# Patient Record
Sex: FEMALE | Race: WHITE | NOT HISPANIC OR LATINO | URBAN - METROPOLITAN AREA
[De-identification: names, ages, dates, MRNs, and addresses within clinical notes are randomized per-mention and may not be internally consistent; named-entity substitution may affect disease eponyms.]

---

## 2018-02-21 ENCOUNTER — INPATIENT (INPATIENT)
Facility: HOSPITAL | Age: 83
LOS: 4 days | Discharge: NURSING, REHAB OR LONG-TERM CARE | End: 2018-02-26
Attending: FAMILY MEDICINE | Admitting: FAMILY MEDICINE
Payer: MEDICARE

## 2018-02-21 VITALS — WEIGHT: 115.08 LBS

## 2018-02-21 DIAGNOSIS — F32.5 MAJOR DEPRESSIVE DISORDER, SINGLE EPISODE, IN FULL REMISSION: ICD-10-CM

## 2018-02-21 DIAGNOSIS — E86.0 DEHYDRATION: ICD-10-CM

## 2018-02-21 DIAGNOSIS — J96.91 RESPIRATORY FAILURE, UNSPECIFIED WITH HYPOXIA: ICD-10-CM

## 2018-02-21 DIAGNOSIS — E03.9 HYPOTHYROIDISM, UNSPECIFIED: ICD-10-CM

## 2018-02-21 DIAGNOSIS — G93.40 ENCEPHALOPATHY, UNSPECIFIED: ICD-10-CM

## 2018-02-21 DIAGNOSIS — R00.9 UNSPECIFIED ABNORMALITIES OF HEART BEAT: ICD-10-CM

## 2018-02-21 DIAGNOSIS — M25.551 PAIN IN RIGHT HIP: ICD-10-CM

## 2018-02-21 DIAGNOSIS — R09.02 HYPOXEMIA: ICD-10-CM

## 2018-02-21 DIAGNOSIS — N30.01 ACUTE CYSTITIS WITH HEMATURIA: ICD-10-CM

## 2018-02-21 DIAGNOSIS — Z29.9 ENCOUNTER FOR PROPHYLACTIC MEASURES, UNSPECIFIED: ICD-10-CM
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
## 2018-02-21 LAB
ALBUMIN SERPL ELPH-MCNC: 3.5 G/DL — SIGNIFICANT CHANGE UP (ref 3.3–5)
ALP SERPL-CCNC: 106 U/L — SIGNIFICANT CHANGE UP (ref 40–120)
ALT FLD-CCNC: 22 U/L — SIGNIFICANT CHANGE UP (ref 12–78)
ANION GAP SERPL CALC-SCNC: 10 MMOL/L — SIGNIFICANT CHANGE UP (ref 5–17)
APPEARANCE UR: (no result)
AST SERPL-CCNC: 28 U/L — SIGNIFICANT CHANGE UP (ref 15–37)
BACTERIA # UR AUTO: (no result)
BASOPHILS # BLD AUTO: 0 K/UL — SIGNIFICANT CHANGE UP (ref 0–0.2)
BASOPHILS NFR BLD AUTO: 0.2 % — SIGNIFICANT CHANGE UP (ref 0–2)
BILIRUB SERPL-MCNC: 0.8 MG/DL — SIGNIFICANT CHANGE UP (ref 0.2–1.2)
BILIRUB UR-MCNC: NEGATIVE — SIGNIFICANT CHANGE UP
BUN SERPL-MCNC: 28 MG/DL — HIGH (ref 7–23)
CALCIUM SERPL-MCNC: 9 MG/DL — SIGNIFICANT CHANGE UP (ref 8.5–10.1)
CHLORIDE SERPL-SCNC: 105 MMOL/L — SIGNIFICANT CHANGE UP (ref 96–108)
CO2 SERPL-SCNC: 23 MMOL/L — SIGNIFICANT CHANGE UP (ref 22–31)
COLOR SPEC: YELLOW — SIGNIFICANT CHANGE UP
COMMENT - URINE: SIGNIFICANT CHANGE UP
COMMENT - URINE: SIGNIFICANT CHANGE UP
CREAT SERPL-MCNC: 0.83 MG/DL — SIGNIFICANT CHANGE UP (ref 0.5–1.3)
DIFF PNL FLD: (no result)
EOSINOPHIL # BLD AUTO: 0 K/UL — SIGNIFICANT CHANGE UP (ref 0–0.5)
EOSINOPHIL NFR BLD AUTO: 0.2 % — SIGNIFICANT CHANGE UP (ref 0–6)
EPI CELLS # UR: SIGNIFICANT CHANGE UP
GLUCOSE SERPL-MCNC: 161 MG/DL — HIGH (ref 70–99)
GLUCOSE UR QL: NEGATIVE MG/DL — SIGNIFICANT CHANGE UP
HCT VFR BLD CALC: 43.6 % — SIGNIFICANT CHANGE UP (ref 34.5–45)
HGB BLD-MCNC: 14.5 G/DL — SIGNIFICANT CHANGE UP (ref 11.5–15.5)
KETONES UR-MCNC: (no result)
LEUKOCYTE ESTERASE UR-ACNC: (no result)
LYMPHOCYTES # BLD AUTO: 0.5 K/UL — LOW (ref 1–3.3)
LYMPHOCYTES # BLD AUTO: 4.2 % — LOW (ref 13–44)
MAGNESIUM SERPL-MCNC: 1.8 MG/DL — SIGNIFICANT CHANGE UP (ref 1.6–2.6)
MCHC RBC-ENTMCNC: 27.7 PG — SIGNIFICANT CHANGE UP (ref 27–34)
MCHC RBC-ENTMCNC: 33.2 GM/DL — SIGNIFICANT CHANGE UP (ref 32–36)
MCV RBC AUTO: 83.3 FL — SIGNIFICANT CHANGE UP (ref 80–100)
MONOCYTES # BLD AUTO: 0.6 K/UL — SIGNIFICANT CHANGE UP (ref 0–0.9)
MONOCYTES NFR BLD AUTO: 4.9 % — SIGNIFICANT CHANGE UP (ref 2–14)
NEUTROPHILS # BLD AUTO: 11.3 K/UL — HIGH (ref 1.8–7.4)
NEUTROPHILS NFR BLD AUTO: 90.6 % — HIGH (ref 43–77)
NITRITE UR-MCNC: NEGATIVE — SIGNIFICANT CHANGE UP
PH UR: 5 — SIGNIFICANT CHANGE UP (ref 5–8)
PLATELET # BLD AUTO: 125 K/UL — LOW (ref 150–400)
POTASSIUM SERPL-MCNC: 4.2 MMOL/L — SIGNIFICANT CHANGE UP (ref 3.5–5.3)
POTASSIUM SERPL-SCNC: 4.2 MMOL/L — SIGNIFICANT CHANGE UP (ref 3.5–5.3)
PROT SERPL-MCNC: 7.9 GM/DL — SIGNIFICANT CHANGE UP (ref 6–8.3)
PROT UR-MCNC: 500 MG/DL
RBC # BLD: 5.23 M/UL — HIGH (ref 3.8–5.2)
RBC # FLD: 12.9 % — SIGNIFICANT CHANGE UP (ref 10.3–14.5)
RBC CASTS # UR COMP ASSIST: >50 /HPF (ref 0–4)
SODIUM SERPL-SCNC: 138 MMOL/L — SIGNIFICANT CHANGE UP (ref 135–145)
SP GR SPEC: 1.02 — SIGNIFICANT CHANGE UP (ref 1.01–1.02)
TROPONIN I SERPL-MCNC: <0.015 NG/ML — SIGNIFICANT CHANGE UP (ref 0.01–0.04)
UROBILINOGEN FLD QL: NEGATIVE MG/DL — SIGNIFICANT CHANGE UP
WBC # BLD: 12.5 K/UL — HIGH (ref 3.8–10.5)
WBC # FLD AUTO: 12.5 K/UL — HIGH (ref 3.8–10.5)
WBC UR QL: (no result)

## 2018-02-21 PROCEDURE — 93010 ELECTROCARDIOGRAM REPORT: CPT

## 2018-02-21 PROCEDURE — 70450 CT HEAD/BRAIN W/O DYE: CPT | Mod: 26,59

## 2018-02-21 PROCEDURE — 99285 EMERGENCY DEPT VISIT HI MDM: CPT

## 2018-02-21 PROCEDURE — 70496 CT ANGIOGRAPHY HEAD: CPT | Mod: 26

## 2018-02-21 PROCEDURE — 73502 X-RAY EXAM HIP UNI 2-3 VIEWS: CPT | Mod: 26

## 2018-02-21 PROCEDURE — 71045 X-RAY EXAM CHEST 1 VIEW: CPT | Mod: 26

## 2018-02-21 RX ORDER — SODIUM CHLORIDE 9 MG/ML
500 INJECTION INTRAMUSCULAR; INTRAVENOUS; SUBCUTANEOUS ONCE
Qty: 0 | Refills: 0 | Status: COMPLETED | OUTPATIENT
Start: 2018-02-21 | End: 2018-02-21

## 2018-02-21 RX ORDER — IPRATROPIUM/ALBUTEROL SULFATE 18-103MCG
3 AEROSOL WITH ADAPTER (GRAM) INHALATION EVERY 6 HOURS
Qty: 0 | Refills: 0 | Status: DISCONTINUED | OUTPATIENT
Start: 2018-02-21 | End: 2018-02-26

## 2018-02-21 RX ORDER — ACETAMINOPHEN 500 MG
650 TABLET ORAL EVERY 6 HOURS
Qty: 0 | Refills: 0 | Status: DISCONTINUED | OUTPATIENT
Start: 2018-02-21 | End: 2018-02-26

## 2018-02-21 RX ORDER — OLANZAPINE 15 MG/1
5 TABLET, FILM COATED ORAL AT BEDTIME
Qty: 0 | Refills: 0 | Status: DISCONTINUED | OUTPATIENT
Start: 2018-02-21 | End: 2018-02-26

## 2018-02-21 RX ORDER — SODIUM CHLORIDE 9 MG/ML
1000 INJECTION INTRAMUSCULAR; INTRAVENOUS; SUBCUTANEOUS
Qty: 0 | Refills: 0 | Status: DISCONTINUED | OUTPATIENT
Start: 2018-02-21 | End: 2018-02-22

## 2018-02-21 RX ORDER — HEPARIN SODIUM 5000 [USP'U]/ML
5000 INJECTION INTRAVENOUS; SUBCUTANEOUS EVERY 12 HOURS
Qty: 0 | Refills: 0 | Status: DISCONTINUED | OUTPATIENT
Start: 2018-02-21 | End: 2018-02-26

## 2018-02-21 RX ORDER — CEFTRIAXONE 500 MG/1
1000 INJECTION, POWDER, FOR SOLUTION INTRAMUSCULAR; INTRAVENOUS EVERY 24 HOURS
Qty: 0 | Refills: 0 | Status: DISCONTINUED | OUTPATIENT
Start: 2018-02-21 | End: 2018-02-24

## 2018-02-21 RX ORDER — INFLUENZA VIRUS VACCINE 15; 15; 15; 15 UG/.5ML; UG/.5ML; UG/.5ML; UG/.5ML
0.5 SUSPENSION INTRAMUSCULAR ONCE
Qty: 0 | Refills: 0 | Status: COMPLETED | OUTPATIENT
Start: 2018-02-21 | End: 2018-02-26

## 2018-02-21 RX ORDER — CEFTRIAXONE 500 MG/1
1 INJECTION, POWDER, FOR SOLUTION INTRAMUSCULAR; INTRAVENOUS EVERY 24 HOURS
Qty: 0 | Refills: 0 | Status: DISCONTINUED | OUTPATIENT
Start: 2018-02-21 | End: 2018-02-21

## 2018-02-21 RX ORDER — ONDANSETRON 8 MG/1
4 TABLET, FILM COATED ORAL EVERY 6 HOURS
Qty: 0 | Refills: 0 | Status: DISCONTINUED | OUTPATIENT
Start: 2018-02-21 | End: 2018-02-26

## 2018-02-21 RX ORDER — LEVOTHYROXINE SODIUM 125 MCG
100 TABLET ORAL DAILY
Qty: 0 | Refills: 0 | Status: DISCONTINUED | OUTPATIENT
Start: 2018-02-21 | End: 2018-02-26

## 2018-02-21 RX ORDER — DOCUSATE SODIUM 100 MG
100 CAPSULE ORAL THREE TIMES A DAY
Qty: 0 | Refills: 0 | Status: DISCONTINUED | OUTPATIENT
Start: 2018-02-21 | End: 2018-02-26

## 2018-02-21 RX ORDER — TRAMADOL HYDROCHLORIDE 50 MG/1
25 TABLET ORAL EVERY 6 HOURS
Qty: 0 | Refills: 0 | Status: DISCONTINUED | OUTPATIENT
Start: 2018-02-21 | End: 2018-02-26

## 2018-02-21 RX ORDER — TRAMADOL HYDROCHLORIDE 50 MG/1
50 TABLET ORAL EVERY 6 HOURS
Qty: 0 | Refills: 0 | Status: DISCONTINUED | OUTPATIENT
Start: 2018-02-21 | End: 2018-02-26

## 2018-02-21 RX ORDER — MIRTAZAPINE 45 MG/1
30 TABLET, ORALLY DISINTEGRATING ORAL AT BEDTIME
Qty: 0 | Refills: 0 | Status: DISCONTINUED | OUTPATIENT
Start: 2018-02-21 | End: 2018-02-26

## 2018-02-21 RX ADMIN — MIRTAZAPINE 30 MILLIGRAM(S): 45 TABLET, ORALLY DISINTEGRATING ORAL at 21:40

## 2018-02-21 RX ADMIN — SODIUM CHLORIDE 80 MILLILITER(S): 9 INJECTION INTRAMUSCULAR; INTRAVENOUS; SUBCUTANEOUS at 21:37

## 2018-02-21 RX ADMIN — OLANZAPINE 5 MILLIGRAM(S): 15 TABLET, FILM COATED ORAL at 23:36

## 2018-02-21 RX ADMIN — HEPARIN SODIUM 5000 UNIT(S): 5000 INJECTION INTRAVENOUS; SUBCUTANEOUS at 23:33

## 2018-02-21 RX ADMIN — SODIUM CHLORIDE 500 MILLILITER(S): 9 INJECTION INTRAMUSCULAR; INTRAVENOUS; SUBCUTANEOUS at 15:21

## 2018-02-21 RX ADMIN — CEFTRIAXONE 1000 MILLIGRAM(S): 500 INJECTION, POWDER, FOR SOLUTION INTRAMUSCULAR; INTRAVENOUS at 23:36

## 2018-02-21 RX ADMIN — Medication 3 MILLILITER(S): at 19:51

## 2018-02-21 RX ADMIN — Medication 40 MILLIGRAM(S): at 23:34

## 2018-02-21 NOTE — ED PROVIDER NOTE - MUSCULOSKELETAL, MLM
Spine appears normal, range of motion is not limited, no muscle or joint tenderness. Mild right lower extremity weakness.

## 2018-02-21 NOTE — H&P ADULT - PROBLEM SELECTOR PLAN 3
had neuro eval- likely 2/2 metabolic/toxic etiology from UTI, dehydration  will check MRI/MRA   neuro f/u   treat uti

## 2018-02-21 NOTE — H&P ADULT - PMH
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

## 2018-02-21 NOTE — ED ADULT TRIAGE NOTE - CHIEF COMPLAINT QUOTE
fell yesterday ( hit her bottom denies head trauma)  around 1400 and began slurring and loss of appetite. Evaluated for stroke by Dr. REGAN

## 2018-02-21 NOTE — H&P ADULT - NSHPLABSRESULTS_GEN_ALL_CORE
Labs personally reviewed.                          14.5   12.5  )-----------( 125      ( 2018 14:02 )             43.6           138  |  105  |  28<H>  ----------------------------<  161<H>  4.2   |  23  |  0.83    Ca    9.0      2018 14:02  Mg     1.8         TPro  7.9  /  Alb  3.5  /  TBili  0.8  /  DBili  x   /  AST  28  /  ALT  22  /  AlkPhos  106                Urinalysis Basic - ( 2018 15:05 )    Color: Yellow / Appearance: very cloudy / S.020 / pH: x  Gluc: x / Ketone: Moderate  / Bili: Negative / Urobili: Negative mg/dL   Blood: x / Protein: 500 mg/dL / Nitrite: Negative   Leuk Esterase: Small / RBC: >50 /HPF / WBC 6-10   Sq Epi: x / Non Sq Epi: Few / Bacteria: Moderate            Lactate Trend      CARDIAC MARKERS ( 2018 14:02 )  <0.015 ng/mL / x     / x     / x     / x            CAPILLARY BLOOD GLUCOSE      POCT Blood Glucose.: 145 mg/dL (2018 13:51)            Imaging personally reviewed.    CT head    IMPRESSION:       No acute intracranial findings.    CTA BRain  IMPRESSION:          1.   Right carotid system:  No hemodynamically significant stenosis.          2.   Left carotid system:  No hemodynamically significant stenosis.           3.   Intracranial circulation:  No significant vascular lesion.            4.   Brain:  No significant lesion identified.                EKG  N/A for review, on telemonitor, appears sinus tachycardia

## 2018-02-21 NOTE — H&P ADULT - FAMILY HISTORY
Sibling  Still living? No  Family history of stomach cancer, Age at diagnosis: Age Unknown  Family history of lung cancer, Age at diagnosis: Age Unknown

## 2018-02-21 NOTE — H&P ADULT - PROBLEM SELECTOR PLAN 6
2/2 dehydration, pain, hypoxia, infection, cardiac etiology  monitor vitals  treat underlying causes

## 2018-02-21 NOTE — H&P ADULT - RS GEN PE MLT RESP DETAILS PC
no chest wall tenderness/respirations labored/diminished breath sounds, L/wheezes/diminished breath sounds, R

## 2018-02-21 NOTE — CONSULT NOTE ADULT - ASSESSMENT
Pt is an 85 year old woman with PMH of Dementia and hypothyroidism who was brought to the hospital b/c her family stated she had slurred speech and altered mental status, the patient c/o right hip and back pain.    #s/p fall  low suspicion of stroke  r/o metabolic encephalopathy due to possible UTI vs mechanical fall due to unsteady gait  CT negative but MRI would be definitive to r/o stroke

## 2018-02-21 NOTE — H&P ADULT - ASSESSMENT
85F w/ PMH hypothyroidism, psychotic depression, mild dementia, BIB dtr for c/o b/l hip pain after fall yesterday, dtr reports slurred speech, stroke code called in ED, CT head, CTA brain neg, neuro evaluated.    On Exam: lungs- diffuse wheezing, poor air entry, sob w/ conversation- requested pulse ox, found 87% on RA- now on 2L NC 96%.   Elevated BP- requested repeat - 155/80  UA +, WBC 12.5, .  hip/pelvic xray- no fracture/dislocation

## 2018-02-21 NOTE — ED PROVIDER NOTE - OBJECTIVE STATEMENT
84 y/o F w/ pmhx of baseline dementia, congenital right leg shortening, presents to ED c/o AMS and fall yesterday. Pt reports she was getting up unasisted yesterday, fell backwards against a wall, hit occiput of head and c/o hip pain. States she feels uncomfortable, unable to clearly state why. Pt normally ambulates with walker. Lives with family. As per family pt has been behaving differently, asking questions she normally doesn't ask, and hasn't eaten since yesterday morning. Also c/o nausea. Pt alert & oriented to person and place, not time.

## 2018-02-21 NOTE — H&P ADULT - HISTORY OF PRESENT ILLNESS
85F w/ PMH hypothyroidism, psychotic depression, mild dementia, BIB dtr for c/o b/l hip pain after fall yesterday.  Pt is a good historian and dtr w/ whom pt lives assists w/ hx.  Pt remembers falling while in her bedroom, she thinks she slipped and fell backward incurring hip pain.  Dtr heard thud, came in and found pt on floor, awake and c/o pain.  She did not want to go to hospital initially, but did c/o ongoing hip pain so she was brought in for evaluation.  Pt's hip pain is constant, 9/10, worse w/ movement, nonradiating.  Dtr also states she's noticed pt w/ some slurred speech and lately pt has been more unsteady and having to urinate frequently.  Pt endorses polyuria, dysuria, urgency.  Denies any fever/chills, n/v, abd pain.  Denies frequent falls, normally uses walker to ambulate.   Pt is notably sob w/ conversation, dtr states pt has to stop to rest every 5-10 steps.  Dtr noticed pt is always this way although has not been previously worked up as pt does not have PMD.  Pt does not c/o sob, chest pain, cough.    She is former smoker, quit 10 years ago, smoked 1.5ppd x 60yrs (90pyh)    In ED, stroke code called, pt evaluated by neuro and determined symptoms not likely d/t stroke.  CT head and CTA are negative, MRI/MRA recommended.  UA +, WBC 12.5, .      Vital Signs Last 24 Hrs  T(C): 36.6 (21 Feb 2018 14:49), Max: 36.6 (21 Feb 2018 14:49)  T(F): 97.9 (21 Feb 2018 14:49), Max: 97.9 (21 Feb 2018 14:49)  HR: 107 (21 Feb 2018 15:56) (106 - 107)  BP: 186/86 (21 Feb 2018 15:56) (186/86 - 190/90)  BP(mean): --  RR: 19 (21 Feb 2018 14:49) (19 - 19)  SpO2: 97% (21 Feb 2018 14:49) (97% - 97%) 85F w/ PMH hypothyroidism, psychotic depression, mild dementia, BIB dtr for c/o b/l hip pain after fall yesterday.  Pt is a good historian and dtr w/ whom pt lives assists w/ hx.  Pt remembers falling while in her bedroom, she thinks she slipped and fell backward incurring hip pain.  Dtr heard thud, came in and found pt on floor, awake and c/o pain.  She did not want to go to hospital initially, but did c/o ongoing hip pain so she was brought in for evaluation.  Pt's hip pain is constant, 9/10, worse w/ movement, nonradiating.  Dtr also states she's noticed pt w/ some slurred speech and lately pt has been more unsteady and having to urinate frequently.  Pt endorses polyuria, dysuria, urgency, and 1 episode of hematuria.  Denies any fever/chills, n/v, abd pain.  Denies frequent falls, normally uses walker to ambulate.   Pt is notably sob w/ conversation, dtr states pt has to stop to rest every 5-10 steps.  Dtr noticed pt is always this way although has not been previously worked up as pt does not have PMD.  Pt does not c/o sob, chest pain, cough.    She is former smoker, quit 10 years ago, smoked 1.5ppd x 60yrs (90pyh)    In ED, stroke code called, pt evaluated by neuro and determined symptoms not likely d/t stroke.  CT head and CTA are negative, MRI/MRA recommended.  UA +, WBC 12.5, .     Vital Signs Last 24 Hrs  T(C): 36.6 (21 Feb 2018 14:49), Max: 36.6 (21 Feb 2018 14:49)  T(F): 97.9 (21 Feb 2018 14:49), Max: 97.9 (21 Feb 2018 14:49)  HR: 107 (21 Feb 2018 15:56) (106 - 107)  BP: 186/86 (21 Feb 2018 15:56) (186/86 - 190/90)  BP(mean): --  RR: 19 (21 Feb 2018 14:49) (19 - 19)  SpO2: 97% (21 Feb 2018 14:49) (97% - 97%)

## 2018-02-21 NOTE — H&P ADULT - PROBLEM SELECTOR PLAN 2
no fracture/dislocation on xray  2/2 fall  will place on po analgesics 2/2 fall  no apparent fracture/dislocation on xray  will check CT LE as pt continues to have considerable pain w/ movement   will place on po analgesics

## 2018-02-21 NOTE — ED ADULT NURSE REASSESSMENT NOTE - COMFORT CARE
side rails up/hourly rounding completed
assisted to bedpan/repositioned/side rails up/assisted to bathroom/hourly rounding completed

## 2018-02-21 NOTE — ED ADULT NURSE NOTE - OBJECTIVE STATEMENT
Family states pt fell yesterday and c/o right hip pain.  States pt slurring words with AMS.  Code stroke called.  VSS, family denies hitting head, LOC. Hx dementia

## 2018-02-21 NOTE — ED ADULT NURSE REASSESSMENT NOTE - GENERAL PATIENT STATE
cooperative/comfortable appearance/family/SO at bedside/resting/sleeping
resting/sleeping/comfortable appearance/cooperative

## 2018-02-21 NOTE — ED ADULT NURSE NOTE - CHPI ED SYMPTOMS NEG
no change in level of consciousness/no vomiting/no dizziness/no loss of consciousness/no blurred vision/no nausea/no weakness/no fever/no numbness

## 2018-02-21 NOTE — H&P ADULT - PROBLEM SELECTOR PLAN 1
likely 2/2 UTI, weakness, dehydration  request PT eval/treat  fall precautions likely 2/2 UTI, weakness, dehydration  request PT eval/treat once CT LE is done   fall precautions

## 2018-02-21 NOTE — CONSULT NOTE ADULT - SUBJECTIVE AND OBJECTIVE BOX
Patient is a 85y old  Female who presents with a chief complaint of right hip and back pain     HPI: Pt is an 85 year old woman with PMH of Dementia and hypothyroidism who was brought to the hospital b/c her family stated she had slurred speech. As per the patient's daughter the patient fell backwards against a wall yesterday. The fall was unwitnessed, the patient often tries to ambulate without her walker. After the fall the family stated she seemed more confused and had some slurring of her words. Family also states she has had a decrease in appetite. Family denies any recent illness but states the patient has had an increase in urinary frequency and did c/o burning with urination as well as an episode of hematuria yesterday.    On exam pt is awake and alert, she is oriented to person and place, she recongnized her family, she has a mild dysarthria but her oral cavity appears very dry, she has no difficulty with repetition or naming. Pt has no drift of b/l upper extremities, LLE is 5/5 and RLE has a congenital weakness and is shorter than the left. CT of the head is negative for acute stroke or hemorrhage.       PAST MEDICAL & SURGICAL HISTORY:  Hypothyroidism   Psychiatric history  Dementia     FAMILY HISTORY: non-contributory       Social Hx:  lives at home with daughter and family, non-smoker, uses a walker to ambulate     MEDICATIONS:  Home meds reviewed, levothyroxine and psych meds, Advil for shoulder pain      Allergies:  No Known Allergies      ROS: Pertinent positives in HPI, all other ROS were reviewed and are negative.      Vital Signs Last 24 Hrs  T(C): --  T(F): --  HR: --  BP: --  BP(mean): --  RR: --  SpO2: --      PHYSICAL EXAM:  Constitutional: awake and alert, cooperative with exam, dementia at baseline   HEENT: PERRLA, EOMI   Neck: Supple   Respiratory: Breath sounds are clear bilaterally  Cardiovascular: S1 and S2, regular rhythm  Gastrointestinal: soft, nontender  Extremities:  no edema  Musculoskeletal: decreased ROM and strength to RLE -- from birth   Skin: No rashes    Neurological exam:  HF: A x O x 1-2. Appropriately interactive, normal affect. mild dysarthria, No Aphasia or paraphasic errors. Naming /repetition intact   CN: JOE, EOMI, VFF, facial sensation normal, no NLFD, tongue midline, Palate moves equally, SCM equal bilaterally  Motor: No pronator drift, Strength 5/5 in all 4 ext, normal bulk and tone, no tremor, rigidity or bradykinesia.    Sens: Intact to light touch / PP/ VS/ JS    Reflexes: Symmetric and normal . BJ 2+, BR 2+, KJ 2+, AJ 2+, downgoing toes b/l  Coord:  intact b/l upper ext   Gait/Balance: unsteady at baseline, deformity to right leg since birth     Labs: Pending     RADIOLOGY:  CT Brain Stroke Protocol (02.21.18 @ 14:12)  There is no evidence of intraparenchymal or extraaxial hemorrhage.     There is no CT evidence of large vessel acute infarct. No mass effect is   found in the brain.  No evidenceof midline shift or herniation pattern.

## 2018-02-22 DIAGNOSIS — I63.9 CEREBRAL INFARCTION, UNSPECIFIED: ICD-10-CM

## 2018-02-22 DIAGNOSIS — R29.6 REPEATED FALLS: ICD-10-CM

## 2018-02-22 LAB
ANION GAP SERPL CALC-SCNC: 9 MMOL/L — SIGNIFICANT CHANGE UP (ref 5–17)
ANISOCYTOSIS BLD QL: SLIGHT — SIGNIFICANT CHANGE UP
BASOPHILS # BLD AUTO: 0 K/UL — SIGNIFICANT CHANGE UP (ref 0–0.2)
BASOPHILS NFR BLD AUTO: 0.2 % — SIGNIFICANT CHANGE UP (ref 0–2)
BUN SERPL-MCNC: 23 MG/DL — SIGNIFICANT CHANGE UP (ref 7–23)
CALCIUM SERPL-MCNC: 8.2 MG/DL — LOW (ref 8.5–10.1)
CHLORIDE SERPL-SCNC: 107 MMOL/L — SIGNIFICANT CHANGE UP (ref 96–108)
CO2 SERPL-SCNC: 25 MMOL/L — SIGNIFICANT CHANGE UP (ref 22–31)
CREAT SERPL-MCNC: 0.61 MG/DL — SIGNIFICANT CHANGE UP (ref 0.5–1.3)
EOSINOPHIL # BLD AUTO: 0 K/UL — SIGNIFICANT CHANGE UP (ref 0–0.5)
EOSINOPHIL NFR BLD AUTO: 0.1 % — SIGNIFICANT CHANGE UP (ref 0–6)
GLUCOSE SERPL-MCNC: 137 MG/DL — HIGH (ref 70–99)
HCT VFR BLD CALC: 37.1 % — SIGNIFICANT CHANGE UP (ref 34.5–45)
HGB BLD-MCNC: 12.2 G/DL — SIGNIFICANT CHANGE UP (ref 11.5–15.5)
LYMPHOCYTES # BLD AUTO: 0.3 K/UL — LOW (ref 1–3.3)
LYMPHOCYTES # BLD AUTO: 4.1 % — LOW (ref 13–44)
MANUAL DIF COMMENT BLD-IMP: SIGNIFICANT CHANGE UP
MCHC RBC-ENTMCNC: 27.4 PG — SIGNIFICANT CHANGE UP (ref 27–34)
MCHC RBC-ENTMCNC: 32.8 GM/DL — SIGNIFICANT CHANGE UP (ref 32–36)
MCV RBC AUTO: 83.6 FL — SIGNIFICANT CHANGE UP (ref 80–100)
MICROCYTES BLD QL: SLIGHT — SIGNIFICANT CHANGE UP
MONOCYTES # BLD AUTO: 0.1 K/UL — SIGNIFICANT CHANGE UP (ref 0–0.9)
MONOCYTES NFR BLD AUTO: 1.5 % — LOW (ref 2–14)
NEUTROPHILS # BLD AUTO: 7.4 K/UL — SIGNIFICANT CHANGE UP (ref 1.8–7.4)
NEUTROPHILS NFR BLD AUTO: 94 % — HIGH (ref 43–77)
PLAT MORPH BLD: NORMAL — SIGNIFICANT CHANGE UP
PLATELET # BLD AUTO: 103 K/UL — LOW (ref 150–400)
POTASSIUM SERPL-MCNC: 3.7 MMOL/L — SIGNIFICANT CHANGE UP (ref 3.5–5.3)
POTASSIUM SERPL-SCNC: 3.7 MMOL/L — SIGNIFICANT CHANGE UP (ref 3.5–5.3)
RBC # BLD: 4.44 M/UL — SIGNIFICANT CHANGE UP (ref 3.8–5.2)
RBC # FLD: 13 % — SIGNIFICANT CHANGE UP (ref 10.3–14.5)
RBC BLD AUTO: (no result)
SODIUM SERPL-SCNC: 141 MMOL/L — SIGNIFICANT CHANGE UP (ref 135–145)
TSH SERPL-MCNC: 1.48 UIU/ML — SIGNIFICANT CHANGE UP (ref 0.36–3.74)
WBC # BLD: 7.9 K/UL — SIGNIFICANT CHANGE UP (ref 3.8–10.5)
WBC # FLD AUTO: 7.9 K/UL — SIGNIFICANT CHANGE UP (ref 3.8–10.5)

## 2018-02-22 PROCEDURE — 70551 MRI BRAIN STEM W/O DYE: CPT | Mod: 26

## 2018-02-22 PROCEDURE — 70544 MR ANGIOGRAPHY HEAD W/O DYE: CPT | Mod: 26,59

## 2018-02-22 PROCEDURE — 71250 CT THORAX DX C-: CPT | Mod: 26

## 2018-02-22 PROCEDURE — 99223 1ST HOSP IP/OBS HIGH 75: CPT

## 2018-02-22 PROCEDURE — 99222 1ST HOSP IP/OBS MODERATE 55: CPT

## 2018-02-22 PROCEDURE — 93306 TTE W/DOPPLER COMPLETE: CPT | Mod: 26

## 2018-02-22 PROCEDURE — 76377 3D RENDER W/INTRP POSTPROCES: CPT | Mod: 26

## 2018-02-22 PROCEDURE — 72192 CT PELVIS W/O DYE: CPT | Mod: 26

## 2018-02-22 RX ORDER — ATORVASTATIN CALCIUM 80 MG/1
40 TABLET, FILM COATED ORAL AT BEDTIME
Qty: 0 | Refills: 0 | Status: DISCONTINUED | OUTPATIENT
Start: 2018-02-22 | End: 2018-02-26

## 2018-02-22 RX ORDER — ASPIRIN/CALCIUM CARB/MAGNESIUM 324 MG
81 TABLET ORAL DAILY
Qty: 0 | Refills: 0 | Status: DISCONTINUED | OUTPATIENT
Start: 2018-02-22 | End: 2018-02-26

## 2018-02-22 RX ORDER — ASPIRIN/CALCIUM CARB/MAGNESIUM 324 MG
325 TABLET ORAL DAILY
Qty: 0 | Refills: 0 | Status: DISCONTINUED | OUTPATIENT
Start: 2018-02-22 | End: 2018-02-22

## 2018-02-22 RX ORDER — BUDESONIDE, MICRONIZED 100 %
0.25 POWDER (GRAM) MISCELLANEOUS EVERY 12 HOURS
Qty: 0 | Refills: 0 | Status: DISCONTINUED | OUTPATIENT
Start: 2018-02-22 | End: 2018-02-26

## 2018-02-22 RX ADMIN — Medication 100 MICROGRAM(S): at 06:07

## 2018-02-22 RX ADMIN — HEPARIN SODIUM 5000 UNIT(S): 5000 INJECTION INTRAVENOUS; SUBCUTANEOUS at 18:13

## 2018-02-22 RX ADMIN — MIRTAZAPINE 30 MILLIGRAM(S): 45 TABLET, ORALLY DISINTEGRATING ORAL at 21:23

## 2018-02-22 RX ADMIN — Medication 40 MILLIGRAM(S): at 06:06

## 2018-02-22 RX ADMIN — OLANZAPINE 5 MILLIGRAM(S): 15 TABLET, FILM COATED ORAL at 21:23

## 2018-02-22 RX ADMIN — CEFTRIAXONE 1000 MILLIGRAM(S): 500 INJECTION, POWDER, FOR SOLUTION INTRAMUSCULAR; INTRAVENOUS at 22:48

## 2018-02-22 RX ADMIN — ATORVASTATIN CALCIUM 40 MILLIGRAM(S): 80 TABLET, FILM COATED ORAL at 21:23

## 2018-02-22 RX ADMIN — Medication 81 MILLIGRAM(S): at 14:18

## 2018-02-22 RX ADMIN — Medication 40 MILLIGRAM(S): at 18:13

## 2018-02-22 NOTE — CONSULT NOTE ADULT - SUBJECTIVE AND OBJECTIVE BOX
HPI:  85F w/ PMH hypothyroidism, psychotic depression, mild dementia, BIB dtr for c/o b/l hip pain after fall yesterday.  Pt is a good historian and dtr w/ whom pt lives assists w/ hx.  Pt remembers falling while in her bedroom, she thinks she slipped and fell backward incurring hip pain.  Dtr heard thud, came in and found pt on floor, awake and c/o pain.  She did not want to go to hospital initially, but did c/o ongoing hip pain so she was brought in for evaluation.  Pt's hip pain is constant, 9/10, worse w/ movement, nonradiating.  Dtr also states she's noticed pt w/ some slurred speech and lately pt has been more unsteady and having to urinate frequently.  Pt endorses polyuria, dysuria, urgency, and 1 episode of hematuria.  Denies any fever/chills, n/v, abd pain.  Denies frequent falls, normally uses walker to ambulate.   Pt is notably sob w/ conversation, dtr states pt has to stop to rest every 5-10 steps, noted quite awhile.  Dtr noticed pt is always this way although has not been previously worked up as pt does not have PMD.  Pt does not c/o chest pain.  Does note occ wheeze with exertion.  Occas slight cough.   She is former smoker, quit 10 years ago, smoked 1.5ppd x 60yrs (90pyh).  CT scan shows severe emphysematous findings, trace R effusion, some peripheral mild scarring/subseg atelectasis.    In ED, stroke code called, pt evaluated by neuro and determined symptoms not likely d/t stroke.  CT head and CTA are negative, MRI/MRA recommended.  UA +, WBC 12.5, .     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 1000 milliGRAM(s) IV Push every 24 hours  docusate sodium 100 milliGRAM(s) Oral three times a day  heparin  Injectable 5000 Unit(s) SubCutaneous every 12 hours  influenza   Vaccine 0.5 milliLiter(s) IntraMuscular once  levothyroxine 100 MICROGram(s) Oral daily  methylPREDNISolone sodium succinate Injectable 40 milliGRAM(s) IV Push two times a day  mirtazapine 30 milliGRAM(s) Oral at bedtime  OLANZapine 5 milliGRAM(s) Oral at bedtime    MEDICATIONS  (PRN):  acetaminophen   Tablet. 650 milliGRAM(s) Oral every 6 hours PRN Mild Pain (1 - 3)  ondansetron Injectable 4 milliGRAM(s) IV Push every 6 hours PRN Nausea  traMADol 25 milliGRAM(s) Oral every 6 hours PRN Moderate Pain (4 - 6)  traMADol 50 milliGRAM(s) Oral every 6 hours PRN Severe Pain (7 - 10)      Allergies    No Known Allergies    Intolerances        SOCIAL HISTORY: Denies tobacco, etoh abuse or illicit drug use    FAMILY HISTORY:  Family history of lung cancer (Sibling)  Family history of stomach cancer (Sibling)      Vital Signs Last 24 Hrs  T(C): 36.4 (2018 11:19), Max: 36.9 (2018 19:28)  T(F): 97.5 (2018 11:19), Max: 98.5 (2018 19:28)  HR: 86 (2018 11:19) (86 - 98)  BP: 110/90 (2018 11:19) (110/90 - 154/99)  BP(mean): --  RR: 18 (2018 11:19) (16 - 19)  SpO2: 97% (2018 11:19) (92% - 97%)    REVIEW OF SYSTEMS:    CONSTITUTIONAL:  As per HPI.  HEENT:  Eyes:  No diplopia or blurred vision. ENT:  No earache, sore throat or runny nose.  CARDIOVASCULAR:  No pressure, squeezing, tightness, heaviness or aching about the chest, neck, axilla or epigastrium.  RESPIRATORY:  see above.  GASTROINTESTINAL:  No nausea, vomiting or diarrhea.  GENITOURINARY:  No dysuria, frequency or urgency.  MUSCULOSKELETAL:  As per HPI.  SKIN:  No change in skin, hair or nails.  NEUROLOGIC:  No paresthesias, fasciculations, seizures or weakness.  PSYCHIATRIC:  No disorder of thought or mood.  ENDOCRINE:  No heat or cold intolerance, polyuria or polydipsia.  HEMATOLOGICAL:  No easy bruising or bleedings:  .     PHYSICAL EXAMINATION:    GENERAL APPEARANCE:  Pt. is not currently dyspneic, in no distress. Pt. is alert, oriented, and pleasant.  HEENT:  Pupils are normal and react normally. No icterus. Mucous membranes well colored.  NECK:  Supple. No lymphadenopathy. Positive SC retractions.  HEART:   The cardiac impulse has a normal quality. Regular. Distant HS's, 90, reg.  CHEST:  Decreased aud BS's bilat. Pursed lip expirations intermitt.  ABDOMEN:  Soft and nontender.   EXTREMITIES:  There is no cyanosis, clubbing or edema.   SKIN:  No rash or significant lesions are noted.  Neuro: Alert, awake, and O x 3.      LABS:                        12.2   7.9   )-----------( 103      ( 2018 05:57 )             37.1     -    141  |  107  |  23  ----------------------------<  137<H>  3.7   |  25  |  0.61    Ca    8.2<L>      2018 05:57  Mg     1.8     -    TPro  7.9  /  Alb  3.5  /  TBili  0.8  /  DBili  x   /  AST  28  /  ALT  22  /  AlkPhos  106  -    LIVER FUNCTIONS - ( 2018 14:02 )  Alb: 3.5 g/dL / Pro: 7.9 gm/dL / ALK PHOS: 106 U/L / ALT: 22 U/L / AST: 28 U/L / GGT: x             CARDIAC MARKERS ( 2018 14:02 )  <0.015 ng/mL / x     / x     / x     / x          Urinalysis Basic - ( 2018 15:05 )    Color: Yellow / Appearance: very cloudy / S.020 / pH: x  Gluc: x / Ketone: Moderate  / Bili: Negative / Urobili: Negative mg/dL   Blood: x / Protein: 500 mg/dL / Nitrite: Negative   Leuk Esterase: Small / RBC: >50 /HPF / WBC 6-10   Sq Epi: x / Non Sq Epi: Few / Bacteria: Moderate          RADIOLOGY & ADDITIONAL STUDIES:

## 2018-02-22 NOTE — DIETITIAN INITIAL EVALUATION ADULT. - PROBLEM SELECTOR PLAN 2
2/2 fall  no apparent fracture/dislocation on xray  will check CT LE as pt continues to have considerable pain w/ movement   will place on po analgesics

## 2018-02-22 NOTE — DIETITIAN INITIAL EVALUATION ADULT. - NS AS NUTRI DX NUTRIENT
wt loss 17.4% x18mo, moderate muscle loss temple, shoulder, thigh, inteross; mild fat loss tricep/Malnutrition Malnutrition

## 2018-02-22 NOTE — PROGRESS NOTE ADULT - ASSESSMENT
85 year old woman with PMH of hypothyroidism, psychotic depression, mild dementia, BIB dtr for c/o b/l hip pain after fall yesterday, daughter reports slurred speech, stroke code called in ED, CT head, CTA brain neg, neuro evaluated.      * New onset of slurred speech- r/o Stroke vs TIA  * encephalopathy- as per daughter patient is confused at baseline  - CT unremarkable.  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  - neurochecks as per routine      *Fall secondary to unsteady gait/ chronic severe R hip arthrosis  - Xray negative for fractures  - CT Pelvis showed severe right acetabular dysplasia   - Ortho consult aprpeacited  - Continue pain regimen.  - Physical therapy consult    *UTI/Cystitis/ hematuria  - on ceftriaxone D# 1/3   - urine culture pending  - monitor H/H- stable thus far      * Wheezing/ Probable COPD - acute hypoxemic respiratory failure  - CT chest showed right basilar atelectasis and severe centrilobular emphysema  - c/w nebulizer treatment  - Pulmonary consult pending  - continue prednisone  - on supplemental O2    * hypothyroid  - will check TSH    * DVT prophylaxis  - heparin SQ      Case d/w team on IDR. 85 year old woman with PMH of hypothyroidism, psychotic depression, mild dementia, BIB dtr for c/o b/l hip pain after fall yesterday, daughter reports slurred speech, stroke code called in ED, CT head, CTA brain neg, neuro evaluated.      * New onset of slurred speech(now resolved per daughter)- r/o Stroke vs TIA  * encephalopathy- as per daughter patient is confused at baseline-probably  worsening dementia  - CT unremarkable.  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  - neurochecks as per routine      *Fall secondary to unsteady gait/ chronic severe R hip arthrosis  - Xray negative for fractures  - CT Pelvis showed severe right acetabular dysplasia   - Ortho consult aprpeacited  - Continue pain regimen.  - Physical therapy consult    *UTI/Cystitis/ hematuria  - on ceftriaxone D# 1/3   - urine culture pending  -will d/c ceftriaxone on day 3 if remains afebrile and urine cx negative   - monitor H/H- stable thus far      * Wheezing/ Probable COPD - acute hypoxemic respiratory failure  - CT chest showed right basilar atelectasis and severe centrilobular emphysema  - c/w nebulizer treatment  - Pulmonary consult pending  - continue prednisone  - on supplemental O2    * hypothyroid  - will check TSH    * DVT prophylaxis  - heparin SQ    would need acute rehab, PT consult pending  Case d/w team on IDR. 85 year old woman with PMH of hypothyroidism, psychotic depression, mild dementia, BIB dtr for c/o b/l hip pain after fall yesterday, daughter reports slurred speech, stroke code called in ED, CT head, CTA brain neg, neuro evaluated.      * New onset of slurred speech(now resolved per daughter)- r/o Stroke vs TIA  * encephalopathy- as per daughter patient is confused at baseline-probably  worsening dementia  - CT unremarkable.  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  - neurochecks as per routine      *Fall secondary to unsteady gait/ chronic severe R hip arthrosis  - Xray negative for fractures  - CT Pelvis showed severe right acetabular dysplasia   - Ortho consult aprpeacited  - Continue pain regimen.  - Physical therapy consult    *UTI/Cystitis/ hematuria  - on ceftriaxone D# 1/3   - urine culture pending  -will d/c ceftriaxone on day 3 if remains afebrile and urine cx negative   - monitor H/H- stable thus far      * Wheezing/ Probable COPD - acute hypoxemic respiratory failure  - CT chest showed right basilar atelectasis and severe centrilobular emphysema  - c/w nebulizer treatment  - Pulmonary consult pending  - continue prednisone  - on supplemental O2    * hypothyroid  - will check TSH    * DVT prophylaxis  - heparin SQ    would need acute rehab, PT consult pending  Case d/w team on IDR.

## 2018-02-22 NOTE — CONSULT NOTE ADULT - PROBLEM SELECTOR RECOMMENDATION 2
Brain MRI with global atrophy, single lacunar infarct - findings not suggestive of embolic phenomena.

## 2018-02-22 NOTE — CONSULT NOTE ADULT - SUBJECTIVE AND OBJECTIVE BOX
Orthopedics Consult Note:    This is an 85y Female with PMHx Hypothyroidism, Mild Demenita and Psychotic Depression who is admitted to Medical service with B/L hip pain s/p fall, UTI and SOB. Ortho consult placed to evaluate B/L hip pain s/p fall. Pt seen with daughter at bedside reporting fall 2 days ago with B/L hip and low back pain. Pt reports her right leg 'gave out' while she was putting something in her dresser drawer causing her to fall backwards into a wall and then sliding down onto her buttocks. Pt reports chronic intermittent hip pain and a history of 'hip dislocation' as a baby requiring surgery. Pt has a small scab over right lateral hip which has been present for about a year, nonhealing, nondraining. Pt denies any numbness, tinging or paresthesias. Denies any fevers or chills. Pt denies any other injuries or pain elsewhere on the body. Pt ambulates at baseline with walker.     Past Medical & Surgical History:  DELIRIUM, DEMENTIA  No h/o HF  Family history of lung cancer (Sibling)  Family history of stomach cancer (Sibling)  Handoff  MEWS Score  Osteoarthritis of both elbows, unspecified osteoarthritis type  Psychotic depression in full remission  Hypothyroidism, unspecified type  Dementia  Shortening, leg, congenital, right  Dementia  Shortening, leg, congenital, right  Delirium  Respiratory failure with hypoxia  Prophylactic measure  Psychotic depression in full remission  Hypothyroidism, unspecified type  Dehydration  Elevated heart rate with elevated blood pressure without diagnosis of hypertension  Hypoxia  Acute cystitis with hematuria  Encephalopathy acute  Pain of both hip joints  Fall at home, initial encounter  No significant past surgical history  FELL YESTERDAY ON HER BEHIND  Dementia    Allergies:  No Known Allergies    Vital Signs:  T(C): 36.4 (02-22-18 @ 11:19), Max: 36.9 (02-21-18 @ 19:28)  HR: 86 (02-22-18 @ 11:19) (86 - 107)  BP: 110/90 (02-22-18 @ 11:19) (110/90 - 190/90)  RR: 18 (02-22-18 @ 11:19) (16 - 19)  SpO2: 97% (02-22-18 @ 11:19) (92% - 97%)    Labs:                        12.2   7.9   )-----------( 103      ( 22 Feb 2018 05:57 )             37.1   02-22    141  |  107  |  23  ----------------------------<  137<H>  3.7   |  25  |  0.61    Ca    8.2<L>      22 Feb 2018 05:57  Mg     1.8     02-21    TPro  7.9  /  Alb  3.5  /  TBili  0.8  /  DBili  x   /  AST  28  /  ALT  22  /  AlkPhos  106  02-21    CT scan of the pelvis and X-rays of the pelvis and B/L hips demonstrate chronic right hip dysplasia, severe right hip arthrosis, moderate to severe left hip arthrosis; no evidence of acute fracture, dislocation or bony injury.    PE B/L hips:  RLE short. No swelling, small dressing over right lateral hip lifted revealing ~1cm intact scab with no drainage, skin otherwise intact, no erythema, no ecchymosis, normothermic. No TTP over trochs or groin B/L. Able to actively range with no significant pain. +mild limited passive flexion on right hip 2' stiffness with minimal pain at limits, good painless passive ROM on left hip. No pain with log roll, no pain with axial loading/heel strike. Able to SLR on LLE, unable on RLE 2' weakness. Ecchymosis noted over dorsum of right foot with minimal tenderness and ecchymosis noted over medial distal tibia with minimal tenderness. Good painless knee, ankle and foot motion. Moving all toes, sensation intact. DP and PT pulses.    Secondary Survey:  B/L UEs with no swelling, no ecchymoses, no abrasions, no lacerations or any other signs of injury with full painless baseline ROM and no bony TTP. Sensation intact distally, moving all digits. Distal pulses intact.     Spine and ribs with no bony TTP. +mild TTP over right side lumbar paravertebral muscles.    Assessment:  85y Female with B/L hip pain s/p fall 2' exacerbation of chronic OA.     Plan:  No acute orthopedic surgical intervention.  Analgesia.  PT for WBAT BLE.  DVT ppx.  Follow-up with Dr. Fountain as outpatient as needed after discharge; call office for appointment.    Case discussed with Dr. Fountain who agrees with plan.

## 2018-02-22 NOTE — DIETITIAN INITIAL EVALUATION ADULT. - OTHER INFO
Seen for unintentional weight loss. Pt is an 85-yo female admitted with hip pain post fall at home; admit dx: dementia/delirium. PMH hypothyroidism, psychotic depression, dementia, osteoarthritis. Pt appears frail, is a poor historian due to dementia, confusion. Pt lives w/ daughter Diane, at bedside. Per daughter, mother's appetite was good PTA, and she followed a regular diet at home, never misses a meal, and is able to eat independently. However, has stopped eating "hard," chewy foods due to difficulty with dentures. daughter reports pt has lost about 20 pounds over the past year and a half, but she didn't notice bc loss was so gradual.  Skin is ecchymotic, and pt has a skin tear on L elbow, as well as 3 PUs: Stage 1 R buttocks, stage 1 L ankle, and unstageable R hip. Michael is 16. NFPE reveals moderate muscle wasting at temples, shoulder, thigh, and interosseous, and mild fat wasting at triceps, and moderate buccal fat wasting. Pt meets criteria for non-severe protein calorie malnutrition in the context of chronic illness. RECOMMENDATIONS: 1) Add Ensure Enlive TID, 2) change diet order to mechanical soft texture to improve PO intake, 3) Add zinc sulfate 220mg BID x 10 days, and Vit C 500 mg BID to optimize wound healing 4) Add MVI, 5) Monitor weekly weight, 6) Monitor PO intake. Seen for unintentional weight loss. Pt is an 85-yo female admitted with hip pain post fall at home; admit dx: dementia/delirium. PMH hypothyroidism, psychotic depression, dementia, osteoarthritis. Pt appears frail, is a poor historian due to dementia, confusion. Pt lives w/ daughter Diane, at bedside. Per daughter, mother's appetite was good PTA, and she followed a regular diet at home, never misses a meal, and is able to eat independently. However, has stopped eating "hard," chewy foods due to difficulty with dentures. daughter reports pt has lost about 20 pounds over the past year and a half, but she didn't notice bc loss was so gradual.  Skin is ecchymotic, and pt has a skin tear on L elbow, as well as 3 PUs: Stage 1 R buttocks, stage 1 L ankle, and unstageable R hip. Michael is 16. NFPE reveals moderate muscle wasting at temples, shoulder, thigh, and interosseous, and mild fat wasting at triceps, and moderate buccal fat wasting. Pt meets criteria for non-severe protein calorie malnutrition in the context of chronic illness. RECOMMENDATIONS: 1) Add Ensure Enlive BID and ProSource BID 2) C/W mechanical soft texture, 3) Add zinc sulfate 220mg BID x 10 days, and Vit C 500 mg BID to optimize wound healing 4) Add MVI, 5) Monitor weekly weight, 6) Monitor PO intake.

## 2018-02-22 NOTE — CONSULT NOTE ADULT - SUBJECTIVE AND OBJECTIVE BOX
CHIEF COMPLAINT: Patient is a 85y old  Female who presents with a chief complaint of hip pain (21 Feb 2018 18:15)    HPI:  85 year old woman with a history of hypothyroidism, depression, dementia, admitted 2/21/18 following a fall -- patient says she mis-stepped and lost her balance causing her to fall to the ground.  She has no history of heart disease and denies: syncope, angina, palpitations, orthopnea.  Patient has a decreased functional status - ambulates slowly and with a walker.  Patient's family reports frequent falls, unsteady gait, occasional slurred speech.    PAST MEDICAL & SURGICAL HISTORY:  Osteoarthritis of both elbows, unspecified osteoarthritis type  Psychotic depression in full remission  Hypothyroidism, unspecified type  Dementia  Shortening, leg, congenital, right  No significant past surgical history    SOCIAL HISTORY:   Smoking: Former smoker    FAMILY HISTORY: FAMILY HISTORY:  Family history of lung cancer (Sibling)  Family history of stomach cancer (Sibling)    MEDICATIONS  (STANDING):  ALBUTerol/ipratropium for Nebulization 3 milliLiter(s) Nebulizer every 6 hours  aspirin  chewable 81 milliGRAM(s) Oral daily  atorvastatin 40 milliGRAM(s) Oral at bedtime  cefTRIAXone Injectable. 1000 milliGRAM(s) IV Push every 24 hours  docusate sodium 100 milliGRAM(s) Oral three times a day  heparin  Injectable 5000 Unit(s) SubCutaneous every 12 hours  influenza   Vaccine 0.5 milliLiter(s) IntraMuscular once  levothyroxine 100 MICROGram(s) Oral daily  methylPREDNISolone sodium succinate Injectable 40 milliGRAM(s) IV Push two times a day  mirtazapine 30 milliGRAM(s) Oral at bedtime  OLANZapine 5 milliGRAM(s) Oral at bedtime    MEDICATIONS  (PRN):  acetaminophen   Tablet. 650 milliGRAM(s) Oral every 6 hours PRN Mild Pain (1 - 3)  ondansetron Injectable 4 milliGRAM(s) IV Push every 6 hours PRN Nausea  traMADol 25 milliGRAM(s) Oral every 6 hours PRN Moderate Pain (4 - 6)  traMADol 50 milliGRAM(s) Oral every 6 hours PRN Severe Pain (7 - 10)    Allergies:  No Known Allergies    REVIEW OF SYSTEMS:  CONSTITUTIONAL: Mild weakness, no fevers or chills  EYES/ENT: No visual changes;  No throat pain   NECK: No pain or stiffness  RESPIRATORY: No cough, wheezing, hemoptysis; No shortness of breath  CARDIOVASCULAR: No chest pain or palpitations  GASTROINTESTINAL: No abdominal pain. No nausea, vomiting, or hematemesis; No diarrhea or constipation. No melena or hematochezia.  GENITOURINARY: No dysuria, frequency or hematuria  NEUROLOGICAL: No numbness.  SKIN: No itching or rash  All other review of systems is negative unless indicated above    VITAL SIGNS:   Vital Signs Last 24 Hrs  T(C): 36.4 (22 Feb 2018 11:19), Max: 36.9 (21 Feb 2018 19:28)  T(F): 97.5 (22 Feb 2018 11:19), Max: 98.5 (21 Feb 2018 19:28)  HR: 86 (22 Feb 2018 11:19) (86 - 107)  BP: 110/90 (22 Feb 2018 11:19) (110/90 - 190/90)  RR: 18 (22 Feb 2018 11:19) (16 - 19)  SpO2: 97% (22 Feb 2018 11:19) (92% - 97%)    PHYSICAL EXAM:  Constitutional: NAD, awake and alert, appears stated age  HEENT:   Pupils round, poor dentition  Pulmonary: Non-labored, breath sounds are clear bilaterally, No wheezing, rales or rhonchi  Cardiovascular: S1 and S2, regular rate and rhythm  Gastrointestinal: Bowel Sounds present, soft, nontender.   Lymph: No peripheral edema. No cervical lymphadenopathy.  Skin: No rashes.  Psych:  Mood & affect appropriate    LABS:                    12.2   7.9   )-----------( 103      ( 22 Feb 2018 05:57 )             37.1                     141    |  107    |  23     ----------------------------<  137    3.7     |  25     |  0.61     CARDIAC MARKERS ( 21 Feb 2018 14:02 ) <0.015 ng/mL / x     / x     / x     / x        TSH: 1.480    12 Lead ECG (02.21.18 @ 14:25): Sinus tachycardia.  Nonspecific ST abnormality    MR Head No Cont (02.22.18 @ 10:07): Moderate periventricular and deep white matter .  Old lacunar infarctions are seen in the  LEFT cerebellum.  Global atrophy.

## 2018-02-22 NOTE — DIETITIAN INITIAL EVALUATION ADULT. - PROBLEM SELECTOR PLAN 5
w/ diffuse wheezing, sob conversing  possibly 2/2 COPD in former smoker, cardiac etiology, kyphosis- no prior work up  will check CXR, TTE- further per findings  will treat w/ duonebs, IV steroids, oxygen  pulm cs  monitor pulse ox, titrate O2 to keep sats>90%

## 2018-02-22 NOTE — CONSULT NOTE ADULT - ASSESSMENT
Severe COPD/Emphysema.  nebs, O2, will add neb budesonide, IV solumedrol 40 mg bid,  assess for need for home O2 before before d/c.  will need outpatient PFT's,.   Kyphoscoliosis.  Likely component of restrictive lung disease.  Secondary mild pulmonary hypertension.    UTI. Leukocytosis.  IV Abx.  Per primary team.  Fall. As per primary team. Severe COPD/Emphysema.  nebs, O2, will add neb budesonide, IV solumedrol 40 mg bid,  assess for need for home O2 before before d/c.  will need outpatient PFT's. will follow as outpatient.  Kyphoscoliosis.  Likely component of restrictive lung disease.  Secondary mild pulmonary hypertension.    UTI. Leukocytosis.  IV Abx.  Per primary team.  Fall. As per primary team.

## 2018-02-23 LAB
-  AMIKACIN: SIGNIFICANT CHANGE UP
-  AMPICILLIN/SULBACTAM: SIGNIFICANT CHANGE UP
-  AMPICILLIN: SIGNIFICANT CHANGE UP
-  AZTREONAM: SIGNIFICANT CHANGE UP
-  CEFAZOLIN: SIGNIFICANT CHANGE UP
-  CEFEPIME: SIGNIFICANT CHANGE UP
-  CEFOXITIN: SIGNIFICANT CHANGE UP
-  CEFTAZIDIME: SIGNIFICANT CHANGE UP
-  CEFTRIAXONE: SIGNIFICANT CHANGE UP
-  CIPROFLOXACIN: SIGNIFICANT CHANGE UP
-  ERTAPENEM: SIGNIFICANT CHANGE UP
-  GENTAMICIN: SIGNIFICANT CHANGE UP
-  LEVOFLOXACIN: SIGNIFICANT CHANGE UP
-  MEROPENEM: SIGNIFICANT CHANGE UP
-  NITROFURANTOIN: SIGNIFICANT CHANGE UP
-  PIPERACILLIN/TAZOBACTAM: SIGNIFICANT CHANGE UP
-  TOBRAMYCIN: SIGNIFICANT CHANGE UP
-  TRIMETHOPRIM/SULFAMETHOXAZOLE: SIGNIFICANT CHANGE UP
ANION GAP SERPL CALC-SCNC: 6 MMOL/L — SIGNIFICANT CHANGE UP (ref 5–17)
BUN SERPL-MCNC: 35 MG/DL — HIGH (ref 7–23)
CALCIUM SERPL-MCNC: 8.5 MG/DL — SIGNIFICANT CHANGE UP (ref 8.5–10.1)
CHLORIDE SERPL-SCNC: 108 MMOL/L — SIGNIFICANT CHANGE UP (ref 96–108)
CHOLEST SERPL-MCNC: 213 MG/DL — HIGH (ref 10–199)
CO2 SERPL-SCNC: 30 MMOL/L — SIGNIFICANT CHANGE UP (ref 22–31)
CREAT SERPL-MCNC: 0.63 MG/DL — SIGNIFICANT CHANGE UP (ref 0.5–1.3)
CULTURE RESULTS: SIGNIFICANT CHANGE UP
GLUCOSE SERPL-MCNC: 116 MG/DL — HIGH (ref 70–99)
HBA1C BLD-MCNC: 5.4 % — SIGNIFICANT CHANGE UP (ref 4–5.6)
HCT VFR BLD CALC: 39.1 % — SIGNIFICANT CHANGE UP (ref 34.5–45)
HDLC SERPL-MCNC: 69 MG/DL — SIGNIFICANT CHANGE UP (ref 40–125)
HGB BLD-MCNC: 12.7 G/DL — SIGNIFICANT CHANGE UP (ref 11.5–15.5)
LIPID PNL WITH DIRECT LDL SERPL: 125 MG/DL — SIGNIFICANT CHANGE UP
MCHC RBC-ENTMCNC: 27.4 PG — SIGNIFICANT CHANGE UP (ref 27–34)
MCHC RBC-ENTMCNC: 32.6 GM/DL — SIGNIFICANT CHANGE UP (ref 32–36)
MCV RBC AUTO: 84.3 FL — SIGNIFICANT CHANGE UP (ref 80–100)
METHOD TYPE: SIGNIFICANT CHANGE UP
ORGANISM # SPEC MICROSCOPIC CNT: SIGNIFICANT CHANGE UP
ORGANISM # SPEC MICROSCOPIC CNT: SIGNIFICANT CHANGE UP
PLATELET # BLD AUTO: 121 K/UL — LOW (ref 150–400)
POTASSIUM SERPL-MCNC: 3.9 MMOL/L — SIGNIFICANT CHANGE UP (ref 3.5–5.3)
POTASSIUM SERPL-SCNC: 3.9 MMOL/L — SIGNIFICANT CHANGE UP (ref 3.5–5.3)
RBC # BLD: 4.64 M/UL — SIGNIFICANT CHANGE UP (ref 3.8–5.2)
RBC # FLD: 12.9 % — SIGNIFICANT CHANGE UP (ref 10.3–14.5)
SODIUM SERPL-SCNC: 144 MMOL/L — SIGNIFICANT CHANGE UP (ref 135–145)
SPECIMEN SOURCE: SIGNIFICANT CHANGE UP
TOTAL CHOLESTEROL/HDL RATIO MEASUREMENT: 3.1 RATIO — LOW (ref 3.3–7.1)
TRIGL SERPL-MCNC: 95 MG/DL — SIGNIFICANT CHANGE UP (ref 10–149)
WBC # BLD: 8.7 K/UL — SIGNIFICANT CHANGE UP (ref 3.8–10.5)
WBC # FLD AUTO: 8.7 K/UL — SIGNIFICANT CHANGE UP (ref 3.8–10.5)

## 2018-02-23 PROCEDURE — 99233 SBSQ HOSP IP/OBS HIGH 50: CPT

## 2018-02-23 RX ADMIN — Medication 3 MILLILITER(S): at 08:27

## 2018-02-23 RX ADMIN — MIRTAZAPINE 30 MILLIGRAM(S): 45 TABLET, ORALLY DISINTEGRATING ORAL at 22:33

## 2018-02-23 RX ADMIN — CEFTRIAXONE 1000 MILLIGRAM(S): 500 INJECTION, POWDER, FOR SOLUTION INTRAMUSCULAR; INTRAVENOUS at 22:33

## 2018-02-23 RX ADMIN — ATORVASTATIN CALCIUM 40 MILLIGRAM(S): 80 TABLET, FILM COATED ORAL at 22:33

## 2018-02-23 RX ADMIN — Medication 40 MILLIGRAM(S): at 12:00

## 2018-02-23 RX ADMIN — Medication 3 MILLILITER(S): at 20:30

## 2018-02-23 RX ADMIN — Medication 0.25 MILLIGRAM(S): at 20:31

## 2018-02-23 RX ADMIN — Medication 0.25 MILLIGRAM(S): at 08:27

## 2018-02-23 RX ADMIN — HEPARIN SODIUM 5000 UNIT(S): 5000 INJECTION INTRAVENOUS; SUBCUTANEOUS at 06:39

## 2018-02-23 RX ADMIN — OLANZAPINE 5 MILLIGRAM(S): 15 TABLET, FILM COATED ORAL at 22:33

## 2018-02-23 RX ADMIN — HEPARIN SODIUM 5000 UNIT(S): 5000 INJECTION INTRAVENOUS; SUBCUTANEOUS at 17:50

## 2018-02-23 RX ADMIN — Medication 100 MICROGRAM(S): at 06:39

## 2018-02-23 RX ADMIN — Medication 81 MILLIGRAM(S): at 12:00

## 2018-02-23 NOTE — PHYSICAL THERAPY INITIAL EVALUATION ADULT - IMPAIRMENTS CONTRIBUTING TO GAIT DEVIATIONS, PT EVAL
impaired balance/O2 Sat taken by Resp Tx [place] post ambulation seated on RA: 83%; RN Margaret present / aware

## 2018-02-23 NOTE — PROGRESS NOTE ADULT - ASSESSMENT
Severe COPD/Emphysema.  nebs, O2, will add neb budesonide, IV solumedrol 40 mg bid, change to prednisone 40 mg/d today.  assess for need for home O2 before before d/c.  will need outpatient PFT's. will follow as outpatient.  Aspiration precautions.   Kyphoscoliosis.  Likely component of restrictive lung disease.  Secondary mild pulmonary hypertension.    UTI. Leukocytosis.  IV Abx.  Per primary team.  Fall. As per primary team.

## 2018-02-23 NOTE — PROGRESS NOTE ADULT - ASSESSMENT
85 year old woman with PMH of hypothyroidism, psychotic depression, mild dementia, BIB dtr for c/o b/l hip pain after fall yesterday, daughter reports slurred speech, stroke code called in ED, CT head, CTA brain neg, neuro evaluated.      * New onset of slurred speech(now resolved per daughter) likely related to metabolic encephalopathy which has now resolved - ruled out stroke , ruled out acute cardiac etiology, TIA work up neg   * worsening dementia  - CT unremarkable.  - MRI showed Old Lacunar infarcts in the left cerebellum  - MRA negative   - cardiology workup negative  - ASA and statin started  - neurochecks as per routine      *Fall secondary to unsteady gait/ chronic severe R hip arthrosis  - Xray negative for fractures  - CT Pelvis showed severe right acetabular dysplasia   - Ortho consult aprpeacited  - Continue pain regimen.Physical therapy consult     *UTI /Cystitis/ hematuria  - on ceftriaxone day #3   - urine culture grew proteus, pending sensitivity  -will continue IV ceftraixone today as patient had mild fever 100.5 overnight, would switch to po abx if remains afebrile for 24 hrs and after sensitivity result   - if remains afebrile then would repeat CXR and check for other infectious source  - monitor H/H- stable thus far      * Wheezing/ Probable COPD - acute hypoxemic respiratory failure  - CT chest showed right basilar atelectasis and severe centrilobular emphysema  - c/w nebulizer treatment  - eveluate for home O2-  - switch to prednisone po and inhaled steroids and neb tx  -outpatient f/u w/ dr barrera and for PFt evaluation      * hypothyroid  - will check TSH    * DVT prophylaxis  - heparin SQ    would need acute rehab, PT consult pending  Case d/w team on IDR. 85 year old woman with PMH of hypothyroidism, psychotic depression, mild dementia, BIB dtr for c/o b/l hip pain after fall yesterday, daughter reports slurred speech, stroke code called in ED, CT head, CTA brain neg, neuro evaluated.      * New onset of slurred speech(now resolved per daughter) likely related to metabolic encephalopathy which has now resolved - ruled out stroke , ruled out acute cardiac etiology, TIA work up neg   * worsening dementia  - CT unremarkable.  - MRI showed Old Lacunar infarcts in the left cerebellum  - MRA negative   - cardiology workup negative  - ASA and statin started  - neurochecks as per routine      *Fall secondary to unsteady gait/ chronic severe R hip arthrosis  - Xray negative for fractures  - CT Pelvis showed severe right acetabular dysplasia   - Ortho consult aprpeacited  - Continue pain regimen.Physical therapy consult     *UTI /Cystitis/ hematuria  - on ceftriaxone day #3-(2/23/18)   - urine culture grew proteus, pending sensitivity  -will continue IV ceftraixone today as patient had mild fever 100.5 overnight, would switch to po abx if remains afebrile for 24 hrs and after sensitivity result   - if remains afebrile then would repeat CXR and check for other infectious source  - monitor H/H- stable thus far      * Wheezing/ Probable COPD - acute hypoxemic respiratory failure  - CT chest showed right basilar atelectasis and severe centrilobular emphysema  - c/w nebulizer treatment  - eveluate for home O2-  - switch to prednisone po and inhaled steroids and neb tx  -outpatient f/u w/ dr barrera and for PFt evaluation      * hypothyroid  - will check TSH    * DVT prophylaxis  - heparin SQ    would need acute rehab, PT consult pending  Case d/w team on IDR.

## 2018-02-23 NOTE — PHYSICAL THERAPY INITIAL EVALUATION ADULT - MODALITIES TREATMENT COMMENTS
pt left seated in chair post Eval; chair alarm donned; O2 3L/min nc in place; daughter / RN Margaret present; callbell in reach; pt instructed not to get up alone; call nursing for assist; marce fair; denied pain

## 2018-02-24 LAB
ALBUMIN SERPL ELPH-MCNC: 2.7 G/DL — LOW (ref 3.3–5)
ALP SERPL-CCNC: 69 U/L — SIGNIFICANT CHANGE UP (ref 40–120)
ALT FLD-CCNC: 18 U/L — SIGNIFICANT CHANGE UP (ref 12–78)
ANION GAP SERPL CALC-SCNC: 5 MMOL/L — SIGNIFICANT CHANGE UP (ref 5–17)
AST SERPL-CCNC: 16 U/L — SIGNIFICANT CHANGE UP (ref 15–37)
BASOPHILS # BLD AUTO: 0 K/UL — SIGNIFICANT CHANGE UP (ref 0–0.2)
BASOPHILS NFR BLD AUTO: 0.4 % — SIGNIFICANT CHANGE UP (ref 0–2)
BILIRUB SERPL-MCNC: 0.3 MG/DL — SIGNIFICANT CHANGE UP (ref 0.2–1.2)
BUN SERPL-MCNC: 29 MG/DL — HIGH (ref 7–23)
CALCIUM SERPL-MCNC: 8.3 MG/DL — LOW (ref 8.5–10.1)
CHLORIDE SERPL-SCNC: 107 MMOL/L — SIGNIFICANT CHANGE UP (ref 96–108)
CO2 SERPL-SCNC: 31 MMOL/L — SIGNIFICANT CHANGE UP (ref 22–31)
CREAT SERPL-MCNC: 0.47 MG/DL — LOW (ref 0.5–1.3)
EOSINOPHIL # BLD AUTO: 0 K/UL — SIGNIFICANT CHANGE UP (ref 0–0.5)
EOSINOPHIL NFR BLD AUTO: 0.6 % — SIGNIFICANT CHANGE UP (ref 0–6)
GLUCOSE SERPL-MCNC: 103 MG/DL — HIGH (ref 70–99)
HCT VFR BLD CALC: 36 % — SIGNIFICANT CHANGE UP (ref 34.5–45)
HGB BLD-MCNC: 11.5 G/DL — SIGNIFICANT CHANGE UP (ref 11.5–15.5)
LYMPHOCYTES # BLD AUTO: 0.8 K/UL — LOW (ref 1–3.3)
LYMPHOCYTES # BLD AUTO: 10.6 % — LOW (ref 13–44)
MCHC RBC-ENTMCNC: 26.9 PG — LOW (ref 27–34)
MCHC RBC-ENTMCNC: 32 GM/DL — SIGNIFICANT CHANGE UP (ref 32–36)
MCV RBC AUTO: 84.2 FL — SIGNIFICANT CHANGE UP (ref 80–100)
MONOCYTES # BLD AUTO: 0.6 K/UL — SIGNIFICANT CHANGE UP (ref 0–0.9)
MONOCYTES NFR BLD AUTO: 8.1 % — SIGNIFICANT CHANGE UP (ref 2–14)
NEUTROPHILS # BLD AUTO: 6.1 K/UL — SIGNIFICANT CHANGE UP (ref 1.8–7.4)
NEUTROPHILS NFR BLD AUTO: 80.4 % — HIGH (ref 43–77)
PLATELET # BLD AUTO: 121 K/UL — LOW (ref 150–400)
POTASSIUM SERPL-MCNC: 3.6 MMOL/L — SIGNIFICANT CHANGE UP (ref 3.5–5.3)
POTASSIUM SERPL-SCNC: 3.6 MMOL/L — SIGNIFICANT CHANGE UP (ref 3.5–5.3)
PROT SERPL-MCNC: 6.1 GM/DL — SIGNIFICANT CHANGE UP (ref 6–8.3)
RBC # BLD: 4.28 M/UL — SIGNIFICANT CHANGE UP (ref 3.8–5.2)
RBC # FLD: 13 % — SIGNIFICANT CHANGE UP (ref 10.3–14.5)
SODIUM SERPL-SCNC: 143 MMOL/L — SIGNIFICANT CHANGE UP (ref 135–145)
WBC # BLD: 7.6 K/UL — SIGNIFICANT CHANGE UP (ref 3.8–10.5)
WBC # FLD AUTO: 7.6 K/UL — SIGNIFICANT CHANGE UP (ref 3.8–10.5)

## 2018-02-24 PROCEDURE — 99233 SBSQ HOSP IP/OBS HIGH 50: CPT

## 2018-02-24 RX ORDER — CEFDINIR 250 MG/5ML
300 POWDER, FOR SUSPENSION ORAL
Qty: 0 | Refills: 0 | Status: DISCONTINUED | OUTPATIENT
Start: 2018-02-24 | End: 2018-02-26

## 2018-02-24 RX ADMIN — ATORVASTATIN CALCIUM 40 MILLIGRAM(S): 80 TABLET, FILM COATED ORAL at 21:25

## 2018-02-24 RX ADMIN — Medication 0.25 MILLIGRAM(S): at 21:03

## 2018-02-24 RX ADMIN — OLANZAPINE 5 MILLIGRAM(S): 15 TABLET, FILM COATED ORAL at 21:25

## 2018-02-24 RX ADMIN — Medication 81 MILLIGRAM(S): at 11:13

## 2018-02-24 RX ADMIN — HEPARIN SODIUM 5000 UNIT(S): 5000 INJECTION INTRAVENOUS; SUBCUTANEOUS at 05:59

## 2018-02-24 RX ADMIN — Medication 3 MILLILITER(S): at 14:09

## 2018-02-24 RX ADMIN — MIRTAZAPINE 30 MILLIGRAM(S): 45 TABLET, ORALLY DISINTEGRATING ORAL at 21:25

## 2018-02-24 RX ADMIN — Medication 100 MICROGRAM(S): at 06:00

## 2018-02-24 RX ADMIN — Medication 3 MILLILITER(S): at 21:02

## 2018-02-24 RX ADMIN — Medication 40 MILLIGRAM(S): at 06:00

## 2018-02-24 RX ADMIN — CEFDINIR 300 MILLIGRAM(S): 250 POWDER, FOR SUSPENSION ORAL at 18:09

## 2018-02-24 RX ADMIN — HEPARIN SODIUM 5000 UNIT(S): 5000 INJECTION INTRAVENOUS; SUBCUTANEOUS at 18:09

## 2018-02-24 NOTE — PROGRESS NOTE ADULT - ASSESSMENT
Severe COPD/Emphysema.  nebs, O2, will add neb budesonide, prednisone 30 mg/d today, then plan to taper:  20, 10, off.  assess for need for home O2 before before d/c.  will need outpatient PFT's. will follow as outpatient.  Aspiration precautions.   Kyphoscoliosis.  Likely component of restrictive lung disease.  Secondary mild pulmonary hypertension.    UTI. Leukocytosis.  IV Abx.  Per primary team.  Fall. As per primary team.

## 2018-02-24 NOTE — PROGRESS NOTE ADULT - ASSESSMENT
· Assessment		  85 year old woman with PMH of hypothyroidism, psychotic depression, mild dementia, BIB dtr for c/o b/l hip pain after fall yesterday, daughter reports slurred speech, stroke code called in ED, CT head, CTA brain neg, neuro evaluated.      * New onset of slurred speech(now resolved per daughter) likely related to metabolic encephalopathy which has now resolved - ruled out stroke , ruled out acute cardiac etiology, TIA work up neg   * worsening dementia  - CT unremarkable.  - MRI showed Old Lacunar infarcts in the left cerebellum  - MRA negative- cardiology workup negative  - ASA and statin started  - neurochecks as per routine      *Fall secondary to unsteady gait/ chronic severe R hip arthrosis  - Xray negative for fractures  - CT Pelvis showed severe right acetabular dysplasia   - Ortho consult aprpeacited  - Continue pain regimen.Physical therapy consult     *UTI /Cystitis/ hematuria  - on ceftriaxone day #3-(2/23/18)   - D/c ceftriaxone 2/24/18 and change to po ceftin for 4 days   - urine culture grew proteus, sensitive to cephalospirine  -will continue IV ceftraixone today as patient had mild fever 100.5 overnight, would switch to po abx if remains afebrile for 24 hrs and after sensitivity result     - monitor H/H- stable thus far      * Wheezing/ Probable COPD - acute hypoxemic respiratory failure  - CT chest showed right basilar atelectasis and severe centrilobular emphysema  - c/w nebulizer treatment  - eveluate for home O2-  - on predisone taper and inhaled steroids and neb tx  -outpatient f/u w/ dr barrera and for PFt evaluation    * hypothyroid  - will check TSH    * DVT prophylaxis  - heparin SQ    would need acute rehab vs home PT,,awaiting arrangement probable d/c 2/25/18  Case d/w team on IDR. · Assessment		  85 year old woman with PMH of hypothyroidism, psychotic depression, mild dementia, BIB dtr for c/o b/l hip pain after fall yesterday, daughter reports slurred speech, stroke code called in ED, CT head, CTA brain neg, neuro evaluated.      * New onset of slurred speech(now resolved per daughter) likely related to metabolic encephalopathy which has now resolved - ruled out stroke , ruled out acute cardiac etiology, TIA work up neg   * worsening dementia  - CT unremarkable.  - MRI showed Old Lacunar infarcts in the left cerebellum  - MRA negative- cardiology workup negative  - ASA and statin started  - neurochecks as per routine      *Fall secondary to unsteady gait/ chronic severe R hip arthrosis  - Xray negative for fractures  - CT Pelvis showed severe right acetabular dysplasia   - Ortho consult aprpeacited  - Continue pain regimen.Physical therapy consult     *UTI /Cystitis/ hematuria  - on ceftriaxone day #3-(2/23/18)   - D/c ceftriaxone 2/24/18 and change to po ceftin for 4 days  to complete totatl 7 days course of abx  - urine culture grew proteus, sensitive to cephalospirine  -will continue IV ceftraixone today as patient had mild fever 100.5 overnight, would switch to po abx if remains afebrile for 24 hrs and after sensitivity result     - monitor H/H- stable thus far      * Wheezing/ Probable COPD - acute hypoxemic respiratory failure  - CT chest showed right basilar atelectasis and severe centrilobular emphysema  - c/w nebulizer treatment  - eveluate for home O2-  - on predisone taper and inhaled steroids and neb tx  -outpatient f/u w/ dr barrera and for PFt evaluation    * hypothyroid  - will check TSH    * DVT prophylaxis  - heparin SQ    would need acute rehab vs home PT,,awaiting arrangement probable d/c 2/25/18  Case d/w team on IDR.

## 2018-02-25 LAB
ANION GAP SERPL CALC-SCNC: 7 MMOL/L — SIGNIFICANT CHANGE UP (ref 5–17)
ANISOCYTOSIS BLD QL: SLIGHT — SIGNIFICANT CHANGE UP
BASO STIPL BLD QL SMEAR: SLIGHT — SIGNIFICANT CHANGE UP
BASOPHILS # BLD AUTO: SIGNIFICANT CHANGE UP K/UL (ref 0–0.2)
BASOPHILS NFR BLD AUTO: 0 % — SIGNIFICANT CHANGE UP (ref 0–2)
BUN SERPL-MCNC: 23 MG/DL — SIGNIFICANT CHANGE UP (ref 7–23)
CALCIUM SERPL-MCNC: 8.1 MG/DL — LOW (ref 8.5–10.1)
CHLORIDE SERPL-SCNC: 102 MMOL/L — SIGNIFICANT CHANGE UP (ref 96–108)
CO2 SERPL-SCNC: 32 MMOL/L — HIGH (ref 22–31)
CREAT SERPL-MCNC: 0.54 MG/DL — SIGNIFICANT CHANGE UP (ref 0.5–1.3)
EOSINOPHIL # BLD AUTO: SIGNIFICANT CHANGE UP K/UL (ref 0–0.5)
EOSINOPHIL NFR BLD AUTO: 2 % — SIGNIFICANT CHANGE UP (ref 0–6)
GLUCOSE SERPL-MCNC: 101 MG/DL — HIGH (ref 70–99)
HCT VFR BLD CALC: 36.3 % — SIGNIFICANT CHANGE UP (ref 34.5–45)
HGB BLD-MCNC: 11.9 G/DL — SIGNIFICANT CHANGE UP (ref 11.5–15.5)
LYMPHOCYTES # BLD AUTO: 12 % — LOW (ref 13–44)
LYMPHOCYTES # BLD AUTO: SIGNIFICANT CHANGE UP K/UL (ref 1–3.3)
MANUAL DIF COMMENT BLD-IMP: SIGNIFICANT CHANGE UP
MCHC RBC-ENTMCNC: 27.4 PG — SIGNIFICANT CHANGE UP (ref 27–34)
MCHC RBC-ENTMCNC: 32.6 GM/DL — SIGNIFICANT CHANGE UP (ref 32–36)
MCV RBC AUTO: 84 FL — SIGNIFICANT CHANGE UP (ref 80–100)
MONOCYTES # BLD AUTO: SIGNIFICANT CHANGE UP K/UL (ref 0–0.9)
MONOCYTES NFR BLD AUTO: 12 % — SIGNIFICANT CHANGE UP (ref 2–14)
NEUTROPHILS # BLD AUTO: SIGNIFICANT CHANGE UP K/UL (ref 1.8–7.4)
NEUTROPHILS NFR BLD AUTO: 74 % — SIGNIFICANT CHANGE UP (ref 43–77)
PLAT MORPH BLD: NORMAL — SIGNIFICANT CHANGE UP
PLATELET # BLD AUTO: 114 K/UL — LOW (ref 150–400)
PLATELET COUNT - ESTIMATE: (no result)
POTASSIUM SERPL-MCNC: 3.2 MMOL/L — LOW (ref 3.5–5.3)
POTASSIUM SERPL-SCNC: 3.2 MMOL/L — LOW (ref 3.5–5.3)
RBC # BLD: 4.32 M/UL — SIGNIFICANT CHANGE UP (ref 3.8–5.2)
RBC # FLD: 13.1 % — SIGNIFICANT CHANGE UP (ref 10.3–14.5)
RBC BLD AUTO: (no result)
SODIUM SERPL-SCNC: 141 MMOL/L — SIGNIFICANT CHANGE UP (ref 135–145)
WBC # BLD: 8.5 K/UL — SIGNIFICANT CHANGE UP (ref 3.8–10.5)
WBC # FLD AUTO: 8.5 K/UL — SIGNIFICANT CHANGE UP (ref 3.8–10.5)

## 2018-02-25 PROCEDURE — 99233 SBSQ HOSP IP/OBS HIGH 50: CPT

## 2018-02-25 RX ORDER — POTASSIUM CHLORIDE 20 MEQ
40 PACKET (EA) ORAL ONCE
Qty: 0 | Refills: 0 | Status: COMPLETED | OUTPATIENT
Start: 2018-02-25 | End: 2018-02-25

## 2018-02-25 RX ADMIN — CEFDINIR 300 MILLIGRAM(S): 250 POWDER, FOR SUSPENSION ORAL at 17:35

## 2018-02-25 RX ADMIN — Medication 0.25 MILLIGRAM(S): at 07:43

## 2018-02-25 RX ADMIN — HEPARIN SODIUM 5000 UNIT(S): 5000 INJECTION INTRAVENOUS; SUBCUTANEOUS at 05:24

## 2018-02-25 RX ADMIN — MIRTAZAPINE 30 MILLIGRAM(S): 45 TABLET, ORALLY DISINTEGRATING ORAL at 21:27

## 2018-02-25 RX ADMIN — Medication 30 MILLIGRAM(S): at 05:25

## 2018-02-25 RX ADMIN — Medication 81 MILLIGRAM(S): at 12:47

## 2018-02-25 RX ADMIN — Medication 100 MICROGRAM(S): at 05:24

## 2018-02-25 RX ADMIN — CEFDINIR 300 MILLIGRAM(S): 250 POWDER, FOR SUSPENSION ORAL at 05:24

## 2018-02-25 RX ADMIN — OLANZAPINE 5 MILLIGRAM(S): 15 TABLET, FILM COATED ORAL at 21:27

## 2018-02-25 RX ADMIN — Medication 3 MILLILITER(S): at 20:45

## 2018-02-25 RX ADMIN — Medication 0.25 MILLIGRAM(S): at 20:45

## 2018-02-25 RX ADMIN — ATORVASTATIN CALCIUM 40 MILLIGRAM(S): 80 TABLET, FILM COATED ORAL at 21:27

## 2018-02-25 RX ADMIN — Medication 40 MILLIEQUIVALENT(S): at 17:35

## 2018-02-25 RX ADMIN — HEPARIN SODIUM 5000 UNIT(S): 5000 INJECTION INTRAVENOUS; SUBCUTANEOUS at 17:36

## 2018-02-25 RX ADMIN — Medication 3 MILLILITER(S): at 07:42

## 2018-02-25 NOTE — PROGRESS NOTE ADULT - ASSESSMENT
· Assessment		  85 year old woman with PMH of hypothyroidism, psychotic depression, mild dementia, BIB dtr for c/o b/l hip pain after fall yesterday, daughter reports slurred speech, stroke code called in ED, CT head, CTA brain neg, neuro evaluated.      * New onset of slurred speech(now resolved per daughter) likely related to metabolic encephalopathy which has now resolved - ruled out stroke , ruled out acute cardiac etiology, TIA work up neg   * worsening dementia  - CT unremarkable.  - MRI showed Old Lacunar infarcts in the left cerebellum  - MRA negative- cardiology workup negative  - ASA and statin started  - neurochecks as per routine  *Fall secondary to unsteady gait/ chronic severe R hip arthrosis  - Xray negative for fractures  - CT Pelvis showed severe right acetabular dysplasia   - Ortho consult aprpeacited  - Continue pain regimen.Physical therapy consult     *UTI /Cystitis/ hematuria  - on ceftriaxone day #3-(2/23/18)   - D/c ceftriaxone 2/24/18 and change to po ceftin for 4 days  to complete totatl 7 days course of abx  - urine culture grew proteus, sensitive to cephalospirine   monitor H/H- stable thus far      * Wheezing/ Probable COPD - acute hypoxemic respiratory failure  - CT chest showed right basilar atelectasis and severe centrilobular emphysema  - c/w nebulizer treatment  - eveluate for home O2-  - on predisone taper and inhaled steroids and neb tx  -outpatient f/u w/ dr barrera and for PFt evaluation    * hypothyroid  - will check TSH    * DVT prophylaxis  - heparin SQ    # repleted K    would need  home PT,,awaiting arrangement likely d/c 2/25/18,awaiting home )2 eval  Case d/w team on IDR.

## 2018-02-26 ENCOUNTER — TRANSCRIPTION ENCOUNTER (OUTPATIENT)
Age: 83
End: 2018-02-26

## 2018-02-26 VITALS
OXYGEN SATURATION: 98 % | HEART RATE: 93 BPM | RESPIRATION RATE: 18 BRPM | TEMPERATURE: 98 F | SYSTOLIC BLOOD PRESSURE: 123 MMHG | DIASTOLIC BLOOD PRESSURE: 77 MMHG

## 2018-02-26 LAB
ANION GAP SERPL CALC-SCNC: 4 MMOL/L — LOW (ref 5–17)
BASOPHILS # BLD AUTO: 0 K/UL — SIGNIFICANT CHANGE UP (ref 0–0.2)
BASOPHILS NFR BLD AUTO: 0.4 % — SIGNIFICANT CHANGE UP (ref 0–2)
BUN SERPL-MCNC: 22 MG/DL — SIGNIFICANT CHANGE UP (ref 7–23)
CALCIUM SERPL-MCNC: 8.4 MG/DL — LOW (ref 8.5–10.1)
CHLORIDE SERPL-SCNC: 103 MMOL/L — SIGNIFICANT CHANGE UP (ref 96–108)
CO2 SERPL-SCNC: 32 MMOL/L — HIGH (ref 22–31)
CREAT SERPL-MCNC: 0.51 MG/DL — SIGNIFICANT CHANGE UP (ref 0.5–1.3)
EOSINOPHIL # BLD AUTO: 0.2 K/UL — SIGNIFICANT CHANGE UP (ref 0–0.5)
EOSINOPHIL NFR BLD AUTO: 2.5 % — SIGNIFICANT CHANGE UP (ref 0–6)
GLUCOSE SERPL-MCNC: 101 MG/DL — HIGH (ref 70–99)
HCT VFR BLD CALC: 37 % — SIGNIFICANT CHANGE UP (ref 34.5–45)
HGB BLD-MCNC: 12.3 G/DL — SIGNIFICANT CHANGE UP (ref 11.5–15.5)
LYMPHOCYTES # BLD AUTO: 1.1 K/UL — SIGNIFICANT CHANGE UP (ref 1–3.3)
LYMPHOCYTES # BLD AUTO: 11.6 % — LOW (ref 13–44)
MCHC RBC-ENTMCNC: 27.8 PG — SIGNIFICANT CHANGE UP (ref 27–34)
MCHC RBC-ENTMCNC: 33.2 GM/DL — SIGNIFICANT CHANGE UP (ref 32–36)
MCV RBC AUTO: 83.6 FL — SIGNIFICANT CHANGE UP (ref 80–100)
MONOCYTES # BLD AUTO: 0.6 K/UL — SIGNIFICANT CHANGE UP (ref 0–0.9)
MONOCYTES NFR BLD AUTO: 6.8 % — SIGNIFICANT CHANGE UP (ref 2–14)
NEUTROPHILS # BLD AUTO: 7.2 K/UL — SIGNIFICANT CHANGE UP (ref 1.8–7.4)
NEUTROPHILS NFR BLD AUTO: 78.6 % — HIGH (ref 43–77)
PLATELET # BLD AUTO: 132 K/UL — LOW (ref 150–400)
POTASSIUM SERPL-MCNC: 4.1 MMOL/L — SIGNIFICANT CHANGE UP (ref 3.5–5.3)
POTASSIUM SERPL-SCNC: 4.1 MMOL/L — SIGNIFICANT CHANGE UP (ref 3.5–5.3)
RBC # BLD: 4.42 M/UL — SIGNIFICANT CHANGE UP (ref 3.8–5.2)
RBC # FLD: 12.8 % — SIGNIFICANT CHANGE UP (ref 10.3–14.5)
SODIUM SERPL-SCNC: 139 MMOL/L — SIGNIFICANT CHANGE UP (ref 135–145)
WBC # BLD: 9.2 K/UL — SIGNIFICANT CHANGE UP (ref 3.8–10.5)
WBC # FLD AUTO: 9.2 K/UL — SIGNIFICANT CHANGE UP (ref 3.8–10.5)

## 2018-02-26 PROCEDURE — 99233 SBSQ HOSP IP/OBS HIGH 50: CPT

## 2018-02-26 RX ORDER — ATORVASTATIN CALCIUM 80 MG/1
1 TABLET, FILM COATED ORAL
Qty: 0 | Refills: 0 | COMMUNITY
Start: 2018-02-26

## 2018-02-26 RX ORDER — TRAMADOL HYDROCHLORIDE 50 MG/1
0.5 TABLET ORAL
Qty: 10 | Refills: 0 | OUTPATIENT
Start: 2018-02-26 | End: 2018-03-02

## 2018-02-26 RX ORDER — DOCUSATE SODIUM 100 MG
1 CAPSULE ORAL
Qty: 0 | Refills: 0 | COMMUNITY
Start: 2018-02-26

## 2018-02-26 RX ORDER — OLANZAPINE 15 MG/1
1 TABLET, FILM COATED ORAL
Qty: 0 | Refills: 0 | COMMUNITY
Start: 2018-02-26

## 2018-02-26 RX ORDER — OLANZAPINE 15 MG/1
1 TABLET, FILM COATED ORAL
Qty: 0 | Refills: 0 | COMMUNITY

## 2018-02-26 RX ORDER — LEVOTHYROXINE SODIUM 125 MCG
1 TABLET ORAL
Qty: 0 | Refills: 0 | COMMUNITY
Start: 2018-02-26

## 2018-02-26 RX ORDER — IPRATROPIUM/ALBUTEROL SULFATE 18-103MCG
3 AEROSOL WITH ADAPTER (GRAM) INHALATION
Qty: 0 | Refills: 0 | COMMUNITY
Start: 2018-02-26

## 2018-02-26 RX ORDER — LEVOTHYROXINE SODIUM 125 MCG
1 TABLET ORAL
Qty: 0 | Refills: 0 | COMMUNITY

## 2018-02-26 RX ORDER — MIRTAZAPINE 45 MG/1
1 TABLET, ORALLY DISINTEGRATING ORAL
Qty: 0 | Refills: 0 | COMMUNITY

## 2018-02-26 RX ORDER — TRAMADOL HYDROCHLORIDE 50 MG/1
0.5 TABLET ORAL
Qty: 0 | Refills: 0 | COMMUNITY
Start: 2018-02-26

## 2018-02-26 RX ORDER — BUDESONIDE, MICRONIZED 100 %
1 POWDER (GRAM) MISCELLANEOUS
Qty: 0 | Refills: 0 | COMMUNITY
Start: 2018-02-26

## 2018-02-26 RX ORDER — LACTOBACILLUS ACIDOPHILUS 100MM CELL
1 CAPSULE ORAL
Qty: 0 | Refills: 0 | COMMUNITY
Start: 2018-02-26

## 2018-02-26 RX ORDER — MIRTAZAPINE 45 MG/1
1 TABLET, ORALLY DISINTEGRATING ORAL
Qty: 0 | Refills: 0 | COMMUNITY
Start: 2018-02-26

## 2018-02-26 RX ORDER — ASPIRIN/CALCIUM CARB/MAGNESIUM 324 MG
1 TABLET ORAL
Qty: 0 | Refills: 0 | COMMUNITY
Start: 2018-02-26

## 2018-02-26 RX ORDER — CEFDINIR 250 MG/5ML
1 POWDER, FOR SUSPENSION ORAL
Qty: 0 | Refills: 0 | COMMUNITY
Start: 2018-02-26 | End: 2018-03-01

## 2018-02-26 RX ORDER — IBUPROFEN 200 MG
2 TABLET ORAL
Qty: 0 | Refills: 0 | COMMUNITY

## 2018-02-26 RX ORDER — ACETAMINOPHEN 500 MG
2 TABLET ORAL
Qty: 0 | Refills: 0 | COMMUNITY
Start: 2018-02-26

## 2018-02-26 RX ORDER — LACTOBACILLUS ACIDOPHILUS 100MM CELL
1 CAPSULE ORAL
Qty: 0 | Refills: 0 | Status: DISCONTINUED | OUTPATIENT
Start: 2018-02-26 | End: 2018-02-26

## 2018-02-26 RX ADMIN — Medication 1 TABLET(S): at 17:25

## 2018-02-26 RX ADMIN — HEPARIN SODIUM 5000 UNIT(S): 5000 INJECTION INTRAVENOUS; SUBCUTANEOUS at 06:11

## 2018-02-26 RX ADMIN — Medication 3 MILLILITER(S): at 14:27

## 2018-02-26 RX ADMIN — HEPARIN SODIUM 5000 UNIT(S): 5000 INJECTION INTRAVENOUS; SUBCUTANEOUS at 17:25

## 2018-02-26 RX ADMIN — Medication 3 MILLILITER(S): at 08:53

## 2018-02-26 RX ADMIN — INFLUENZA VIRUS VACCINE 0.5 MILLILITER(S): 15; 15; 15; 15 SUSPENSION INTRAMUSCULAR at 17:43

## 2018-02-26 RX ADMIN — Medication 100 MICROGRAM(S): at 06:11

## 2018-02-26 RX ADMIN — Medication 81 MILLIGRAM(S): at 12:55

## 2018-02-26 RX ADMIN — Medication 1 TABLET(S): at 12:55

## 2018-02-26 RX ADMIN — Medication 20 MILLIGRAM(S): at 06:11

## 2018-02-26 RX ADMIN — Medication 0.25 MILLIGRAM(S): at 08:57

## 2018-02-26 RX ADMIN — CEFDINIR 300 MILLIGRAM(S): 250 POWDER, FOR SUSPENSION ORAL at 06:11

## 2018-02-26 RX ADMIN — CEFDINIR 300 MILLIGRAM(S): 250 POWDER, FOR SUSPENSION ORAL at 17:24

## 2018-02-26 NOTE — DISCHARGE NOTE ADULT - HOSPITAL COURSE
85 year old woman with PMH of hypothyroidism, psychotic depression, mild dementia, BIB dtr for c/o b/l hip pain after fall yesterday, daughter reports slurred speech, stroke code called in ED, CT head, CTA brain neg, neuro evaluated. MRI-with no acute infarcts.     2/26- no overnight events, as per daughter she's had diarrhea x3- no leukocytosis- no fever or abdominal cramps. Will start Probiotics.     Vital Signs Last 24 Hrs  T(C): 36.6 (26 Feb 2018 11:46), Max: 36.7 (25 Feb 2018 21:11)  T(F): 97.8 (26 Feb 2018 11:46), Max: 98.1 (25 Feb 2018 21:11)  HR: 93 (26 Feb 2018 11:46) (72 - 93)  BP: 123/77 (26 Feb 2018 11:46) (123/77 - 150/78)  BP(mean): --  RR: 18 (26 Feb 2018 11:46) (18 - 19)  SpO2: 98% (26 Feb 2018 11:46) (87% - 98%)    ROS:   All 10 systems reviewed and found to be negative with the exception of what has been described above.    PE:  Constitutional: NAD, laying in bed  HEENT: NC/AT, EOMI, PERRLA  Neck: supple  Back: no tenderness  Respiratory: LCTA- decreased at the base  Cardiovascular: S1S2 regular, no murmurs  Abdomen: soft, not tender, not distended, positive BS  Genitourinary: voiding  Rectal: deferred  Musculoskeletal: no muscle tenderness, no joint swelling or tenderness  Extremities: no pedal edema   Neurological: no focal deficits    MEDICATIONS  (STANDING):  ALBUTerol/ipratropium for Nebulization 3 milliLiter(s) Nebulizer every 6 hours  aspirin  chewable 81 milliGRAM(s) Oral daily  atorvastatin 40 milliGRAM(s) Oral at bedtime  buDESOnide   0.25 milliGRAM(s) Respule 0.25 milliGRAM(s) Inhalation every 12 hours  cefdinir 300 milliGRAM(s) Oral two times a day  docusate sodium 100 milliGRAM(s) Oral three times a day  heparin  Injectable 5000 Unit(s) SubCutaneous every 12 hours  influenza   Vaccine 0.5 milliLiter(s) IntraMuscular once  lactobacillus acidophilus 1 Tablet(s) Oral three times a day with meals  levothyroxine 100 MICROGram(s) Oral daily  mirtazapine 30 milliGRAM(s) Oral at bedtime  OLANZapine 5 milliGRAM(s) Oral at bedtime    PLAN    * New onset of slurred speech(now resolved per daughter) likely related to metabolic encephalopathy which has now resolved - ruled out stroke , ruled out acute cardiac etiology, TIA work up neg - MRI negative for Acute infarct  * worsening dementia  - CT unremarkable.  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 Physical therapy consult     *UTI /Cystitis/ hematuria  - on ceftriaxone day #3-(2/23/18)   - D/c ceftriaxone 2/24/18 and change to po ceftin for 4 days  to complete totatl 7 days course of abx  - urine culture grew proteus, sensitive to cephalosporin   monitor H/H- stable thus far      * Wheezing/ Probable COPD - acute hypoxemic respiratory failure  - CT chest showed right basilar atelectasis and severe centrilobular emphysema  - c/w nebulizer treatment  - evaluate for home O2-  - on prednisone taper and inhaled steroids and neb tx  -outpatient f/u w/ dr barrera and for PFt evaluation    * Hypothyroid- continue elevothyroxine

## 2018-02-26 NOTE — DISCHARGE NOTE ADULT - PLAN OF CARE
supportive care c/w medication regimen resolution complete antibiotic regimen as prescribed. Notify your PCP if your symptom has worsened or if you develop excessive diarrhea while on antibiotic.   F/U with your PCP in the community after discharge from rehab f/u with PC after discharge from rehab.  contienu ASA And statin f/u with Dr Berger as an outpatient  - continue prednisone   - contienu nebulizers supportive care  chronic severe right hip arthrosis  Xray negative for fracture  f/u with Ortho as an outpatient

## 2018-02-26 NOTE — DISCHARGE NOTE ADULT - CARE PROVIDERS DIRECT ADDRESSES
,DirectAddress_Unknown,DirectAddress_Unknown ,DirectAddress_Unknown,DirectAddress_Unknown,[email or web address]

## 2018-02-26 NOTE — PROGRESS NOTE ADULT - ASSESSMENT
Severe COPD/Emphysema.  nebs, O2, neb budesonide, prednisone taper: 20. 10, off.   assess for need for home O2 before before d/c.   will need outpatient PFT's.  will follow as outpatient.  Aspiration precautions.   Kyphoscoliosis.  Likely component of restrictive lung disease.  Secondary mild pulmonary hypertension.    UTI. on p.o. Abx.  Per primary team.  Fall. As per primary team.

## 2018-02-26 NOTE — DISCHARGE NOTE ADULT - MEDICATION SUMMARY - MEDICATIONS TO TAKE
I will START or STAY ON the medications listed below when I get home from the hospital:    predniSONE 10 mg oral tablet  -- 1 tab(s) by mouth once  -- Indication: For Steroid taper    budesonide 0.25 mg/2 mL inhalation suspension  -- 1 inhaler(s) inhaled every 12 hours  -- Indication: For Bronchodilator    traMADol 50 mg oral tablet  -- 0.5 tab(s) by mouth every 6 hours, As needed, Moderate Pain (4 - 6)  -- Indication: For Analgesic    acetaminophen 325 mg oral tablet  -- 2 tab(s) by mouth every 6 hours, As needed, Mild Pain (1 - 3)  -- Indication: For Pain of both hip joints    aspirin 81 mg oral tablet, chewable  -- 1 tab(s) by mouth once a day  -- Indication: For Possible TIA    mirtazapine 30 mg oral tablet  -- 1 tab(s) by mouth once a day (at bedtime)  -- Indication: For Antidepressant    atorvastatin 40 mg oral tablet  -- 1 tab(s) by mouth once a day (at bedtime)  -- Indication: For Hyperlipidemi    OLANZapine 5 mg oral tablet  -- 1 tab(s) by mouth once a day (at bedtime)  -- Indication: For Antipsychotics    ipratropium-albuterol 0.5 mg-2.5 mg/3 mLinhalation solution  -- 3 milliliter(s) inhaled every 6 hours  -- Indication: For bronchodilators    cefdinir 300 mg oral capsule  -- 1 cap(s) by mouth 2 times a day  -- Indication: For Pneumonia    docusate sodium 100 mg oral capsule  -- 1 cap(s) by mouth 3 times a day  -- Indication: For constipation    lactobacillus acidophilus oral capsule  -- 1 cap(s) by mouth 3 times a day  -- Indication: For Probitoics    levothyroxine 100 mcg (0.1 mg) oral tablet  -- 1 tab(s) by mouth once a day  -- Indication: For Hypothyroidism, unspecified type

## 2018-02-26 NOTE — DISCHARGE NOTE ADULT - CARE PLAN
Principal Discharge DX:	Urinary tract infection without hematuria, site unspecified  Goal:	resolution  Assessment and plan of treatment:	complete antibiotic regimen as prescribed. Notify your PCP if your symptom has worsened or if you develop excessive diarrhea while on antibiotic.   F/U with your PCP in the community after discharge from rehab  Secondary Diagnosis:	Lacunar infarction  Assessment and plan of treatment:	f/u with PC after discharge from rehab.  contienu ASA And statin  Secondary Diagnosis:	Respiratory failure with hypoxia, unspecified chronicity  Assessment and plan of treatment:	f/u with Dr Berger as an outpatient  - continue prednisone   - contienu nebulizers  Secondary Diagnosis:	Falls frequently  Assessment and plan of treatment:	supportive care  Secondary Diagnosis:	Dementia without behavioral disturbance, unspecified dementia type  Assessment and plan of treatment:	c/w medication regimen Principal Discharge DX:	Urinary tract infection without hematuria, site unspecified  Goal:	resolution  Assessment and plan of treatment:	complete antibiotic regimen as prescribed. Notify your PCP if your symptom has worsened or if you develop excessive diarrhea while on antibiotic.   F/U with your PCP in the community after discharge from rehab  Secondary Diagnosis:	Lacunar infarction  Assessment and plan of treatment:	f/u with PC after discharge from rehab.  contienu ASA And statin  Secondary Diagnosis:	Respiratory failure with hypoxia, unspecified chronicity  Assessment and plan of treatment:	f/u with Dr Berger as an outpatient  - continue prednisone   - contienu nebulizers  Secondary Diagnosis:	Falls frequently  Assessment and plan of treatment:	supportive care  chronic severe right hip arthrosis  Xray negative for fracture  f/u with Ortho as an outpatient  Secondary Diagnosis:	Dementia without behavioral disturbance, unspecified dementia type  Assessment and plan of treatment:	c/w medication regimen

## 2018-02-26 NOTE — DISCHARGE NOTE ADULT - MEDICATION SUMMARY - MEDICATIONS TO STOP TAKING
I will STOP taking the medications listed below when I get home from the hospital:    ibuprofen 200 mg oral tablet  -- 2 tab(s) by mouth 2 times a day, As Needed for pain

## 2018-02-26 NOTE — DISCHARGE NOTE ADULT - PATIENT PORTAL LINK FT
You can access the [facility] Patient Portal, offered by [facility], by registering with the following website: [email or web address]

## 2018-02-26 NOTE — PROGRESS NOTE ADULT - SUBJECTIVE AND OBJECTIVE BOX
85 year old woman w/ PMH hypothyroidism, psychotic depression, mild dementia, BIB dtr for c/o b/l hip pain after fall yesterday. Pt reports that her legs gave out when she was putting something in her drawer causing her to fall backwards and then slid down to her buttocks. Daughter also endorsed some slurred speech and lately has been more unsteady and with polyuria, dysuria and urgency. Stroke code was called in the ED- patient was evaluated by neuro- CT was negative for intracranial bleed. MRI/MRA old lacunar infarctions in the left cerebellum  and global atrophy. Patient was started on ASA and Stain for possible TIA.     - patient was seen and examined. Complaining of hip pain especially when she is moving. slight sob noted with conversation but denies dyspnea    18- patient reports feeling better, denies cough ,no abdominal pain ,no diarrhea , eating well  Constitutional: NAD, laying in bed- cachectic      PHYSICAL EXAM:    Daily     Daily     ICU Vital Signs Last 24 Hrs  T(C): 37.3 (2018 05:22), Max: 38.1 (2018 20:45)  T(F): 99.2 (2018 05:22), Max: 100.5 (2018 20:45)  HR: 92 (2018 05:22) (86 - 92)  BP: 149/85 (2018 05:22) (149/85 - 154/81)  BP(mean): --  ABP: --  ABP(mean): --  RR: 19 (2018 05:22) (18 - 22)  SpO2: 100% (2018 05:22) (97% - 100%)                                12.7   8.7   )-----------( 121      ( 2018 07:05 )             39.1       CBC Full  -  ( 2018 07:05 )  WBC Count : 8.7 K/uL  Hemoglobin : 12.7 g/dL  Hematocrit : 39.1 %  Platelet Count - Automated : 121 K/uL  Mean Cell Volume : 84.3 fl  Mean Cell Hemoglobin : 27.4 pg  Mean Cell Hemoglobin Concentration : 32.6 gm/dL  Auto Neutrophil # : x  Auto Lymphocyte # : x  Auto Monocyte # : x  Auto Eosinophil # : x  Auto Basophil # : x  Auto Neutrophil % : x  Auto Lymphocyte % : x  Auto Monocyte % : x  Auto Eosinophil % : x  Auto Basophil % : x          144  |  108  |  35<H>  ----------------------------<  116<H>  3.9   |  30  |  0.63    Ca    8.5      2018 07:05  Mg     1.8         TPro  7.9  /  Alb  3.5  /  TBili  0.8  /  DBili  x   /  AST  28  /  ALT  22  /  AlkPhos  106        LIVER FUNCTIONS - ( 2018 14:02 )  Alb: 3.5 g/dL / Pro: 7.9 gm/dL / ALK PHOS: 106 U/L / ALT: 22 U/L / AST: 28 U/L / GGT: x                 CARDIAC MARKERS ( 2018 14:02 )  <0.015 ng/mL / x     / x     / x     / x            Urinalysis Basic - ( 2018 15:05 )    Color: Yellow / Appearance: very cloudy / S.020 / pH: x  Gluc: x / Ketone: Moderate  / Bili: Negative / Urobili: Negative mg/dL   Blood: x / Protein: 500 mg/dL / Nitrite: Negative   Leuk Esterase: Small / RBC: >50 /HPF / WBC 6-10   Sq Epi: x / Non Sq Epi: Few / Bacteria: Moderate            MEDICATIONS  (STANDING):  ALBUTerol/ipratropium for Nebulization 3 milliLiter(s) Nebulizer every 6 hours  aspirin  chewable 81 milliGRAM(s) Oral daily  atorvastatin 40 milliGRAM(s) Oral at bedtime  buDESOnide   0.25 milliGRAM(s) Respule 0.25 milliGRAM(s) Inhalation every 12 hours  cefTRIAXone Injectable. 1000 milliGRAM(s) IV Push every 24 hours  docusate sodium 100 milliGRAM(s) Oral three times a day  heparin  Injectable 5000 Unit(s) SubCutaneous every 12 hours  influenza   Vaccine 0.5 milliLiter(s) IntraMuscular once  levothyroxine 100 MICROGram(s) Oral daily  mirtazapine 30 milliGRAM(s) Oral at bedtime  OLANZapine 5 milliGRAM(s) Oral at bedtime  predniSONE   Tablet 40 milliGRAM(s) Oral daily            HEENT: NC/AT, EOMI, PERRLA  Neck: supple  Back: no tenderness  Respiratory: air entry improved since yesterday  Cardiovascular: S1S2 regular, no murmurs  Abdomen: soft, not tender, not distended, positive BS  Genitourinary: voiding  Rectal: deferred  Musculoskeletal: no muscle tenderness, no joint swelling or tenderness  Extremities: no pedal edema   Neurological: no focal deficits
HPI:  85F w/ PMH hypothyroidism, psychotic depression, mild dementia, BIB dtr for c/o b/l hip pain after fall yesterday.  Pt is a good historian and dtr w/ whom pt lives assists w/ hx.  Pt remembers falling while in her bedroom, she thinks she slipped and fell backward incurring hip pain.  Dtr heard thud, came in and found pt on floor, awake and c/o pain.  She did not want to go to hospital initially, but did c/o ongoing hip pain so she was brought in for evaluation.  Pt's hip pain is constant, 9/10, worse w/ movement, nonradiating.  Dtr also states she's noticed pt w/ some slurred speech and lately pt has been more unsteady and having to urinate frequently.  Pt endorses polyuria, dysuria, urgency, and 1 episode of hematuria.  Denies any fever/chills, n/v, abd pain.  Denies frequent falls, normally uses walker to ambulate.   Pt is notably sob w/ conversation, dtr states pt has to stop to rest every 5-10 steps, noted quite awhile.  Dtr noticed pt is always this way although has not been previously worked up as pt does not have PMD.  Pt does not c/o chest pain.  Does note occ wheeze with exertion.  Occas slight cough.   She is former smoker, quit 10 years ago, smoked 1.5ppd x 60yrs (90pyh).  CT scan shows severe emphysematous findings, trace R effusion, some peripheral mild scarring/subseg atelectasis.    In ED, stroke code called, pt evaluated by neuro and determined symptoms not likely d/t stroke.  CT head and CTA are negative, MRI/MRA recommended.  UA +, WBC 12.5, .     2/23:  no distress, not SOB.     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 1000 milliGRAM(s) IV Push every 24 hours  docusate sodium 100 milliGRAM(s) Oral three times a day  heparin  Injectable 5000 Unit(s) SubCutaneous every 12 hours  influenza   Vaccine 0.5 milliLiter(s) IntraMuscular once  levothyroxine 100 MICROGram(s) Oral daily  methylPREDNISolone sodium succinate Injectable 40 milliGRAM(s) IV Push two times a day  mirtazapine 30 milliGRAM(s) Oral at bedtime  OLANZapine 5 milliGRAM(s) Oral at bedtime    MEDICATIONS  (PRN):  acetaminophen   Tablet. 650 milliGRAM(s) Oral every 6 hours PRN Mild Pain (1 - 3)  ondansetron Injectable 4 milliGRAM(s) IV Push every 6 hours PRN Nausea  traMADol 25 milliGRAM(s) Oral every 6 hours PRN Moderate Pain (4 - 6)  traMADol 50 milliGRAM(s) Oral every 6 hours PRN Severe Pain (7 - 10)      Allergies    No Known Allergies    Intolerances        SOCIAL HISTORY: Denies tobacco, etoh abuse or illicit drug use    FAMILY HISTORY:  Family history of lung cancer (Sibling)  Family history of stomach cancer (Sibling)      Vital Signs Last 24 Hrs  T(C): 36.4 (2018 11:19), Max: 36.9 (2018 19:28)  T(F): 97.5 (2018 11:19), Max: 98.5 (2018 19:28)  HR: 86 (2018 11:19) (86 - 98)  BP: 110/90 (2018 11:19) (110/90 - 154/99)  BP(mean): --  RR: 18 (2018 11:19) (16 - 19)  SpO2: 97% (2018 11:19) (92% - 97%)    REVIEW OF SYSTEMS:    CONSTITUTIONAL:  As per HPI.  HEENT:  Eyes:  No diplopia or blurred vision. ENT:  No earache, sore throat or runny nose.  CARDIOVASCULAR:  No pressure, squeezing, tightness, heaviness or aching about the chest, neck, axilla or epigastrium.  RESPIRATORY:  see above.  GASTROINTESTINAL:  No nausea, vomiting or diarrhea.  GENITOURINARY:  No dysuria, frequency or urgency.  MUSCULOSKELETAL:  As per HPI.  SKIN:  No change in skin, hair or nails.  NEUROLOGIC:  No paresthesias, fasciculations, seizures or weakness.  PSYCHIATRIC:  No disorder of thought or mood.  ENDOCRINE:  No heat or cold intolerance, polyuria or polydipsia.  HEMATOLOGICAL:  No easy bruising or bleedings:  .     PHYSICAL EXAMINATION:    GENERAL APPEARANCE:  Pt. is not currently dyspneic, in no distress. Pt. is alert, oriented, and pleasant.  HEENT:  Pupils are normal and react normally. No icterus. Mucous membranes well colored.  NECK:  Supple. No lymphadenopathy. Positive SC retractions.  HEART:   The cardiac impulse has a normal quality. Regular. Distant HS's, 90, reg.  CHEST:  Decreased aud BS's bilat. Pursed lip expirations intermitt.  ABDOMEN:  Soft and nontender.   EXTREMITIES:  There is no cyanosis, clubbing or edema.   SKIN:  No rash or significant lesions are noted.  Neuro: Alert, awake, and O x 3.      LABS:                        12.2   7.9   )-----------( 103      ( 2018 05:57 )             37.1         141  |  107  |  23  ----------------------------<  137<H>  3.7   |  25  |  0.61    Ca    8.2<L>      2018 05:57  Mg     1.8         TPro  7.9  /  Alb  3.5  /  TBili  0.8  /  DBili  x   /  AST  28  /  ALT  22  /  AlkPhos  106      LIVER FUNCTIONS - ( 2018 14:02 )  Alb: 3.5 g/dL / Pro: 7.9 gm/dL / ALK PHOS: 106 U/L / ALT: 22 U/L / AST: 28 U/L / GGT: x             CARDIAC MARKERS ( 2018 14:02 )  <0.015 ng/mL / x     / x     / x     / x          Urinalysis Basic - ( 2018 15:05 )    Color: Yellow / Appearance: very cloudy / S.020 / pH: x  Gluc: x / Ketone: Moderate  / Bili: Negative / Urobili: Negative mg/dL   Blood: x / Protein: 500 mg/dL / Nitrite: Negative   Leuk Esterase: Small / RBC: >50 /HPF / WBC 6-10   Sq Epi: x / Non Sq Epi: Few / Bacteria: Moderate          RADIOLOGY & ADDITIONAL STUDIES:
85 year old woman w/ PMH hypothyroidism, psychotic depression, mild dementia, BIB dtr for c/o b/l hip pain after fall yesterday. Pt reports that her legs gave out when she was putting something in her drawer causing her to fall backwards and then slid down to her buttocks. Daughter also endorsed some slurred speech and lately has been more unsteady and with polyuria, dysuria and urgency. Stroke code was called in the ED- patient was evaluated by neuro- CT was negative for intracranial bleed. MRI/MRA old lacunar infarctions in the left cerebellum  and global atrophy. Patient was started on ASA and Stain for possible TIA.     2/22- patient was seen and examined. Complaining of hip pain especially when she is moving. slight sob noted with conversation but denies dyspnea    2/23/18- patient reports feeling better, denies cough ,no abdominal pain ,no diarrhea , eating well  2/24/18 - no fever for 24 hrs ,completed 23 days of ceftriaxone ,denies dysuria ,no cough ,no abdominal pain , feels better  2/25/18- remains afebrile, no complaints .reports feels more energetic today  Constitutional: NAD, laying in bed- cachectic  HEENT: NC/AT, EOMI, PERRLA  Neck: supple  Back: no tenderness  Respiratory:anir entry present b/l anterior lung fields   Cardiovascular: S1S2 regular, no murmurs  Abdomen: soft, not tender, not distended, positive BS  Genitourinary: voiding  Rectal: deferred  Musculoskeletal: no muscle tenderness, no joint swelling or tenderness  Extremities: no pedal edema   Neurological: no focal deficits      PHYSICAL EXAM:    Daily     Daily     ICU Vital Signs Last 24 Hrs  T(C): 36.8 (25 Feb 2018 05:17), Max: 36.8 (25 Feb 2018 05:17)  T(F): 98.3 (25 Feb 2018 05:17), Max: 98.3 (25 Feb 2018 05:17)  HR: 81 (25 Feb 2018 08:03) (81 - 92)  BP: 139/75 (25 Feb 2018 05:17) (130/58 - 139/75)  BP(mean): --  ABP: --  ABP(mean): --  RR: 19 (25 Feb 2018 05:17) (19 - 19)  SpO2: 94% (25 Feb 2018 08:03) (94% - 97%)                                11.9   8.5   )-----------( 114      ( 25 Feb 2018 07:41 )             36.3       CBC Full  -  ( 25 Feb 2018 07:41 )  WBC Count : 8.5 K/uL  Hemoglobin : 11.9 g/dL  Hematocrit : 36.3 %  Platelet Count - Automated : 114 K/uL  Mean Cell Volume : 84.0 fl  Mean Cell Hemoglobin : 27.4 pg  Mean Cell Hemoglobin Concentration : 32.6 gm/dL  Auto Neutrophil # : see note K/uL  Auto Lymphocyte # : see note K/uL  Auto Monocyte # : see note K/uL  Auto Eosinophil # : see note K/uL  Auto Basophil # : see note K/uL  Auto Neutrophil % : 74.0 %  Auto Lymphocyte % : 12.0 %  Auto Monocyte % : 12.0 %  Auto Eosinophil % : 2.0 %  Auto Basophil % : 0.0 %      02-25    141  |  102  |  23  ----------------------------<  101<H>  3.2<L>   |  32<H>  |  0.54    Ca    8.1<L>      25 Feb 2018 07:41    TPro  6.1  /  Alb  2.7<L>  /  TBili  0.3  /  DBili  x   /  AST  16  /  ALT  18  /  AlkPhos  69  02-24      LIVER FUNCTIONS - ( 24 Feb 2018 08:07 )  Alb: 2.7 g/dL / Pro: 6.1 gm/dL / ALK PHOS: 69 U/L / ALT: 18 U/L / AST: 16 U/L / GGT: x                               MEDICATIONS  (STANDING):  ALBUTerol/ipratropium for Nebulization 3 milliLiter(s) Nebulizer every 6 hours  aspirin  chewable 81 milliGRAM(s) Oral daily  atorvastatin 40 milliGRAM(s) Oral at bedtime  buDESOnide   0.25 milliGRAM(s) Respule 0.25 milliGRAM(s) Inhalation every 12 hours  cefdinir 300 milliGRAM(s) Oral two times a day  docusate sodium 100 milliGRAM(s) Oral three times a day  heparin  Injectable 5000 Unit(s) SubCutaneous every 12 hours  influenza   Vaccine 0.5 milliLiter(s) IntraMuscular once  levothyroxine 100 MICROGram(s) Oral daily  mirtazapine 30 milliGRAM(s) Oral at bedtime  OLANZapine 5 milliGRAM(s) Oral at bedtime  potassium chloride    Tablet ER 40 milliEquivalent(s) Oral once
85 year old woman w/ PMH hypothyroidism, psychotic depression, mild dementia, BIB dtr for c/o b/l hip pain after fall yesterday. Pt reports that her legs gave out when she was putting something in her drawer causing her to fall backwards and then slid down to her buttocks. Daughter also endorsed some slurred speech and lately has been more unsteady and with polyuria, dysuria and urgency. Stroke code was called in the ED- patient was evaluated by neuro- CT was negative for intracranial bleed. MRI/MRA old lacunar infarctions in the left cerebellum  and global atrophy. Patient was started on ASA and Stain for possible TIA.     2/22- patient was seen and examined. Complaining of hip pain especially when she is moving. slight sob noted with conversation but denies dyspnea    2/23/18- patient reports feeling better, denies cough ,no abdominal pain ,no diarrhea , eating well  2/24/18 - no fever for 24 hrs ,completed 23 days of ceftriaxone ,denies dysuria ,no cough ,no abdominal pain , feels better  Constitutional: NAD, laying in bed- cachectic  HEENT: NC/AT, EOMI, PERRLA  Neck: supple  Back: no tenderness  Respiratory:anir entry present b/l anterior lung fields   Cardiovascular: S1S2 regular, no murmurs  Abdomen: soft, not tender, not distended, positive BS  Genitourinary: voiding  Rectal: deferred  Musculoskeletal: no muscle tenderness, no joint swelling or tenderness  Extremities: no pedal edema   Neurological: no focal deficits
85 year old woman w/ PMH hypothyroidism, psychotic depression, mild dementia, BIB dtr for c/o b/l hip pain after fall yesterday. Pt reports that her legs gave out when she was putting something in her drawer causing her to fall backwards and then slid down to her buttocks. Daughter also endorsed some slurred speech and lately has been more unsteady and with polyuria, dysuria and urgency. Stroke code was called in the ED- patient was evaluated by neuro- CT was negative for intracranial bleed. MRI/MRA old lacunar infarctions in the left cerebellum  and global atrophy. Patient was started on ASA and Stain for possible TIA.     2/22- patient was seen and examined. Complaining of hip pain especially when she is moving. slight sob noted with conversation but denies dyspnea    Vital Signs Last 24 Hrs  T(C): 36.4 (22 Feb 2018 11:19), Max: 36.9 (21 Feb 2018 19:28)  T(F): 97.5 (22 Feb 2018 11:19), Max: 98.5 (21 Feb 2018 19:28)  HR: 86 (22 Feb 2018 11:19) (86 - 107)  BP: 110/90 (22 Feb 2018 11:19) (110/90 - 190/90)  BP(mean): --  RR: 18 (22 Feb 2018 11:19) (16 - 19)  SpO2: 97% (22 Feb 2018 11:19) (92% - 97%)  ROS:   All 10 systems reviewed and found to be negative with the exception of what has been described above.    PE:  Constitutional: NAD, laying in bed- cachectic  HEENT: NC/AT, EOMI, PERRLA  Neck: supple  Back: no tenderness  Respiratory: LCTA- decreased at the base  Cardiovascular: S1S2 regular, no murmurs  Abdomen: soft, not tender, not distended, positive BS  Genitourinary: voiding  Rectal: deferred  Musculoskeletal: no muscle tenderness, no joint swelling or tenderness  Extremities: no pedal edema   Neurological: no focal deficits      02-22    141  |  107  |  23  ----------------------------<  137<H>  3.7   |  25  |  0.61    Ca    8.2<L>      22 Feb 2018 05:57  Mg     1.8     02-21    TPro  7.9  /  Alb  3.5  /  TBili  0.8  /  DBili  x   /  AST  28  /  ALT  22  /  AlkPhos  106  02-21                        12.2   7.9   )-----------( 103      ( 22 Feb 2018 05:57 )             37.1     < from: MR Head No Cont (02.22.18 @ 10:07) >  IMPRESSION:   moderate periventricular and deep white matter .  Old   lacunar infarctions are seen in the  LEFT cerebellum.  Global atrophy.    < from: MR Angio Head No Cont (02.22.18 @ 10:07) >  The anterior circulation demonstrates intact petrous, cavernous and   clinoid segments of the internal carotid arteries.  The anterior cerebral   arteries are intact and symmetric. An anterior communicating artery is   demonstrated.  The middle cerebral arteries demonstrate intact initial M1   and M2 segments. There are symmetric intact peripheral Sylvian branches.    The posterior circulation demonstrates intact vertebral, basilar and   posterior cerebral arteries.  There is a fetal origin to the LEFT   posterior cerebral artery. The LEFT vertebral artery is dominant.    No abnormal vessel termination, vascular malformation or intracranial   aneurysm is recognized.    < from: CT Chest No Cont (02.22.18 @ 09:23) >  IMPRESSION:         Trace right pleural effusion with right basilar atelectasis. Peripheral   areas of subsegmental atelectasis in the lingula and anterior left lower   lobe.    Severe centrilobular emphysema.    < from: CT Pelvis Bony Only No Cont (02.22.18 @ 09:17) >  Impression:    No evidence of acute fracture of the bony pelvis. 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   Pseudoarticulation between the right lesser trochanter and ischial   tuberosity.      MEDICATIONS  (STANDING):  ALBUTerol/ipratropium for Nebulization 3 milliLiter(s) Nebulizer every 6 hours  aspirin  chewable 81 milliGRAM(s) Oral daily  atorvastatin 40 milliGRAM(s) Oral at bedtime  cefTRIAXone Injectable. 1000 milliGRAM(s) IV Push every 24 hours  docusate sodium 100 milliGRAM(s) Oral three times a day  heparin  Injectable 5000 Unit(s) SubCutaneous every 12 hours  influenza   Vaccine 0.5 milliLiter(s) IntraMuscular once  levothyroxine 100 MICROGram(s) Oral daily  methylPREDNISolone sodium succinate Injectable 40 milliGRAM(s) IV Push two times a day  mirtazapine 30 milliGRAM(s) Oral at bedtime  OLANZapine 5 milliGRAM(s) Oral at bedtime    MEDICATIONS  (PRN):  acetaminophen   Tablet. 650 milliGRAM(s) Oral every 6 hours PRN Mild Pain (1 - 3)  ondansetron Injectable 4 milliGRAM(s) IV Push every 6 hours PRN Nausea  traMADol 25 milliGRAM(s) Oral every 6 hours PRN Moderate Pain (4 - 6)  traMADol 50 milliGRAM(s) Oral every 6 hours PRN Severe Pain (7 - 10)
HPI:  85F w/ PMH hypothyroidism, psychotic depression, mild dementia, BIB dtr for c/o b/l hip pain after fall yesterday.  Pt is a good historian and dtr w/ whom pt lives assists w/ hx.  Pt remembers falling while in her bedroom, she thinks she slipped and fell backward incurring hip pain.  Dtr heard thud, came in and found pt on floor, awake and c/o pain.  She did not want to go to hospital initially, but did c/o ongoing hip pain so she was brought in for evaluation.  Pt's hip pain is constant, 9/10, worse w/ movement, nonradiating.  Dtr also states she's noticed pt w/ some slurred speech and lately pt has been more unsteady and having to urinate frequently.  Pt endorses polyuria, dysuria, urgency, and 1 episode of hematuria.  Denies any fever/chills, n/v, abd pain.  Denies frequent falls, normally uses walker to ambulate.   Pt is notably sob w/ conversation, dtr states pt has to stop to rest every 5-10 steps, noted quite awhile.  Dtr noticed pt is always this way although has not been previously worked up as pt does not have PMD.  Pt does not c/o chest pain.  Does note occ wheeze with exertion.  Occas slight cough.   She is former smoker, quit 10 years ago, smoked 1.5ppd x 60yrs (90pyh).  CT scan shows severe emphysematous findings, trace R effusion, some peripheral mild scarring/subseg atelectasis.    In ED, stroke code called, pt evaluated by neuro and determined symptoms not likely d/t stroke.  CT head and CTA are negative, MRI/MRA recommended.  UA +, WBC 12.5, .     :  no distress, not SOB.   :  no cough, not in distress, awake.    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 1000 milliGRAM(s) IV Push every 24 hours  docusate sodium 100 milliGRAM(s) Oral three times a day  heparin  Injectable 5000 Unit(s) SubCutaneous every 12 hours  influenza   Vaccine 0.5 milliLiter(s) IntraMuscular once  levothyroxine 100 MICROGram(s) Oral daily  methylPREDNISolone sodium succinate Injectable 40 milliGRAM(s) IV Push two times a day  mirtazapine 30 milliGRAM(s) Oral at bedtime  OLANZapine 5 milliGRAM(s) Oral at bedtime    MEDICATIONS  (PRN):  acetaminophen   Tablet. 650 milliGRAM(s) Oral every 6 hours PRN Mild Pain (1 - 3)  ondansetron Injectable 4 milliGRAM(s) IV Push every 6 hours PRN Nausea  traMADol 25 milliGRAM(s) Oral every 6 hours PRN Moderate Pain (4 - 6)  traMADol 50 milliGRAM(s) Oral every 6 hours PRN Severe Pain (7 - 10)      Allergies    No Known Allergies    Intolerances        SOCIAL HISTORY: Denies tobacco, etoh abuse or illicit drug use    FAMILY HISTORY:  Family history of lung cancer (Sibling)  Family history of stomach cancer (Sibling)      Vital Signs Last 24 Hrs  T(C): 36.4 (2018 11:19), Max: 36.9 (2018 19:28)  T(F): 97.5 (2018 11:19), Max: 98.5 (2018 19:28)  HR: 86 (2018 11:19) (86 - 98)  BP: 110/90 (2018 11:19) (110/90 - 154/99)  BP(mean): --  RR: 18 (2018 11:19) (16 - 19)  SpO2: 97% (2018 11:19) (92% - 97%)    REVIEW OF SYSTEMS:    CONSTITUTIONAL:  As per HPI.  HEENT:  Eyes:  No diplopia or blurred vision. ENT:  No earache, sore throat or runny nose.  CARDIOVASCULAR:  No pressure, squeezing, tightness, heaviness or aching about the chest, neck, axilla or epigastrium.  RESPIRATORY:  see above.  GASTROINTESTINAL:  No nausea, vomiting or diarrhea.  GENITOURINARY:  No dysuria, frequency or urgency.  MUSCULOSKELETAL:  As per HPI.  SKIN:  No change in skin, hair or nails.  NEUROLOGIC:  No paresthesias, fasciculations, seizures or weakness.  PSYCHIATRIC:  No disorder of thought or mood.  ENDOCRINE:  No heat or cold intolerance, polyuria or polydipsia.  HEMATOLOGICAL:  No easy bruising or bleedings:  .     PHYSICAL EXAMINATION:    GENERAL APPEARANCE:  Pt. is not currently dyspneic, in no distress. Pt. is alert, oriented, and pleasant.  HEENT:  Pupils are normal and react normally. No icterus. Mucous membranes well colored.  NECK:  Supple. No lymphadenopathy. Positive SC retractions.  HEART:   The cardiac impulse has a normal quality. Regular. Distant HS's, 90, reg.  CHEST:  Decreased aud BS's bilat. Pursed lip expirations.  ABDOMEN:  Soft and nontender.   EXTREMITIES:  There is no cyanosis, clubbing or edema.   SKIN:  No rash or significant lesions are noted.  Neuro: Alert, awake, and O x 3.      LABS:                        12.2   7.9   )-----------( 103      ( 2018 05:57 )             37.1     -    141  |  107  |  23  ----------------------------<  137<H>  3.7   |  25  |  0.61    Ca    8.2<L>      2018 05:57  Mg     1.8         TPro  7.9  /  Alb  3.5  /  TBili  0.8  /  DBili  x   /  AST  28  /  ALT  22  /  AlkPhos  106      LIVER FUNCTIONS - ( 2018 14:02 )  Alb: 3.5 g/dL / Pro: 7.9 gm/dL / ALK PHOS: 106 U/L / ALT: 22 U/L / AST: 28 U/L / GGT: x             CARDIAC MARKERS ( 2018 14:02 )  <0.015 ng/mL / x     / x     / x     / x          Urinalysis Basic - ( 2018 15:05 )    Color: Yellow / Appearance: very cloudy / S.020 / pH: x  Gluc: x / Ketone: Moderate  / Bili: Negative / Urobili: Negative mg/dL   Blood: x / Protein: 500 mg/dL / Nitrite: Negative   Leuk Esterase: Small / RBC: >50 /HPF / WBC 6-10   Sq Epi: x / Non Sq Epi: Few / Bacteria: Moderate          RADIOLOGY & ADDITIONAL STUDIES:
HPI:  85F w/ PMH hypothyroidism, psychotic depression, mild dementia, BIB dtr for c/o b/l hip pain after fall yesterday.  Pt is a good historian and dtr w/ whom pt lives assists w/ hx.  Pt remembers falling while in her bedroom, she thinks she slipped and fell backward incurring hip pain.  Dtr heard thud, came in and found pt on floor, awake and c/o pain.  She did not want to go to hospital initially, but did c/o ongoing hip pain so she was brought in for evaluation.  Pt's hip pain is constant, 9/10, worse w/ movement, nonradiating.  Dtr also states she's noticed pt w/ some slurred speech and lately pt has been more unsteady and having to urinate frequently.  Pt endorses polyuria, dysuria, urgency, and 1 episode of hematuria.  Denies any fever/chills, n/v, abd pain.  Denies frequent falls, normally uses walker to ambulate.   Pt is notably sob w/ conversation, dtr states pt has to stop to rest every 5-10 steps, noted quite awhile.  Dtr noticed pt is always this way although has not been previously worked up as pt does not have PMD.  Pt does not c/o chest pain.  Does note occ wheeze with exertion.  Occas slight cough.   She is former smoker, quit 10 years ago, smoked 1.5ppd x 60yrs (90pyh).  CT scan shows severe emphysematous findings, trace R effusion, some peripheral mild scarring/subseg atelectasis.    In ED, stroke code called, pt evaluated by neuro and determined symptoms not likely d/t stroke.  CT head and CTA are negative, MRI/MRA recommended.  UA +, WBC 12.5, .     :  no distress, not SOB.   :  no cough, not in distress, awake.  :  no distress, awake.    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 1000 milliGRAM(s) IV Push every 24 hours  docusate sodium 100 milliGRAM(s) Oral three times a day  heparin  Injectable 5000 Unit(s) SubCutaneous every 12 hours  influenza   Vaccine 0.5 milliLiter(s) IntraMuscular once  levothyroxine 100 MICROGram(s) Oral daily  methylPREDNISolone sodium succinate Injectable 40 milliGRAM(s) IV Push two times a day  mirtazapine 30 milliGRAM(s) Oral at bedtime  OLANZapine 5 milliGRAM(s) Oral at bedtime    MEDICATIONS  (PRN):  acetaminophen   Tablet. 650 milliGRAM(s) Oral every 6 hours PRN Mild Pain (1 - 3)  ondansetron Injectable 4 milliGRAM(s) IV Push every 6 hours PRN Nausea  traMADol 25 milliGRAM(s) Oral every 6 hours PRN Moderate Pain (4 - 6)  traMADol 50 milliGRAM(s) Oral every 6 hours PRN Severe Pain (7 - 10)      Allergies    No Known Allergies    Intolerances        SOCIAL HISTORY: Denies tobacco, etoh abuse or illicit drug use    FAMILY HISTORY:  Family history of lung cancer (Sibling)  Family history of stomach cancer (Sibling)      Vital Signs Last 24 Hrs  T(C): 36.4 (2018 11:19), Max: 36.9 (2018 19:28)  T(F): 97.5 (2018 11:19), Max: 98.5 (2018 19:28)  HR: 86 (2018 11:19) (86 - 98)  BP: 110/90 (2018 11:19) (110/90 - 154/99)  BP(mean): --  RR: 18 (2018 11:19) (16 - 19)  SpO2: 97% (2018 11:19) (92% - 97%)    REVIEW OF SYSTEMS:    CONSTITUTIONAL:  As per HPI.  HEENT:  Eyes:  No diplopia or blurred vision. ENT:  No earache, sore throat or runny nose.  CARDIOVASCULAR:  No pressure, squeezing, tightness, heaviness or aching about the chest, neck, axilla or epigastrium.  RESPIRATORY:  see above.  GASTROINTESTINAL:  No nausea, vomiting or diarrhea.  GENITOURINARY:  No dysuria, frequency or urgency.  MUSCULOSKELETAL:  As per HPI.  SKIN:  No change in skin, hair or nails.  NEUROLOGIC:  No paresthesias, fasciculations, seizures or weakness.  PSYCHIATRIC:  No disorder of thought or mood.  ENDOCRINE:  No heat or cold intolerance, polyuria or polydipsia.  HEMATOLOGICAL:  No easy bruising or bleedings:  .     PHYSICAL EXAMINATION:    GENERAL APPEARANCE:  Pt. is not currently dyspneic, in no distress. Pt. is alert, oriented, and pleasant.  HEENT:  Pupils are normal and react normally. No icterus. Mucous membranes well colored.  NECK:  Supple. No lymphadenopathy. Positive SC retractions.  HEART:   The cardiac impulse has a normal quality. Regular. Distant HS's, 90, reg.  CHEST:  Decreased aud BS's bilat. Pursed lip expirations.  ABDOMEN:  Soft and nontender.   EXTREMITIES:  There is no cyanosis, clubbing or edema.   SKIN:  No rash or significant lesions are noted.  Neuro: Alert, awake, and O x 3.      LABS:                        12.2   7.9   )-----------( 103      ( 2018 05:57 )             37.1         141  |  107  |  23  ----------------------------<  137<H>  3.7   |  25  |  0.61    Ca    8.2<L>      2018 05:57  Mg     1.8         TPro  7.9  /  Alb  3.5  /  TBili  0.8  /  DBili  x   /  AST  28  /  ALT  22  /  AlkPhos  106      LIVER FUNCTIONS - ( 2018 14:02 )  Alb: 3.5 g/dL / Pro: 7.9 gm/dL / ALK PHOS: 106 U/L / ALT: 22 U/L / AST: 28 U/L / GGT: x             CARDIAC MARKERS ( 2018 14:02 )  <0.015 ng/mL / x     / x     / x     / x          Urinalysis Basic - ( 2018 15:05 )    Color: Yellow / Appearance: very cloudy / S.020 / pH: x  Gluc: x / Ketone: Moderate  / Bili: Negative / Urobili: Negative mg/dL   Blood: x / Protein: 500 mg/dL / Nitrite: Negative   Leuk Esterase: Small / RBC: >50 /HPF / WBC 6-10   Sq Epi: x / Non Sq Epi: Few / Bacteria: Moderate          RADIOLOGY & ADDITIONAL STUDIES:
HPI:  85F w/ PMH hypothyroidism, psychotic depression, mild dementia, BIB dtr for c/o b/l hip pain after fall yesterday.  Pt is a good historian and dtr w/ whom pt lives assists w/ hx.  Pt remembers falling while in her bedroom, she thinks she slipped and fell backward incurring hip pain.  Dtr heard thud, came in and found pt on floor, awake and c/o pain.  She did not want to go to hospital initially, but did c/o ongoing hip pain so she was brought in for evaluation.  Pt's hip pain is constant, 9/10, worse w/ movement, nonradiating.  Dtr also states she's noticed pt w/ some slurred speech and lately pt has been more unsteady and having to urinate frequently.  Pt endorses polyuria, dysuria, urgency, and 1 episode of hematuria.  Denies any fever/chills, n/v, abd pain.  Denies frequent falls, normally uses walker to ambulate.   Pt is notably sob w/ conversation, dtr states pt has to stop to rest every 5-10 steps, noted quite awhile.  Dtr noticed pt is always this way although has not been previously worked up as pt does not have PMD.  Pt does not c/o chest pain.  Does note occ wheeze with exertion.  Occas slight cough.   She is former smoker, quit 10 years ago, smoked 1.5ppd x 60yrs (90pyh).  CT scan shows severe emphysematous findings, trace R effusion, some peripheral mild scarring/subseg atelectasis.    In ED, stroke code called, pt evaluated by neuro and determined symptoms not likely d/t stroke.  CT head and CTA are negative, MRI/MRA recommended.  UA +, WBC 12.5, .     :  no distress, not SOB.   :  no cough, not in distress, awake.  :  no distress, awake.  : awake, no distress, comfortable.    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 1000 milliGRAM(s) IV Push every 24 hours  docusate sodium 100 milliGRAM(s) Oral three times a day  heparin  Injectable 5000 Unit(s) SubCutaneous every 12 hours  influenza   Vaccine 0.5 milliLiter(s) IntraMuscular once  levothyroxine 100 MICROGram(s) Oral daily  methylPREDNISolone sodium succinate Injectable 40 milliGRAM(s) IV Push two times a day  mirtazapine 30 milliGRAM(s) Oral at bedtime  OLANZapine 5 milliGRAM(s) Oral at bedtime    MEDICATIONS  (PRN):  acetaminophen   Tablet. 650 milliGRAM(s) Oral every 6 hours PRN Mild Pain (1 - 3)  ondansetron Injectable 4 milliGRAM(s) IV Push every 6 hours PRN Nausea  traMADol 25 milliGRAM(s) Oral every 6 hours PRN Moderate Pain (4 - 6)  traMADol 50 milliGRAM(s) Oral every 6 hours PRN Severe Pain (7 - 10)      Allergies    No Known Allergies    Intolerances        SOCIAL HISTORY: Denies tobacco, etoh abuse or illicit drug use    FAMILY HISTORY:  Family history of lung cancer (Sibling)  Family history of stomach cancer (Sibling)      Vital Signs Last 24 Hrs  T(C): 36.4 (2018 11:19), Max: 36.9 (2018 19:28)  T(F): 97.5 (2018 11:19), Max: 98.5 (2018 19:28)  HR: 86 (2018 11:19) (86 - 98)  BP: 110/90 (2018 11:19) (110/90 - 154/99)  BP(mean): --  RR: 18 (2018 11:19) (16 - 19)  SpO2: 97% (2018 11:19) (92% - 97%)    REVIEW OF SYSTEMS:    CONSTITUTIONAL:  As per HPI.  HEENT:  Eyes:  No diplopia or blurred vision. ENT:  No earache, sore throat or runny nose.  CARDIOVASCULAR:  No pressure, squeezing, tightness, heaviness or aching about the chest, neck, axilla or epigastrium.  RESPIRATORY:  see above.  GASTROINTESTINAL:  No nausea, vomiting or diarrhea.  GENITOURINARY:  No dysuria, frequency or urgency.  MUSCULOSKELETAL:  As per HPI.  SKIN:  No change in skin, hair or nails.  NEUROLOGIC:  No paresthesias, fasciculations, seizures or weakness.  PSYCHIATRIC:  No disorder of thought or mood.  ENDOCRINE:  No heat or cold intolerance, polyuria or polydipsia.  HEMATOLOGICAL:  No easy bruising or bleedings:  .     PHYSICAL EXAMINATION:    GENERAL APPEARANCE:  Pt. is not currently dyspneic, in no distress. Pt. is alert, oriented, and pleasant.  HEENT:  Pupils are normal and react normally. No icterus. Mucous membranes well colored.  NECK:  Supple. No lymphadenopathy. Positive SC retractions.  HEART:   The cardiac impulse has a normal quality. Regular. Distant HS's, 90, reg.  CHEST:  Decreased aud BS's bilat.  ABDOMEN:  Soft and nontender.   EXTREMITIES:  There is no cyanosis, clubbing or edema.   SKIN:  No rash or significant lesions are noted.  Neuro: Alert, awake, and O x 3.      LABS:                        12.2   7.9   )-----------( 103      ( 2018 05:57 )             37.1     02-    141  |  107  |  23  ----------------------------<  137<H>  3.7   |  25  |  0.61    Ca    8.2<L>      2018 05:57  Mg     1.8     02-    TPro  7.9  /  Alb  3.5  /  TBili  0.8  /  DBili  x   /  AST  28  /  ALT  22  /  AlkPhos  106  02-    LIVER FUNCTIONS - ( 2018 14:02 )  Alb: 3.5 g/dL / Pro: 7.9 gm/dL / ALK PHOS: 106 U/L / ALT: 22 U/L / AST: 28 U/L / GGT: x             CARDIAC MARKERS ( 2018 14:02 )  <0.015 ng/mL / x     / x     / x     / x          Urinalysis Basic - ( 2018 15:05 )    Color: Yellow / Appearance: very cloudy / S.020 / pH: x  Gluc: x / Ketone: Moderate  / Bili: Negative / Urobili: Negative mg/dL   Blood: x / Protein: 500 mg/dL / Nitrite: Negative   Leuk Esterase: Small / RBC: >50 /HPF / WBC 6-10   Sq Epi: x / Non Sq Epi: Few / Bacteria: Moderate          RADIOLOGY & ADDITIONAL STUDIES:

## 2018-02-26 NOTE — DISCHARGE NOTE ADULT - PROVIDER TOKENS
TOKEN:'[id number]',FREE:[LAST:[Please establish care with a PCP in the community after discharge.],PHONE:[(   )    -],FAX:[(   )    -],ADDRESS:[after discharge from rehab.]] TOKEN:'[id number]',FREE:[LAST:[Please establish care with a PCP in the community after discharge.],PHONE:[(   )    -],FAX:[(   )    -],ADDRESS:[after discharge from rehab.]],TOKEN:'[id number]'

## 2018-02-26 NOTE — DISCHARGE NOTE ADULT - CARE PROVIDER_API CALL
Pritesh Berger), Internal Medicine; Pulmonary Disease  [address] [place] [address]  Phone: [phone]  Fax: [phone]    Please establish care with a PCP in the community after discharge.,   after discharge from rehab.  Phone: (   )    -  Fax: (   )    - Pritesh Berger), Internal Medicine; Pulmonary Disease  [address] [place] [address]  Phone: [phone]  Fax: [phone]    Please establish care with a PCP in the community after discharge.,   after discharge from rehab.  Phone: (   )    -  Fax: (   )    -    Marcelo Fountain), Orthopaedic Surgery  [address] [place] [address]  Phone: [phone]  Fax: [phone]

## 2018-02-26 NOTE — DISCHARGE NOTE ADULT - SECONDARY DIAGNOSIS.
Dementia without behavioral disturbance, unspecified dementia type Lacunar infarction Respiratory failure with hypoxia, unspecified chronicity Falls frequently
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
## 2018-03-02 DIAGNOSIS — E86.0 DEHYDRATION: ICD-10-CM

## 2018-03-02 DIAGNOSIS — M19.022 PRIMARY OSTEOARTHRITIS, LEFT ELBOW: ICD-10-CM

## 2018-03-02 DIAGNOSIS — F05 DELIRIUM DUE TO KNOWN PHYSIOLOGICAL CONDITION: ICD-10-CM

## 2018-03-02 DIAGNOSIS — M25.552 PAIN IN LEFT HIP: ICD-10-CM
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
## 2018-03-02 DIAGNOSIS — R47.1 DYSARTHRIA AND ANARTHRIA: ICD-10-CM

## 2018-03-02 DIAGNOSIS — R41.0 DISORIENTATION, UNSPECIFIED: ICD-10-CM

## 2018-03-02 DIAGNOSIS — G93.41 METABOLIC ENCEPHALOPATHY: ICD-10-CM

## 2018-03-02 DIAGNOSIS — J96.01 ACUTE RESPIRATORY FAILURE WITH HYPOXIA: ICD-10-CM

## 2018-03-02 DIAGNOSIS — F03.90 UNSPECIFIED DEMENTIA WITHOUT BEHAVIORAL DISTURBANCE: ICD-10-CM

## 2018-03-02 DIAGNOSIS — Q72.811 CONGENITAL SHORTENING OF RIGHT LOWER LIMB: ICD-10-CM

## 2018-03-02 DIAGNOSIS — N30.01 ACUTE CYSTITIS WITH HEMATURIA: ICD-10-CM

## 2018-03-02 DIAGNOSIS — J98.11 ATELECTASIS: ICD-10-CM

## 2018-03-02 DIAGNOSIS — I27.20 PULMONARY HYPERTENSION, UNSPECIFIED: ICD-10-CM

## 2018-03-02 DIAGNOSIS — Z86.73 PERSONAL HISTORY OF TRANSIENT ISCHEMIC ATTACK (TIA), AND CEREBRAL INFARCTION WITHOUT RESIDUAL DEFICITS: ICD-10-CM

## 2018-03-02 DIAGNOSIS — M25.551 PAIN IN RIGHT HIP: ICD-10-CM

## 2018-03-02 DIAGNOSIS — F32.5 MAJOR DEPRESSIVE DISORDER, SINGLE EPISODE, IN FULL REMISSION: ICD-10-CM

## 2018-03-02 DIAGNOSIS — Z87.891 PERSONAL HISTORY OF NICOTINE DEPENDENCE: ICD-10-CM

## 2018-03-02 DIAGNOSIS — M41.9 SCOLIOSIS, UNSPECIFIED: ICD-10-CM

## 2018-03-02 DIAGNOSIS — E03.9 HYPOTHYROIDISM, UNSPECIFIED: ICD-10-CM

## 2018-03-02 DIAGNOSIS — B96.4 PROTEUS (MIRABILIS) (MORGANII) AS THE CAUSE OF DISEASES CLASSIFIED ELSEWHERE: ICD-10-CM

## 2018-03-02 DIAGNOSIS — M19.021 PRIMARY OSTEOARTHRITIS, RIGHT ELBOW: ICD-10-CM

## 2018-03-02 DIAGNOSIS — J44.9 CHRONIC OBSTRUCTIVE PULMONARY DISEASE, UNSPECIFIED: ICD-10-CM

## 2018-03-16 PROBLEM — Q72.811: Chronic | Status: ACTIVE | Noted: 2018-02-21

## 2018-03-21 PROBLEM — Z00.00 ENCOUNTER FOR PREVENTIVE HEALTH EXAMINATION: Status: ACTIVE | Noted: 2018-03-21

## 2018-03-22 ENCOUNTER — INPATIENT (INPATIENT)
Facility: HOSPITAL | Age: 83
LOS: 7 days | Discharge: NURSING, REHAB OR LONG-TERM CARE | End: 2018-03-30
Attending: INTERNAL MEDICINE | Admitting: INTERNAL MEDICINE
Payer: MEDICARE

## 2018-03-22 VITALS
RESPIRATION RATE: 20 BRPM | SYSTOLIC BLOOD PRESSURE: 154 MMHG | DIASTOLIC BLOOD PRESSURE: 67 MMHG | HEART RATE: 101 BPM | WEIGHT: 119.05 LBS | HEIGHT: 64 IN | OXYGEN SATURATION: 92 %

## 2018-03-22 LAB
ABO RH CONFIRMATION: SIGNIFICANT CHANGE UP
ALBUMIN SERPL ELPH-MCNC: 2.3 G/DL — LOW (ref 3.3–5)
ALP SERPL-CCNC: 137 U/L — HIGH (ref 40–120)
ALT FLD-CCNC: 21 U/L — SIGNIFICANT CHANGE UP (ref 12–78)
ANION GAP SERPL CALC-SCNC: 7 MMOL/L — SIGNIFICANT CHANGE UP (ref 5–17)
APPEARANCE UR: CLEAR — SIGNIFICANT CHANGE UP
APTT BLD: 26.8 SEC — LOW (ref 27.5–37.4)
AST SERPL-CCNC: 18 U/L — SIGNIFICANT CHANGE UP (ref 15–37)
BACTERIA # UR AUTO: (no result)
BASOPHILS # BLD AUTO: 0.03 K/UL — SIGNIFICANT CHANGE UP (ref 0–0.2)
BASOPHILS NFR BLD AUTO: 0.2 % — SIGNIFICANT CHANGE UP (ref 0–2)
BILIRUB SERPL-MCNC: 0.5 MG/DL — SIGNIFICANT CHANGE UP (ref 0.2–1.2)
BILIRUB UR-MCNC: NEGATIVE — SIGNIFICANT CHANGE UP
BLD GP AB SCN SERPL QL: SIGNIFICANT CHANGE UP
BUN SERPL-MCNC: 22 MG/DL — SIGNIFICANT CHANGE UP (ref 7–23)
CALCIUM SERPL-MCNC: 8.7 MG/DL — SIGNIFICANT CHANGE UP (ref 8.5–10.1)
CHLORIDE SERPL-SCNC: 101 MMOL/L — SIGNIFICANT CHANGE UP (ref 96–108)
CO2 SERPL-SCNC: 29 MMOL/L — SIGNIFICANT CHANGE UP (ref 22–31)
COLOR SPEC: YELLOW — SIGNIFICANT CHANGE UP
COMMENT - URINE: SIGNIFICANT CHANGE UP
CREAT SERPL-MCNC: 0.62 MG/DL — SIGNIFICANT CHANGE UP (ref 0.5–1.3)
DIFF PNL FLD: (no result)
EOSINOPHIL # BLD AUTO: 0.03 K/UL — SIGNIFICANT CHANGE UP (ref 0–0.5)
EOSINOPHIL NFR BLD AUTO: 0.2 % — SIGNIFICANT CHANGE UP (ref 0–6)
EPI CELLS # UR: SIGNIFICANT CHANGE UP
GLUCOSE SERPL-MCNC: 112 MG/DL — HIGH (ref 70–99)
GLUCOSE UR QL: NEGATIVE MG/DL — SIGNIFICANT CHANGE UP
HCT VFR BLD CALC: 35.9 % — SIGNIFICANT CHANGE UP (ref 34.5–45)
HGB BLD-MCNC: 11.4 G/DL — LOW (ref 11.5–15.5)
IMM GRANULOCYTES NFR BLD AUTO: 0.7 % — SIGNIFICANT CHANGE UP (ref 0–1.5)
INR BLD: 1.24 RATIO — HIGH (ref 0.88–1.16)
KETONES UR-MCNC: (no result)
LEUKOCYTE ESTERASE UR-ACNC: (no result)
LYMPHOCYTES # BLD AUTO: 0.69 K/UL — LOW (ref 1–3.3)
LYMPHOCYTES # BLD AUTO: 5.1 % — LOW (ref 13–44)
MCHC RBC-ENTMCNC: 27.6 PG — SIGNIFICANT CHANGE UP (ref 27–34)
MCHC RBC-ENTMCNC: 31.8 GM/DL — LOW (ref 32–36)
MCV RBC AUTO: 86.9 FL — SIGNIFICANT CHANGE UP (ref 80–100)
MONOCYTES # BLD AUTO: 0.81 K/UL — SIGNIFICANT CHANGE UP (ref 0–0.9)
MONOCYTES NFR BLD AUTO: 5.9 % — SIGNIFICANT CHANGE UP (ref 2–14)
NEUTROPHILS # BLD AUTO: 11.99 K/UL — HIGH (ref 1.8–7.4)
NEUTROPHILS NFR BLD AUTO: 87.9 % — HIGH (ref 43–77)
NITRITE UR-MCNC: NEGATIVE — SIGNIFICANT CHANGE UP
NRBC # BLD: 0 /100 WBCS — SIGNIFICANT CHANGE UP (ref 0–0)
PH UR: 5 — SIGNIFICANT CHANGE UP (ref 5–8)
PLATELET # BLD AUTO: 293 K/UL — SIGNIFICANT CHANGE UP (ref 150–400)
POTASSIUM SERPL-MCNC: 4.8 MMOL/L — SIGNIFICANT CHANGE UP (ref 3.5–5.3)
POTASSIUM SERPL-SCNC: 4.8 MMOL/L — SIGNIFICANT CHANGE UP (ref 3.5–5.3)
PROT SERPL-MCNC: 7.4 GM/DL — SIGNIFICANT CHANGE UP (ref 6–8.3)
PROT UR-MCNC: 30 MG/DL
PROTHROM AB SERPL-ACNC: 13.5 SEC — HIGH (ref 9.8–12.7)
RBC # BLD: 4.13 M/UL — SIGNIFICANT CHANGE UP (ref 3.8–5.2)
RBC # FLD: 13.7 % — SIGNIFICANT CHANGE UP (ref 10.3–14.5)
RBC CASTS # UR COMP ASSIST: (no result) /HPF (ref 0–4)
SODIUM SERPL-SCNC: 137 MMOL/L — SIGNIFICANT CHANGE UP (ref 135–145)
SP GR SPEC: 1.01 — SIGNIFICANT CHANGE UP (ref 1.01–1.02)
TYPE + AB SCN PNL BLD: SIGNIFICANT CHANGE UP
UROBILINOGEN FLD QL: NEGATIVE MG/DL — SIGNIFICANT CHANGE UP
WBC # BLD: 13.65 K/UL — HIGH (ref 3.8–10.5)
WBC # FLD AUTO: 13.65 K/UL — HIGH (ref 3.8–10.5)
WBC UR QL: SIGNIFICANT CHANGE UP

## 2018-03-22 PROCEDURE — 72125 CT NECK SPINE W/O DYE: CPT | Mod: 26

## 2018-03-22 PROCEDURE — 73552 X-RAY EXAM OF FEMUR 2/>: CPT | Mod: 26

## 2018-03-22 PROCEDURE — 76377 3D RENDER W/INTRP POSTPROCES: CPT | Mod: 26

## 2018-03-22 PROCEDURE — 72192 CT PELVIS W/O DYE: CPT | Mod: 26

## 2018-03-22 PROCEDURE — 71045 X-RAY EXAM CHEST 1 VIEW: CPT | Mod: 26

## 2018-03-22 PROCEDURE — 73502 X-RAY EXAM HIP UNI 2-3 VIEWS: CPT | Mod: 26

## 2018-03-22 PROCEDURE — 99285 EMERGENCY DEPT VISIT HI MDM: CPT

## 2018-03-22 PROCEDURE — 93010 ELECTROCARDIOGRAM REPORT: CPT

## 2018-03-22 PROCEDURE — 70450 CT HEAD/BRAIN W/O DYE: CPT | Mod: 26

## 2018-03-22 RX ORDER — SODIUM CHLORIDE 9 MG/ML
500 INJECTION INTRAMUSCULAR; INTRAVENOUS; SUBCUTANEOUS ONCE
Qty: 0 | Refills: 0 | Status: COMPLETED | OUTPATIENT
Start: 2018-03-22 | End: 2018-03-22

## 2018-03-22 RX ORDER — IPRATROPIUM/ALBUTEROL SULFATE 18-103MCG
3 AEROSOL WITH ADAPTER (GRAM) INHALATION ONCE
Qty: 0 | Refills: 0 | Status: COMPLETED | OUTPATIENT
Start: 2018-03-22 | End: 2018-03-22

## 2018-03-22 RX ORDER — OXYCODONE HYDROCHLORIDE 5 MG/1
5 TABLET ORAL EVERY 4 HOURS
Qty: 0 | Refills: 0 | Status: DISCONTINUED | OUTPATIENT
Start: 2018-03-22 | End: 2018-03-23

## 2018-03-22 RX ORDER — ALBUTEROL 90 UG/1
2.5 AEROSOL, METERED ORAL EVERY 6 HOURS
Qty: 0 | Refills: 0 | Status: DISCONTINUED | OUTPATIENT
Start: 2018-03-22 | End: 2018-03-23

## 2018-03-22 RX ORDER — MIRTAZAPINE 45 MG/1
30 TABLET, ORALLY DISINTEGRATING ORAL AT BEDTIME
Qty: 0 | Refills: 0 | Status: DISCONTINUED | OUTPATIENT
Start: 2018-03-22 | End: 2018-03-23

## 2018-03-22 RX ORDER — ONDANSETRON 8 MG/1
4 TABLET, FILM COATED ORAL EVERY 6 HOURS
Qty: 0 | Refills: 0 | Status: DISCONTINUED | OUTPATIENT
Start: 2018-03-22 | End: 2018-03-23

## 2018-03-22 RX ORDER — ATORVASTATIN CALCIUM 80 MG/1
40 TABLET, FILM COATED ORAL AT BEDTIME
Qty: 0 | Refills: 0 | Status: DISCONTINUED | OUTPATIENT
Start: 2018-03-22 | End: 2018-03-23

## 2018-03-22 RX ORDER — ALBUTEROL 90 UG/1
2.5 AEROSOL, METERED ORAL
Qty: 0 | Refills: 0 | Status: DISCONTINUED | OUTPATIENT
Start: 2018-03-22 | End: 2018-03-23

## 2018-03-22 RX ORDER — DOCUSATE SODIUM 100 MG
100 CAPSULE ORAL THREE TIMES A DAY
Qty: 0 | Refills: 0 | Status: DISCONTINUED | OUTPATIENT
Start: 2018-03-22 | End: 2018-03-23

## 2018-03-22 RX ORDER — ACETAMINOPHEN 500 MG
650 TABLET ORAL EVERY 6 HOURS
Qty: 0 | Refills: 0 | Status: DISCONTINUED | OUTPATIENT
Start: 2018-03-22 | End: 2018-03-23

## 2018-03-22 RX ORDER — SENNA PLUS 8.6 MG/1
2 TABLET ORAL AT BEDTIME
Qty: 0 | Refills: 0 | Status: DISCONTINUED | OUTPATIENT
Start: 2018-03-22 | End: 2018-03-23

## 2018-03-22 RX ORDER — LEVOTHYROXINE SODIUM 125 MCG
100 TABLET ORAL DAILY
Qty: 0 | Refills: 0 | Status: DISCONTINUED | OUTPATIENT
Start: 2018-03-22 | End: 2018-03-23

## 2018-03-22 RX ORDER — SODIUM CHLORIDE 9 MG/ML
3 INJECTION INTRAMUSCULAR; INTRAVENOUS; SUBCUTANEOUS ONCE
Qty: 0 | Refills: 0 | Status: COMPLETED | OUTPATIENT
Start: 2018-03-22 | End: 2018-03-22

## 2018-03-22 RX ORDER — OLANZAPINE 15 MG/1
5 TABLET, FILM COATED ORAL AT BEDTIME
Qty: 0 | Refills: 0 | Status: DISCONTINUED | OUTPATIENT
Start: 2018-03-22 | End: 2018-03-23

## 2018-03-22 RX ORDER — POTASSIUM CHLORIDE 20 MEQ
0 PACKET (EA) ORAL
Qty: 0 | Refills: 0 | COMMUNITY

## 2018-03-22 RX ORDER — MORPHINE SULFATE 50 MG/1
2 CAPSULE, EXTENDED RELEASE ORAL EVERY 4 HOURS
Qty: 0 | Refills: 0 | Status: DISCONTINUED | OUTPATIENT
Start: 2018-03-22 | End: 2018-03-23

## 2018-03-22 RX ORDER — BUDESONIDE, MICRONIZED 100 %
0.25 POWDER (GRAM) MISCELLANEOUS EVERY 12 HOURS
Qty: 0 | Refills: 0 | Status: DISCONTINUED | OUTPATIENT
Start: 2018-03-22 | End: 2018-03-23

## 2018-03-22 RX ORDER — SODIUM CHLORIDE 9 MG/ML
1000 INJECTION, SOLUTION INTRAVENOUS
Qty: 0 | Refills: 0 | Status: DISCONTINUED | OUTPATIENT
Start: 2018-03-22 | End: 2018-03-23

## 2018-03-22 RX ORDER — ASPIRIN/CALCIUM CARB/MAGNESIUM 324 MG
81 TABLET ORAL DAILY
Qty: 0 | Refills: 0 | Status: DISCONTINUED | OUTPATIENT
Start: 2018-03-22 | End: 2018-03-23

## 2018-03-22 RX ORDER — FLUTICASONE PROPIONATE 50 MCG
1 SPRAY, SUSPENSION NASAL
Qty: 0 | Refills: 0 | Status: DISCONTINUED | OUTPATIENT
Start: 2018-03-22 | End: 2018-03-23

## 2018-03-22 RX ADMIN — MIRTAZAPINE 30 MILLIGRAM(S): 45 TABLET, ORALLY DISINTEGRATING ORAL at 22:31

## 2018-03-22 RX ADMIN — SODIUM CHLORIDE 500 MILLILITER(S): 9 INJECTION INTRAMUSCULAR; INTRAVENOUS; SUBCUTANEOUS at 13:28

## 2018-03-22 RX ADMIN — Medication 0.25 MILLIGRAM(S): at 20:55

## 2018-03-22 RX ADMIN — Medication 600 MILLIGRAM(S): at 22:31

## 2018-03-22 RX ADMIN — Medication 3 MILLILITER(S): at 11:43

## 2018-03-22 RX ADMIN — OLANZAPINE 5 MILLIGRAM(S): 15 TABLET, FILM COATED ORAL at 22:31

## 2018-03-22 RX ADMIN — Medication 1 SPRAY(S): at 22:34

## 2018-03-22 RX ADMIN — SODIUM CHLORIDE 3 MILLILITER(S): 9 INJECTION INTRAMUSCULAR; INTRAVENOUS; SUBCUTANEOUS at 13:29

## 2018-03-22 RX ADMIN — ALBUTEROL 2.5 MILLIGRAM(S): 90 AEROSOL, METERED ORAL at 20:43

## 2018-03-22 RX ADMIN — ATORVASTATIN CALCIUM 40 MILLIGRAM(S): 80 TABLET, FILM COATED ORAL at 22:31

## 2018-03-22 NOTE — ED PROVIDER NOTE - ENMT, MLM
Airway patent, Nasal mucosa clear. Mouth with moderately dry mucosa. Throat has no vesicles, no oropharyngeal exudates and uvula is midline. +dentures. No clinical evidence of facial injury. (-)Battles (-)Raccoons. Neck nontender/supple

## 2018-03-22 NOTE — ED ADULT NURSE NOTE - CHIEF COMPLAINT QUOTE
patient presents in ED s/p unwitnessed fall last night around 3am at home. patient denies hitting her head. no obvious injuries. patient complains of left leg pain. GCS 14. patient recently admitted in [facility] for fall and uti and discharged to [facility]. Patient's daughter states she has fallen 3 times in the last 1 month.

## 2018-03-22 NOTE — CONSULT NOTE ADULT - ASSESSMENT
A/P: 85y Female with L hip fracture  Plan for OR  NPO  Pain control  NWB R/L LE, bedrest  FU labs/imaging  Ca/Vit D  Outpt osteoporosis workup  Admit to medical team  Medical clearance/optimization for OR  Will discuss with attending A/P: 85y Female with L hip fracture  Plan for OR  NPO  IVF while NPO   hold all AC  Pain control  NWB L LE, bedrest  FU labs/imaging  Admit to medical team  Medical clearance/optimization for OR  Will discuss with attending

## 2018-03-22 NOTE — H&P ADULT - NSHPPHYSICALEXAM_GEN_ALL_CORE
Vital Signs Last 24 Hrs  T(C): --  T(F): --  HR: 101 (22 Mar 2018 09:36) (101 - 101)  BP: 154/67 (22 Mar 2018 09:36) (154/67 - 154/67)  BP(mean): --  RR: 20 (22 Mar 2018 09:36) (20 - 20)  SpO2: 92% (22 Mar 2018 09:36) (92% - 92%)

## 2018-03-22 NOTE — H&P ADULT - HISTORY OF PRESENT ILLNESS
84yo/F with PMH COPD, hypothyroidism, hyperlipidemia presented s/p mechanical fall earlier today. She normally walks with the walker, she did not take one with her when she went to the bathroom and she lost her balance and fell, sustaining LT hip fracture. She denies LOC.

## 2018-03-22 NOTE — ED PROVIDER NOTE - OBJECTIVE STATEMENT
86 y/o F w/ pmhx of COPD, congenital right leg shortening, presents to ED s/p unwitnessed fall this morning around 03:00 todayat [facility] . Pt states she got up to go to the bathroom and when walking back to bedroom she fell. As per daughter, pt fell on her hip. Currently on ASA 81mg and home O2. Pt denies hitting her head, denies pain, SOB, or any other acute complaints. 86 y/o F w/ pmhx of dementia, COPD, congenital right leg shortening, presents to ED s/p unwitnessed fall this morning around 03:00 today at [facility] . Pt states she got up to go to the bathroom and when walking back to bedroom she fell. As per daughter, pt fell on her hip. C/o left leg pain. Currently on ASA 81mg and home O2. Pt denies hitting her head, SOB, or any other acute complaints. 84 y/o F w/ pmhx of dementia, COPD, congenital right leg shortening, D/C'ed from [facility] ~ 3 dd. ago, presents to ED s/p unwitnessed fall this morning around 03:00 today at home (lives w/ daughter). Pt states she got up to go to the bathroom and when walking back to bedroom she fell. As per daughter, pt fell on her hip. C/o left leg pain. Currently on ASA 81mg and home O2. Pt denies hitting her head, SOB, or any other acute complaints.

## 2018-03-22 NOTE — CONSULT NOTE ADULT - SUBJECTIVE AND OBJECTIVE BOX
85y Female presents c/o L hip pain and inability to ambulate sp unwitnessed fall. Denies HS/LOC. Denies numbness/tingling. Denies fever/chills. Denies pain/injury elsewhere. Patietn uses a walker to ambulate.    PAST MEDICAL & SURGICAL HISTORY:  Osteoarthritis of both elbows, unspecified osteoarthritis type  Psychotic depression in full remission  Hypothyroidism, unspecified type  Dementia  Shortening, leg, congenital, right  No significant past surgical history    MEDICATIONS  (STANDING):  sodium chloride 0.9% Bolus 500 milliLiter(s) IV Bolus once  sodium chloride 0.9% lock flush 3 milliLiter(s) IV Push once    Allergies    No Known Allergies    Intolerances                              11.4   13.65 )-----------( 293      ( 22 Mar 2018 12:16 )             35.9             Vital Signs Last 24 Hrs  T(C): --  T(F): --  HR: 101 (03-22-18 @ 09:36) (101 - 101)  BP: 154/67 (03-22-18 @ 09:36) (154/67 - 154/67)  BP(mean): --  RR: 20 (03-22-18 @ 09:36) (20 - 20)  SpO2: 92% (03-22-18 @ 09:36) (92% - 92%)    Imaging: XR demonstates R/L hip fracture    Physical Exam  Gen: NAD  L LE: skin intact, short/ER leg, unable to SLR, + log roll, +ttp hip/groin, no ttp elsewhere, +ehl/fhl/ta/gs function, no calf ttp, dp/pt pulse intact, compartments soft  Secondary survey: benign, nv intact, able to SLR contralateral leg, negative log roll contralateral leg, no bony ttp elsewhere

## 2018-03-22 NOTE — ED PROVIDER NOTE - CARE PLAN
Principal Discharge DX:	Closed fracture of left hip, initial encounter  Secondary Diagnosis:	Vertebral compression fracture, initial encounter  Secondary Diagnosis:	Fall, initial encounter

## 2018-03-22 NOTE — H&P ADULT - NSHPLABSRESULTS_GEN_ALL_CORE
11.4   13.65 )-----------( 293      ( 22 Mar 2018 12:16 )             35.9     22 Mar 2018 13:17    137    |  101    |  22     ----------------------------<  112    4.8     |  29     |  0.62     Ca    8.7        22 Mar 2018 13:17    TPro  7.4    /  Alb  2.3    /  TBili  0.5    /  DBili  x      /  AST  18     /  ALT  21     /  AlkPhos  137    22 Mar 2018 13:17    LIVER FUNCTIONS - ( 22 Mar 2018 13:17 )  Alb: 2.3 g/dL / Pro: 7.4 gm/dL / ALK PHOS: 137 U/L / ALT: 21 U/L / AST: 18 U/L / GGT: x         PT/INR - ( 22 Mar 2018 12:16 )   PT: 13.5 sec;   INR: 1.24 ratio    PTT - ( 22 Mar 2018 12:16 )  PTT:26.8 sec    Urinalysis Basic - ( 22 Mar 2018 13:48 )  Color: Yellow / Appearance: Clear / S.015 / pH: x  Gluc: x / Ketone: Trace  / Bili: Negative / Urobili: Negative mg/dL   Blood: x / Protein: 30 mg/dL / Nitrite: Negative   Leuk Esterase: Trace / RBC: 25-50 /HPF / WBC 3-5   Sq Epi: x / Non Sq Epi: Few / Bacteria: Occasional

## 2018-03-22 NOTE — H&P ADULT - PMH
COPD (chronic obstructive pulmonary disease)    Hypothyroidism, unspecified type    Osteoarthritis of both elbows, unspecified osteoarthritis type    Psychotic depression in full remission    Shortening, leg, congenital, right

## 2018-03-22 NOTE — ED ADULT TRIAGE NOTE - CHIEF COMPLAINT QUOTE
patient presents in ED s/p unwitnessed fall last night around 3am. patient denies hitting her head. no obvious injuries. patient complains of left leg pain. GCS 14. patient presents in ED s/p unwitnessed fall last night around 3am. patient denies hitting her head. no obvious injuries. patient complains of left leg pain. GCS 14. patient recently admitted in [facility] for fall and uti and discharged to [facility].

## 2018-03-22 NOTE — ED PROVIDER NOTE - MUSCULOSKELETAL, MLM
Pelvis nontender/stable. Mild tenderness left hip. pt unable to SLR either leg but good AROM b/l knees +associated left hip discomfort during left knee AROM. Bilateral legs distal motor/sensory intact. No gross swelling nor deformity noted of lower extremities.

## 2018-03-22 NOTE — ED PROVIDER NOTE - MEDICAL DECISION MAKING DETAILS
86 y/o F w/ hx of dementia, HH hospitalized february s/p fall, d/c early this week from rehab, but poorly ambulatory, uses walker. BIBA from home s/p fall early this AM while walking unassisted back to bed from bathroom. Pt w/ poor memory of incident. Physical exam mild TTP left hip, pain w/ AROM, no gross deformity/focal tenderness, +NVI distally. Plan for CT head/c-spine, XR, observe, reassess.

## 2018-03-22 NOTE — ED PROVIDER NOTE - PMH
COPD (chronic obstructive pulmonary disease)    Dementia    Hypothyroidism, unspecified type    Osteoarthritis of both elbows, unspecified osteoarthritis type    Psychotic depression in full remission    Shortening, leg, congenital, right

## 2018-03-23 ENCOUNTER — TRANSCRIPTION ENCOUNTER (OUTPATIENT)
Age: 83
End: 2018-03-23

## 2018-03-23 PROBLEM — F03.90 UNSPECIFIED DEMENTIA WITHOUT BEHAVIORAL DISTURBANCE: Chronic | Status: INACTIVE | Noted: 2018-02-21 | Resolved: 2018-03-22

## 2018-03-23 LAB
ANION GAP SERPL CALC-SCNC: 5 MMOL/L — SIGNIFICANT CHANGE UP (ref 5–17)
ANION GAP SERPL CALC-SCNC: 6 MMOL/L — SIGNIFICANT CHANGE UP (ref 5–17)
APTT BLD: 28.1 SEC — SIGNIFICANT CHANGE UP (ref 27.5–37.4)
BUN SERPL-MCNC: 25 MG/DL — HIGH (ref 7–23)
BUN SERPL-MCNC: 26 MG/DL — HIGH (ref 7–23)
CALCIUM SERPL-MCNC: 8.3 MG/DL — LOW (ref 8.5–10.1)
CALCIUM SERPL-MCNC: 8.4 MG/DL — LOW (ref 8.5–10.1)
CHLORIDE SERPL-SCNC: 102 MMOL/L — SIGNIFICANT CHANGE UP (ref 96–108)
CHLORIDE SERPL-SCNC: 104 MMOL/L — SIGNIFICANT CHANGE UP (ref 96–108)
CO2 SERPL-SCNC: 30 MMOL/L — SIGNIFICANT CHANGE UP (ref 22–31)
CO2 SERPL-SCNC: 30 MMOL/L — SIGNIFICANT CHANGE UP (ref 22–31)
CREAT SERPL-MCNC: 0.58 MG/DL — SIGNIFICANT CHANGE UP (ref 0.5–1.3)
CREAT SERPL-MCNC: 0.75 MG/DL — SIGNIFICANT CHANGE UP (ref 0.5–1.3)
CULTURE RESULTS: NO GROWTH — SIGNIFICANT CHANGE UP
GLUCOSE SERPL-MCNC: 113 MG/DL — HIGH (ref 70–99)
GLUCOSE SERPL-MCNC: 151 MG/DL — HIGH (ref 70–99)
HCT VFR BLD CALC: 31.7 % — LOW (ref 34.5–45)
HCT VFR BLD CALC: 34.1 % — LOW (ref 34.5–45)
HGB BLD-MCNC: 11 G/DL — LOW (ref 11.5–15.5)
HGB BLD-MCNC: 9.8 G/DL — LOW (ref 11.5–15.5)
INR BLD: 1.27 RATIO — HIGH (ref 0.88–1.16)
MCHC RBC-ENTMCNC: 27.2 PG — SIGNIFICANT CHANGE UP (ref 27–34)
MCHC RBC-ENTMCNC: 27.4 PG — SIGNIFICANT CHANGE UP (ref 27–34)
MCHC RBC-ENTMCNC: 30.9 GM/DL — LOW (ref 32–36)
MCHC RBC-ENTMCNC: 32.3 GM/DL — SIGNIFICANT CHANGE UP (ref 32–36)
MCV RBC AUTO: 85 FL — SIGNIFICANT CHANGE UP (ref 80–100)
MCV RBC AUTO: 88.1 FL — SIGNIFICANT CHANGE UP (ref 80–100)
NRBC # BLD: 0 /100 WBCS — SIGNIFICANT CHANGE UP (ref 0–0)
NRBC # BLD: 0 /100 WBCS — SIGNIFICANT CHANGE UP (ref 0–0)
PLATELET # BLD AUTO: 272 K/UL — SIGNIFICANT CHANGE UP (ref 150–400)
PLATELET # BLD AUTO: 275 K/UL — SIGNIFICANT CHANGE UP (ref 150–400)
POTASSIUM SERPL-MCNC: 4.3 MMOL/L — SIGNIFICANT CHANGE UP (ref 3.5–5.3)
POTASSIUM SERPL-MCNC: 4.4 MMOL/L — SIGNIFICANT CHANGE UP (ref 3.5–5.3)
POTASSIUM SERPL-SCNC: 4.3 MMOL/L — SIGNIFICANT CHANGE UP (ref 3.5–5.3)
POTASSIUM SERPL-SCNC: 4.4 MMOL/L — SIGNIFICANT CHANGE UP (ref 3.5–5.3)
PROTHROM AB SERPL-ACNC: 13.8 SEC — HIGH (ref 9.8–12.7)
RBC # BLD: 3.6 M/UL — LOW (ref 3.8–5.2)
RBC # BLD: 4.01 M/UL — SIGNIFICANT CHANGE UP (ref 3.8–5.2)
RBC # FLD: 13.6 % — SIGNIFICANT CHANGE UP (ref 10.3–14.5)
RBC # FLD: 13.7 % — SIGNIFICANT CHANGE UP (ref 10.3–14.5)
SODIUM SERPL-SCNC: 137 MMOL/L — SIGNIFICANT CHANGE UP (ref 135–145)
SODIUM SERPL-SCNC: 140 MMOL/L — SIGNIFICANT CHANGE UP (ref 135–145)
SPECIMEN SOURCE: SIGNIFICANT CHANGE UP
WBC # BLD: 15.59 K/UL — HIGH (ref 3.8–10.5)
WBC # BLD: 18.44 K/UL — HIGH (ref 3.8–10.5)
WBC # FLD AUTO: 15.59 K/UL — HIGH (ref 3.8–10.5)
WBC # FLD AUTO: 18.44 K/UL — HIGH (ref 3.8–10.5)

## 2018-03-23 RX ORDER — DIPHENHYDRAMINE HCL 50 MG
25 CAPSULE ORAL EVERY 6 HOURS
Qty: 0 | Refills: 0 | Status: DISCONTINUED | OUTPATIENT
Start: 2018-03-23 | End: 2018-03-30

## 2018-03-23 RX ORDER — CEFAZOLIN SODIUM 1 G
2000 VIAL (EA) INJECTION EVERY 8 HOURS
Qty: 0 | Refills: 0 | Status: COMPLETED | OUTPATIENT
Start: 2018-03-23 | End: 2018-03-24

## 2018-03-23 RX ORDER — DOCUSATE SODIUM 100 MG
100 CAPSULE ORAL THREE TIMES A DAY
Qty: 0 | Refills: 0 | Status: DISCONTINUED | OUTPATIENT
Start: 2018-03-23 | End: 2018-03-30

## 2018-03-23 RX ORDER — ASPIRIN/CALCIUM CARB/MAGNESIUM 324 MG
325 TABLET ORAL
Qty: 0 | Refills: 0 | Status: DISCONTINUED | OUTPATIENT
Start: 2018-03-24 | End: 2018-03-24

## 2018-03-23 RX ORDER — FOLIC ACID 0.8 MG
1 TABLET ORAL DAILY
Qty: 0 | Refills: 0 | Status: DISCONTINUED | OUTPATIENT
Start: 2018-03-23 | End: 2018-03-30

## 2018-03-23 RX ORDER — LEVOTHYROXINE SODIUM 125 MCG
100 TABLET ORAL DAILY
Qty: 0 | Refills: 0 | Status: DISCONTINUED | OUTPATIENT
Start: 2018-03-23 | End: 2018-03-30

## 2018-03-23 RX ORDER — ATORVASTATIN CALCIUM 80 MG/1
40 TABLET, FILM COATED ORAL AT BEDTIME
Qty: 0 | Refills: 0 | Status: DISCONTINUED | OUTPATIENT
Start: 2018-03-23 | End: 2018-03-30

## 2018-03-23 RX ORDER — ASCORBIC ACID 60 MG
500 TABLET,CHEWABLE ORAL
Qty: 0 | Refills: 0 | Status: DISCONTINUED | OUTPATIENT
Start: 2018-03-23 | End: 2018-03-30

## 2018-03-23 RX ORDER — BENZOCAINE AND MENTHOL 5; 1 G/100ML; G/100ML
1 LIQUID ORAL
Qty: 0 | Refills: 0 | Status: DISCONTINUED | OUTPATIENT
Start: 2018-03-23 | End: 2018-03-30

## 2018-03-23 RX ORDER — BUDESONIDE, MICRONIZED 100 %
0.25 POWDER (GRAM) MISCELLANEOUS EVERY 12 HOURS
Qty: 0 | Refills: 0 | Status: DISCONTINUED | OUTPATIENT
Start: 2018-03-23 | End: 2018-03-30

## 2018-03-23 RX ORDER — SODIUM CHLORIDE 9 MG/ML
1000 INJECTION, SOLUTION INTRAVENOUS
Qty: 0 | Refills: 0 | Status: DISCONTINUED | OUTPATIENT
Start: 2018-03-23 | End: 2018-03-26

## 2018-03-23 RX ORDER — SODIUM CHLORIDE 9 MG/ML
1000 INJECTION, SOLUTION INTRAVENOUS
Qty: 0 | Refills: 0 | Status: DISCONTINUED | OUTPATIENT
Start: 2018-03-23 | End: 2018-03-24

## 2018-03-23 RX ORDER — ACETAMINOPHEN 500 MG
1000 TABLET ORAL ONCE
Qty: 0 | Refills: 0 | Status: COMPLETED | OUTPATIENT
Start: 2018-03-23 | End: 2018-03-23

## 2018-03-23 RX ORDER — ZALEPLON 10 MG
5 CAPSULE ORAL AT BEDTIME
Qty: 0 | Refills: 0 | Status: DISCONTINUED | OUTPATIENT
Start: 2018-03-23 | End: 2018-03-23

## 2018-03-23 RX ORDER — ALBUTEROL 90 UG/1
2.5 AEROSOL, METERED ORAL EVERY 6 HOURS
Qty: 0 | Refills: 0 | Status: DISCONTINUED | OUTPATIENT
Start: 2018-03-23 | End: 2018-03-25

## 2018-03-23 RX ORDER — ACETAMINOPHEN 500 MG
650 TABLET ORAL EVERY 6 HOURS
Qty: 0 | Refills: 0 | Status: DISCONTINUED | OUTPATIENT
Start: 2018-03-23 | End: 2018-03-30

## 2018-03-23 RX ORDER — FERROUS SULFATE 325(65) MG
325 TABLET ORAL
Qty: 0 | Refills: 0 | Status: DISCONTINUED | OUTPATIENT
Start: 2018-03-23 | End: 2018-03-30

## 2018-03-23 RX ORDER — MIRTAZAPINE 45 MG/1
30 TABLET, ORALLY DISINTEGRATING ORAL AT BEDTIME
Qty: 0 | Refills: 0 | Status: DISCONTINUED | OUTPATIENT
Start: 2018-03-23 | End: 2018-03-30

## 2018-03-23 RX ORDER — SODIUM CHLORIDE 9 MG/ML
500 INJECTION, SOLUTION INTRAVENOUS ONCE
Qty: 0 | Refills: 0 | Status: COMPLETED | OUTPATIENT
Start: 2018-03-23 | End: 2018-03-23

## 2018-03-23 RX ORDER — ONDANSETRON 8 MG/1
4 TABLET, FILM COATED ORAL ONCE
Qty: 0 | Refills: 0 | Status: DISCONTINUED | OUTPATIENT
Start: 2018-03-23 | End: 2018-03-23

## 2018-03-23 RX ORDER — OLANZAPINE 15 MG/1
5 TABLET, FILM COATED ORAL AT BEDTIME
Qty: 0 | Refills: 0 | Status: DISCONTINUED | OUTPATIENT
Start: 2018-03-23 | End: 2018-03-30

## 2018-03-23 RX ORDER — ONDANSETRON 8 MG/1
4 TABLET, FILM COATED ORAL EVERY 6 HOURS
Qty: 0 | Refills: 0 | Status: DISCONTINUED | OUTPATIENT
Start: 2018-03-23 | End: 2018-03-30

## 2018-03-23 RX ORDER — ALBUTEROL 90 UG/1
1 AEROSOL, METERED ORAL EVERY 4 HOURS
Qty: 0 | Refills: 0 | Status: DISCONTINUED | OUTPATIENT
Start: 2018-03-23 | End: 2018-03-25

## 2018-03-23 RX ORDER — FENTANYL CITRATE 50 UG/ML
25 INJECTION INTRAVENOUS
Qty: 0 | Refills: 0 | Status: DISCONTINUED | OUTPATIENT
Start: 2018-03-23 | End: 2018-03-23

## 2018-03-23 RX ADMIN — Medication 400 MILLIGRAM(S): at 17:36

## 2018-03-23 RX ADMIN — MIRTAZAPINE 30 MILLIGRAM(S): 45 TABLET, ORALLY DISINTEGRATING ORAL at 21:28

## 2018-03-23 RX ADMIN — Medication 0.25 MILLIGRAM(S): at 20:16

## 2018-03-23 RX ADMIN — Medication 0.25 MILLIGRAM(S): at 08:00

## 2018-03-23 RX ADMIN — Medication 600 MILLIGRAM(S): at 21:28

## 2018-03-23 RX ADMIN — Medication 100 MICROGRAM(S): at 05:17

## 2018-03-23 RX ADMIN — ALBUTEROL 2.5 MILLIGRAM(S): 90 AEROSOL, METERED ORAL at 02:10

## 2018-03-23 RX ADMIN — SODIUM CHLORIDE 1000 MILLILITER(S): 9 INJECTION, SOLUTION INTRAVENOUS at 17:15

## 2018-03-23 RX ADMIN — OLANZAPINE 5 MILLIGRAM(S): 15 TABLET, FILM COATED ORAL at 21:28

## 2018-03-23 RX ADMIN — ATORVASTATIN CALCIUM 40 MILLIGRAM(S): 80 TABLET, FILM COATED ORAL at 21:28

## 2018-03-23 RX ADMIN — ALBUTEROL 2.5 MILLIGRAM(S): 90 AEROSOL, METERED ORAL at 17:39

## 2018-03-23 RX ADMIN — SODIUM CHLORIDE 75 MILLILITER(S): 9 INJECTION, SOLUTION INTRAVENOUS at 18:32

## 2018-03-23 RX ADMIN — ALBUTEROL 2.5 MILLIGRAM(S): 90 AEROSOL, METERED ORAL at 13:36

## 2018-03-23 RX ADMIN — ALBUTEROL 2.5 MILLIGRAM(S): 90 AEROSOL, METERED ORAL at 08:00

## 2018-03-23 NOTE — CONSULT NOTE ADULT - ASSESSMENT
A/P 84 YO F w/ L intertrochanteric hip fracture and L4 vertebral compression fracture s/p fall  Pain control  Plan for Surgery for left hip fracture w/ Dr. Mireles  L4 vertebral compression fracture: No spinal anesthesia  Okay to use Aspiriin for DVT prophylaxis for first 4 days post op, afterwords no restrictions to the use of lovenox/heparin or other anticoagulants  WBAT after procedure  If back pain severe after surgery may need LSO brace for comfort

## 2018-03-23 NOTE — PROGRESS NOTE ADULT - SUBJECTIVE AND OBJECTIVE BOX
Patient seen and examined. Pain controlled. No acute events overnight    Vital Signs Last 24 Hrs  T(C): 36.5 (03-23-18 @ 03:16), Max: 36.5 (03-23-18 @ 03:16)  T(F): 97.7 (03-23-18 @ 03:16), Max: 97.7 (03-23-18 @ 03:16)  HR: 106 (03-23-18 @ 03:16) (101 - 115)  BP: 131/75 (03-23-18 @ 03:16) (131/75 - 154/67)  BP(mean): --  RR: 20 (03-23-18 @ 03:16) (20 - 20)  SpO2: 92% (03-23-18 @ 03:16) (92% - 95%)    Physical Exam  Gen: NAD  LLE:   Dressing c/d/i  +EHL/FHL/Gsc/TA  SILT L3-S1  DP +  Compartments soft  No calf ttp    A/P: 85y Female with left IT fx   plan for OR today  NPO  IVF while NPO  hodl all AC   Pain control  DVT ppx  PT/OT, NWB LLE, bedrest  FU labs  Dispo planning  D/w attending

## 2018-03-23 NOTE — CONSULT NOTE ADULT - SUBJECTIVE AND OBJECTIVE BOX
CHIEF COMPLAINT:     HPI: Pt is a 85y s/p Fall. Negative head strike. No other passenger. No other vehicles involved.  Felt immediate pain. Able to walk after incident, found to have a left intertrochanteric hip fracture and an L4 vertebral compression fracture possibly acute. Denies paresthesias, denies incontinence of bowel or bladder.     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  Hypothyroidism, unspecified type  Shortening, leg, congenital, right  No significant past surgical history      FAMILY HISTORY:  Family history of lung cancer (Sibling)  Family history of stomach cancer (Sibling)      SOCIAL HISTORY:     Vital Signs Last 24 Hrs  T(C): 36.5 (23 Mar 2018 03:16), Max: 36.5 (23 Mar 2018 03:16)  T(F): 97.7 (23 Mar 2018 03:16), Max: 97.7 (23 Mar 2018 03:16)  HR: 106 (23 Mar 2018 03:16) (101 - 115)  BP: 131/75 (23 Mar 2018 03:16) (131/75 - 154/67)  BP(mean): --  RR: 20 (23 Mar 2018 03:16) (20 - 20)  SpO2: 92% (23 Mar 2018 03:16) (92% - 95%)  I&O's Detail      LABS:                        11.0   18.44 )-----------( 275      ( 23 Mar 2018 04:33 )             34.1     03-23    137  |  102  |  25<H>  ----------------------------<  151<H>  4.4   |  30  |  0.58    Ca    8.4<L>      23 Mar 2018 04:33    TPro  7.4  /  Alb  2.3<L>  /  TBili  0.5  /  DBili  x   /  AST  18  /  ALT  21  /  AlkPhos  137<H>  03-22    PT/INR - ( 23 Mar 2018 04:33 )   PT: 13.8 sec;   INR: 1.27 ratio         PTT - ( 23 Mar 2018 04:33 )  PTT:28.1 sec  Urinalysis Basic - ( 22 Mar 2018 13:48 )    Color: Yellow / Appearance: Clear / S.015 / pH: x  Gluc: x / Ketone: Trace  / Bili: Negative / Urobili: Negative mg/dL   Blood: x / Protein: 30 mg/dL / Nitrite: Negative   Leuk Esterase: Trace / RBC: 25-50 /HPF / WBC 3-5   Sq Epi: x / Non Sq Epi: Few / Bacteria: Occasional        RADIOLOGY & ADDITIONAL STUDIES:    PHYSICAL EXAM:  General; Awake and alert, Oriented x 3  Hips/Pelvis:   LLE: Unable to examine for range of motion at hip and knee joint/strength 2/2 acute L hip fracture, +Healstrike/log roll  No loss of sensation, SILT L2-S1, negative babinski, negative clonus  RLE  ABle to SLR. Negative log roll, heel strike. No pain on passive Int/Ext hip rotation.  EHL, FHL, TA, GS Symmetric  Neck: supple, NT, Full Painless baseline AROM  Back:

## 2018-03-23 NOTE — DISCHARGE NOTE ADULT - MEDICATION SUMMARY - MEDICATIONS TO TAKE
I will START or STAY ON the medications listed below when I get home from the hospital:    predniSONE 10 mg oral tablet  -- 3 tab(s) by mouth once a day 3/31-4/2  2tabs daily 4/3-5, 1tab daily 4/6-8  -- Indication: For COPD (chronic obstructive pulmonary disease)    budesonide 90 mcg/inh inhalation powder  -- 2 puff(s) inhaled 2 times a day  -- Indication: For Chronic obstructive pulmonary disease, unspecified COPD type    oxyCODONE 5 mg oral tablet  -- 1 tab(s) by mouth every 6 hours, As Needed for severe pain  -- Indication: For Prn for pain    acetaminophen 325 mg oral tablet  -- 2 tab(s) by mouth every 6 hours, As needed, For Temp greater than 38 C (100.4 F) or headache or mild pain  -- Indication: For Prn for temp    aspirin 81 mg oral tablet, chewable  -- 1 tab(s) by mouth once a day  -- Indication: For Cardiac    enoxaparin  -- 40 milligram(s) subcutaneous once a day 4 weeks till 4/20/18 or until fully ambulatory  -- Indication: For DVT proph    mirtazapine 30 mg oral tablet  -- 1 tab(s) by mouth once a day (at bedtime)  -- Indication: For Sleep    atorvastatin 40 mg oral tablet  -- 1 tab(s) by mouth once a day (at bedtime)  -- Indication: For Cholesterol    OLANZapine 5 mg oral tablet  -- 1 tab(s) by mouth once a day (at bedtime)  -- Indication: For depression    ipratropium-albuterol 0.5 mg-2.5 mg/3 mLinhalation solution  -- 3 milliliter(s) inhaled every 6 hours  -- Indication: For Chronic obstructive pulmonary disease, unspecified COPD type    albuterol 90 mcg/inh inhalation aerosol  -- 1 puff(s) inhaled every 4 hours, As Needed for SOB/wheezing  -- Indication: For Chronic obstructive pulmonary disease, unspecified COPD type    cefuroxime 250 mg oral tablet  -- 1 tab(s) by mouth every 12 hours, last day 4/5/18  -- Indication: For Pneumonia    Mucinex 600 mg oral tablet, extended release  -- 1 tab(s) by mouth every 12 hours, As Needed  -- Indication: For Chronic obstructive pulmonary disease, unspecified COPD type    potassium chloride 20 mEq oral tablet, extended release  -- 2 tab(s) by mouth once a day  -- Indication: For Supplement    levothyroxine 100 mcg (0.1 mg) oral tablet  -- 1 tab(s) by mouth once a day  -- Indication: For thyroid    Multiple Vitamins oral tablet  -- 1 tab(s) by mouth once a day  -- Indication: For vitamin

## 2018-03-23 NOTE — DISCHARGE NOTE ADULT - PATIENT PORTAL LINK FT
You can access the [facility] Patient Portal, offered by [facility], by registering with the following website: [email or web address]

## 2018-03-23 NOTE — DISCHARGE NOTE ADULT - PLAN OF CARE
return to ADLs 1.	Pain Control  2.	Weight Bearing  left lower Extremity, with assistive devices (walker/Cane as Needed)  3.	DVT Prophylaxis  4.	PT as needed  5.	Follow up with Dr. Mireles as Outpatient in 10-14 Days after Discharge from the Hospital or Rehab. Call Office For Appointment.  6.	Staples/Sutures to be removed  Post-Op Day 14, and repeat x-rays  7.	Ice/Elevate affected area as Needed  8.	Keep Dressing Clean and dry. IM Nail DC Instructions:    1.	Resume previous diet, regular or diabetic as appropriate  2.	Weight Bearing as Tolerated with assistance and rolling walker  3.	Continue DVT/PE Prophylaxis. Lovenox SC. See Med Rec for Duration and dose.  4.	PT daily  5.	Follow up with Orthopedic Surgeon DR. MK SHORT in 14 Days. Call Office asap For Appointment.  6.	Staples to be removed by RN Post-Op Day 14 (4/6/18), provided wound is healed, no open areas or copious drainage.  7.	Ice the hip as much as possible  8.	Keep Aquacel bandage on Hip. Change only if wet or soiled using Tegaderm/Paper tape and dry gauze. No bandage needed after staple removal.  9.     OK to shower with Aquacel beige bandage, otherwise sponge bathe only. Will need assistance to shower.

## 2018-03-23 NOTE — PROGRESS NOTE ADULT - SUBJECTIVE AND OBJECTIVE BOX
Orthopedics Post-op Check    This is an 86y/o Female s/p Left Hip IMN POD 0.  Pain is controlled. Pt feeling well. No nausea or vomiting.    Vital Signs Last 24 Hrs  T(C): 36.4 (03-23-18 @ 18:40), Max: 36.9 (03-23-18 @ 10:51)  T(F): 97.5 (03-23-18 @ 18:40), Max: 98.5 (03-23-18 @ 10:51)  HR: 96 (03-23-18 @ 18:40) (90 - 115)  BP: 113/60 (03-23-18 @ 18:40) (84/48 - 152/65)  BP(mean): --  RR: 18 (03-23-18 @ 18:40) (17 - 26)  SpO2: 99% (03-23-18 @ 18:40) (92% - 99%)                        9.8    15.59 )-----------( 272      ( 23 Mar 2018 17:33 )             31.7     23 Mar 2018 17:33    140    |  104    |  26     ----------------------------<  113    4.3     |  30     |  0.75     Ca    8.3        23 Mar 2018 17:33    TPro  7.4    /  Alb  2.3    /  TBili  0.5    /  DBili  x      /  AST  18     /  ALT  21     /  AlkPhos  137    22 Mar 2018 13:17    PT/INR - ( 23 Mar 2018 04:33 )   PT: 13.8 sec;   INR: 1.27 ratio         PTT - ( 23 Mar 2018 04:33 )  PTT:28.1 sec    Exam:  NAD AAOx3.  Dressing clean and dry.  Abduction pillow and SCDs in place.  Calves are soft and nontender.  Moving all toes, sensation intact.  DP and PT pulses 2+.    A/P:  Stable POD 0 Left Hip IMN  -Analgesia  -Ppx ABX  -DVT PE ppx  -OOB PT WBAT LLE

## 2018-03-23 NOTE — BRIEF OPERATIVE NOTE - PROCEDURE
<<-----Click on this checkbox to enter Procedure Antegrade intramedullary nailing of femur  03/23/2018    Active  ESTAPLETON

## 2018-03-23 NOTE — DISCHARGE NOTE ADULT - HOSPITAL COURSE
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 Pt is afebrile with stable vital signs. Pain is controlled. Exam reveals intact EHL FHL TA GS, +DP. Dressing is clean and dry with a New Aquacel bandage on.  Vital Signs Last 24 Hrs  T(C): 36.5 (03-25-18 @ 23:41), Max: 36.9 (03-25-18 @ 16:58)  T(F): 97.7 (03-25-18 @ 23:41), Max: 98.5 (03-25-18 @ 16:58)  HR: 103 (03-25-18 @ 23:41) (89 - 106)  BP: 123/55 (03-25-18 @ 23:41) (121/63 - 142/64)  BP(mean): --  RR: 18 (03-25-18 @ 23:41) (18 - 18)  SpO2: 95% (03-25-18 @ 23:41) (73% - 96%)                        8.7    10.63 )-----------( 239      ( 26 Mar 2018 05:36 )             27.7         Hospital Course:  Patient presented to [facility] ED after a fall, found to have an intertrochanteric hip fracture, as well as a Vertebral column fx (treated nonoperatively in  LSO brace) and admitted to the Medical Service. Pt was  medically/cardiac cleared prior to surgery. Prophylactic antibiotics were started before the procedure and continued for 24 hours. They were admitted after surgery to the orthopedic floor.  There were no complications during the hospital stay. All home medications were continued.    Routine consults were obtained from the Anticoagulation Team for DVT/PE prophylaxis, from Physical Therapy for twice daily PT, and follwed by Medicine for Co-management. Patient was placed on LOVEONOX for anticoagulation.  Pertinent home medications were continued.  Daily labs were followed.      On POD 0 there were no major issues. Pt received PT daily, and a new Aquacel dressing was applied prior to discharge. The pt will likely need DC to Rehab for ongoing PT once she receives her LSO for her back evaluated and ok per Medicine.   The orthopedic Attending is aware and agrees. See addendum to DC summary per medical team below for any additional info or if any changes.

## 2018-03-23 NOTE — DISCHARGE NOTE ADULT - MEDICATION SUMMARY - MEDICATIONS TO STOP TAKING
I will STOP taking the medications listed below when I get home from the hospital:    potassium chloride 20 mEq oral tablet, extended release  -- 2 tab(s) by mouth once a day

## 2018-03-23 NOTE — DISCHARGE NOTE ADULT - CARE PLAN
Principal Discharge DX:	Closed fracture of left hip, initial encounter  Goal:	return to ADLs  Assessment and plan of treatment:	1.	Pain Control  2.	Weight Bearing  left lower Extremity, with assistive devices (walker/Cane as Needed)  3.	DVT Prophylaxis  4.	PT as needed  5.	Follow up with Dr. Mireles as Outpatient in 10-14 Days after Discharge from the Hospital or Rehab. Call Office For Appointment.  6.	Staples/Sutures to be removed  Post-Op Day 14, and repeat x-rays  7.	Ice/Elevate affected area as Needed  8.	Keep Dressing Clean and dry. Principal Discharge DX:	Closed fracture of left hip, initial encounter  Goal:	return to ADLs  Assessment and plan of treatment:	IM Nail DC Instructions:    1.	Resume previous diet, regular or diabetic as appropriate  2.	Weight Bearing as Tolerated with assistance and rolling walker  3.	Continue DVT/PE Prophylaxis. Lovenox SC. See Med Rec for Duration and dose.  4.	PT daily  5.	Follow up with Orthopedic Surgeon DR. MK SHORT in 14 Days. Call Office asap For Appointment.  6.	Staples to be removed by RN Post-Op Day 14 (4/6/18), provided wound is healed, no open areas or copious drainage.  7.	Ice the hip as much as possible  8.	Keep Aquacel bandage on Hip. Change only if wet or soiled using Tegaderm/Paper tape and dry gauze. No bandage needed after staple removal.  9.     OK to shower with Aquacel beige bandage, otherwise sponge bathe only. Will need assistance to shower.

## 2018-03-23 NOTE — PROGRESS NOTE ADULT - SUBJECTIVE AND OBJECTIVE BOX
c/c: mechanical fall      HPI:  86yo/F with PMH COPD, hypothyroidism, hyperlipidemia, psychiatric history on xyprexa, dementia who presented s/p mechanical fall.  She normally walks with the walker, she did not take one with her when she went to the bathroom and she lost her balance and fell, sustaining LT hip fracture. She was admitted for further management.   Pt seen and examined this am with daughter at bedside. No pain. No sob/chest pain. No n/v/d/abd pain. Daughter states she just came from from rehab this past monday. She was treated for UTI on her previous admission and since then has developed a cough. No f/c/r.       Review of system- All 10 systems reviewed and is as per HPI otherwise negative.     VITALS  T(C): 36.9 (18 @ 10:51), Max: 36.9 (18 @ 10:51)  HR: 107 (18 @ 10:51) (100 - 115)  BP: 134/80 (18 @ 10:51) (131/75 - 152/65)  RR: 18 (18 @ 10:51) (18 - 20)  SpO2: 94% (18 @ 10:51) (92% - 95%)    PHYSICAL EXAM:    GENERAL: Comfortable, no acute distress  HEAD:  Atraumatic, Normocephalic  EYES: EOMI, PERRLA  HEENT: dry mucous membranes  NECK: Supple, No JVD  NERVOUS SYSTEM:  non focal, confused  CHEST/LUNG: Clear to auscultation bilaterally  HEART: S1,S2+, Regular rate and rhythm;   ABDOMEN: Soft, Nontender, Nondistended; Bowel sounds present  GENITOURINARY- Voiding, no palpable bladder  EXTREMITIES:  No clubbing, cyanosis, or edema  MUSCULOSKELETAL- left hip pain with movement.   SKIN-no rash        LABS:                        11.0   18.44 )-----------( 275      ( 23 Mar 2018 04:33 )             34.1         137  |  102  |  25<H>  ----------------------------<  151<H>  4.4   |  30  |  0.58    Ca    8.4<L>      23 Mar 2018 04:33    TPro  7.4  /  Alb  2.3<L>  /  TBili  0.5  /  DBili  x   /  AST  18  /  ALT  21  /  AlkPhos  137<H>  03-22    PT/INR - ( 23 Mar 2018 04:33 )   PT: 13.8 sec;   INR: 1.27 ratio         PTT - ( 23 Mar 2018 04:33 )  PTT:28.1 sec  Urinalysis Basic - ( 22 Mar 2018 13:48 )    Color: Yellow / Appearance: Clear / S.015 / pH: x  Gluc: x / Ketone: Trace  / Bili: Negative / Urobili: Negative mg/dL   Blood: x / Protein: 30 mg/dL / Nitrite: Negative   Leuk Esterase: Trace / RBC: 25-50 /HPF / WBC 3-5   Sq Epi: x / Non Sq Epi: Few / Bacteria: Occasional        MEDS  acetaminophen   Tablet 650 milliGRAM(s) Oral every 6 hours PRN  acetaminophen   Tablet. 650 milliGRAM(s) Oral every 6 hours PRN  ALBUTerol    0.083% 2.5 milliGRAM(s) Nebulizer every 6 hours  ALBUTerol    0.083% 2.5 milliGRAM(s) Nebulizer every 3 hours PRN  aluminum hydroxide/magnesium hydroxide/simethicone Suspension 30 milliLiter(s) Oral every 4 hours PRN  aspirin  chewable 81 milliGRAM(s) Oral daily  atorvastatin 40 milliGRAM(s) Oral at bedtime  buDESOnide   0.25 milliGRAM(s) Respule 0.25 milliGRAM(s) Inhalation every 12 hours  docusate sodium 100 milliGRAM(s) Oral three times a day  fluticasone propionate 50 MICROgram(s)/spray Nasal Spray 1 Spray(s) Both Nostrils two times a day  guaiFENesin  milliGRAM(s) Oral every 12 hours  lactated ringers. 1000 milliLiter(s) IV Continuous <Continuous>  levothyroxine 100 MICROGram(s) Oral daily  mirtazapine 30 milliGRAM(s) Oral at bedtime  morphine  - Injectable 2 milliGRAM(s) IV Push every 4 hours PRN  multivitamin 1 Tablet(s) Oral daily  OLANZapine 5 milliGRAM(s) Oral at bedtime  ondansetron Injectable 4 milliGRAM(s) IV Push every 6 hours PRN  oxyCODONE    IR 5 milliGRAM(s) Oral every 4 hours PRN  senna 2 Tablet(s) Oral at bedtime PRN c/c: mechanical fall      HPI:  86yo/F with PMH COPD, hypothyroidism, hyperlipidemia, psychiatric history on xyprexa, dementia who presented s/p mechanical fall.  She normally walks with the walker, she did not take one with her when she went to the bathroom and she lost her balance and fell, sustaining LT hip fracture. She was admitted for further management.   Pt seen and examined this am with daughter at bedside. No pain. No sob/chest pain. No n/v/d/abd pain. Daughter states she just came from from rehab this past monday. She was treated for UTI on her previous admission and since then has developed a cough. No f/c/r.       Review of system- All 10 systems reviewed and is as per HPI otherwise negative.     Vital Signs Last 24 Hrs  T(C): 36.9 (23 Mar 2018 10:51), Max: 36.9 (23 Mar 2018 10:51)  T(F): 98.5 (23 Mar 2018 10:51), Max: 98.5 (23 Mar 2018 10:51)  HR: 107 (23 Mar 2018 10:51) (100 - 115)  BP: 134/80 (23 Mar 2018 10:51) (131/75 - 152/65)  RR: 18 (23 Mar 2018 10:51) (18 - 20)  SpO2: 94% (23 Mar 2018 10:51) (92% - 95%)  PHYSICAL EXAM:    GENERAL: Comfortable, no acute distress  HEAD:  Atraumatic, Normocephalic  EYES: EOMI, PERRLA  HEENT: dry mucous membranes  NECK: Supple, No JVD  NERVOUS SYSTEM:  non focal, confused  CHEST/LUNG: Clear to auscultation bilaterally  HEART: S1,S2+, Regular rate and rhythm;   ABDOMEN: Soft, Nontender, Nondistended; Bowel sounds present  GENITOURINARY- Voiding, no palpable bladder  EXTREMITIES:  No clubbing, cyanosis, or edema  MUSCULOSKELETAL- left hip pain with movement.   SKIN-no rash        LABS:                        11.0   18.44 )-----------( 275      ( 23 Mar 2018 04:33 )             34.1         137  |  102  |  25<H>  ----------------------------<  151<H>  4.4   |  30  |  0.58    Ca    8.4<L>      23 Mar 2018 04:33    TPro  7.4  /  Alb  2.3<L>  /  TBili  0.5  /  DBili  x   /  AST  18  /  ALT  21  /  AlkPhos  137<H>  03-22    PT/INR - ( 23 Mar 2018 04:33 )   PT: 13.8 sec;   INR: 1.27 ratio         PTT - ( 23 Mar 2018 04:33 )  PTT:28.1 sec  Urinalysis Basic - ( 22 Mar 2018 13:48 )    Color: Yellow / Appearance: Clear / S.015 / pH: x  Gluc: x / Ketone: Trace  / Bili: Negative / Urobili: Negative mg/dL   Blood: x / Protein: 30 mg/dL / Nitrite: Negative   Leuk Esterase: Trace / RBC: 25-50 /HPF / WBC 3-5   Sq Epi: x / Non Sq Epi: Few / Bacteria: Occasional      MEDICATIONS  (STANDING):  ALBUTerol    0.083% 2.5 milliGRAM(s) Nebulizer every 6 hours  aspirin  chewable 81 milliGRAM(s) Oral daily  atorvastatin 40 milliGRAM(s) Oral at bedtime  buDESOnide   0.25 milliGRAM(s) Respule 0.25 milliGRAM(s) Inhalation every 12 hours  docusate sodium 100 milliGRAM(s) Oral three times a day  fluticasone propionate 50 MICROgram(s)/spray Nasal Spray 1 Spray(s) Both Nostrils two times a day  guaiFENesin  milliGRAM(s) Oral every 12 hours  lactated ringers. 1000 milliLiter(s) (75 mL/Hr) IV Continuous <Continuous>  levothyroxine 100 MICROGram(s) Oral daily  mirtazapine 30 milliGRAM(s) Oral at bedtime  multivitamin 1 Tablet(s) Oral daily  OLANZapine 5 milliGRAM(s) Oral at bedtime    MEDICATIONS  (PRN):  acetaminophen   Tablet 650 milliGRAM(s) Oral every 6 hours PRN For Temp greater than 38 C (100.4 F)  acetaminophen   Tablet. 650 milliGRAM(s) Oral every 6 hours PRN Mild Pain (1 - 3)  ALBUTerol    0.083% 2.5 milliGRAM(s) Nebulizer every 3 hours PRN Shortness of Breath and/or Wheezing  aluminum hydroxide/magnesium hydroxide/simethicone Suspension 30 milliLiter(s) Oral every 4 hours PRN Dyspepsia  morphine  - Injectable 2 milliGRAM(s) IV Push every 4 hours PRN Severe Pain (7 - 10)  ondansetron Injectable 4 milliGRAM(s) IV Push every 6 hours PRN Nausea  oxyCODONE    IR 5 milliGRAM(s) Oral every 4 hours PRN Moderate Pain (4 - 6)  senna 2 Tablet(s) Oral at bedtime PRN Constipation      ASSESSMENT AND PLAN:  86yo/F with PMH COPD, hypothyroidism, hyperlipidemia, psychiatric history on xyprexa, dementia who is admitted with left hip fracture    1. S/p mechanical fall with LT hip fracture  -pain control  -for or today  -NO MEDICAL CONTRAINDICATIONS FOR OR AT THIS TIME    2. Leukocytosis:  -Afebrile  -cxr neg  -u/a neg for uti  -?reactive  -repeat in am.    3. COPD:  -continue nebs    4. Hypothyroidism:  -synthroid    5. HLD:  -statin    6. DVT px.

## 2018-03-24 LAB
ANION GAP SERPL CALC-SCNC: 7 MMOL/L — SIGNIFICANT CHANGE UP (ref 5–17)
BUN SERPL-MCNC: 25 MG/DL — HIGH (ref 7–23)
CALCIUM SERPL-MCNC: 8.6 MG/DL — SIGNIFICANT CHANGE UP (ref 8.5–10.1)
CHLORIDE SERPL-SCNC: 103 MMOL/L — SIGNIFICANT CHANGE UP (ref 96–108)
CO2 SERPL-SCNC: 30 MMOL/L — SIGNIFICANT CHANGE UP (ref 22–31)
CREAT SERPL-MCNC: 0.45 MG/DL — LOW (ref 0.5–1.3)
GLUCOSE SERPL-MCNC: 103 MG/DL — HIGH (ref 70–99)
HCT VFR BLD CALC: 30.9 % — LOW (ref 34.5–45)
HGB BLD-MCNC: 9.6 G/DL — LOW (ref 11.5–15.5)
MCHC RBC-ENTMCNC: 27 PG — SIGNIFICANT CHANGE UP (ref 27–34)
MCHC RBC-ENTMCNC: 31.1 GM/DL — LOW (ref 32–36)
MCV RBC AUTO: 86.8 FL — SIGNIFICANT CHANGE UP (ref 80–100)
NRBC # BLD: 0 /100 WBCS — SIGNIFICANT CHANGE UP (ref 0–0)
PLATELET # BLD AUTO: 255 K/UL — SIGNIFICANT CHANGE UP (ref 150–400)
POTASSIUM SERPL-MCNC: 4.5 MMOL/L — SIGNIFICANT CHANGE UP (ref 3.5–5.3)
POTASSIUM SERPL-SCNC: 4.5 MMOL/L — SIGNIFICANT CHANGE UP (ref 3.5–5.3)
RBC # BLD: 3.56 M/UL — LOW (ref 3.8–5.2)
RBC # FLD: 13.7 % — SIGNIFICANT CHANGE UP (ref 10.3–14.5)
SODIUM SERPL-SCNC: 140 MMOL/L — SIGNIFICANT CHANGE UP (ref 135–145)
WBC # BLD: 12.88 K/UL — HIGH (ref 3.8–10.5)
WBC # FLD AUTO: 12.88 K/UL — HIGH (ref 3.8–10.5)

## 2018-03-24 PROCEDURE — 99223 1ST HOSP IP/OBS HIGH 75: CPT

## 2018-03-24 RX ORDER — SODIUM CHLORIDE 9 MG/ML
1000 INJECTION INTRAMUSCULAR; INTRAVENOUS; SUBCUTANEOUS
Qty: 0 | Refills: 0 | Status: DISCONTINUED | OUTPATIENT
Start: 2018-03-24 | End: 2018-03-25

## 2018-03-24 RX ORDER — ASPIRIN/CALCIUM CARB/MAGNESIUM 324 MG
81 TABLET ORAL DAILY
Qty: 0 | Refills: 0 | Status: DISCONTINUED | OUTPATIENT
Start: 2018-03-25 | End: 2018-03-30

## 2018-03-24 RX ORDER — ENOXAPARIN SODIUM 100 MG/ML
40 INJECTION SUBCUTANEOUS DAILY
Qty: 0 | Refills: 0 | Status: DISCONTINUED | OUTPATIENT
Start: 2018-03-24 | End: 2018-03-30

## 2018-03-24 RX ORDER — OXYCODONE HYDROCHLORIDE 5 MG/1
5 TABLET ORAL EVERY 4 HOURS
Qty: 0 | Refills: 0 | Status: DISCONTINUED | OUTPATIENT
Start: 2018-03-24 | End: 2018-03-30

## 2018-03-24 RX ADMIN — Medication 1 MILLIGRAM(S): at 11:25

## 2018-03-24 RX ADMIN — ALBUTEROL 2.5 MILLIGRAM(S): 90 AEROSOL, METERED ORAL at 07:28

## 2018-03-24 RX ADMIN — ATORVASTATIN CALCIUM 40 MILLIGRAM(S): 80 TABLET, FILM COATED ORAL at 21:13

## 2018-03-24 RX ADMIN — ENOXAPARIN SODIUM 40 MILLIGRAM(S): 100 INJECTION SUBCUTANEOUS at 17:35

## 2018-03-24 RX ADMIN — OXYCODONE HYDROCHLORIDE 5 MILLIGRAM(S): 5 TABLET ORAL at 12:19

## 2018-03-24 RX ADMIN — Medication 500 MILLIGRAM(S): at 05:52

## 2018-03-24 RX ADMIN — Medication 100 MILLIGRAM(S): at 13:38

## 2018-03-24 RX ADMIN — Medication 325 MILLIGRAM(S): at 17:34

## 2018-03-24 RX ADMIN — ALBUTEROL 2.5 MILLIGRAM(S): 90 AEROSOL, METERED ORAL at 14:06

## 2018-03-24 RX ADMIN — OXYCODONE HYDROCHLORIDE 5 MILLIGRAM(S): 5 TABLET ORAL at 11:25

## 2018-03-24 RX ADMIN — Medication 325 MILLIGRAM(S): at 05:52

## 2018-03-24 RX ADMIN — ALBUTEROL 2.5 MILLIGRAM(S): 90 AEROSOL, METERED ORAL at 20:17

## 2018-03-24 RX ADMIN — Medication 600 MILLIGRAM(S): at 05:52

## 2018-03-24 RX ADMIN — Medication 1 TABLET(S): at 11:26

## 2018-03-24 RX ADMIN — Medication 0.25 MILLIGRAM(S): at 20:17

## 2018-03-24 RX ADMIN — Medication 100 MILLIGRAM(S): at 00:26

## 2018-03-24 RX ADMIN — Medication 325 MILLIGRAM(S): at 08:19

## 2018-03-24 RX ADMIN — Medication 100 MILLIGRAM(S): at 21:13

## 2018-03-24 RX ADMIN — Medication 0.25 MILLIGRAM(S): at 07:30

## 2018-03-24 RX ADMIN — Medication 100 MILLIGRAM(S): at 08:19

## 2018-03-24 RX ADMIN — OLANZAPINE 5 MILLIGRAM(S): 15 TABLET, FILM COATED ORAL at 21:13

## 2018-03-24 RX ADMIN — MIRTAZAPINE 30 MILLIGRAM(S): 45 TABLET, ORALLY DISINTEGRATING ORAL at 21:14

## 2018-03-24 RX ADMIN — Medication 600 MILLIGRAM(S): at 17:34

## 2018-03-24 RX ADMIN — Medication 500 MILLIGRAM(S): at 17:36

## 2018-03-24 RX ADMIN — Medication 100 MICROGRAM(S): at 05:52

## 2018-03-24 RX ADMIN — Medication 325 MILLIGRAM(S): at 11:25

## 2018-03-24 NOTE — PHYSICAL THERAPY INITIAL EVALUATION ADULT - MANUAL MUSCLE TESTING RESULTS, REHAB EVAL
Bilateral UE strength 3+/5. Left LE quad set F, DF WNL. Right LE strength 3+/5. All MMT performed within available ROM.

## 2018-03-24 NOTE — CONSULT NOTE ADULT - SUBJECTIVE AND OBJECTIVE BOX
HPI  HPI:  84yo/F with PMH COPD, hypothyroidism, hyperlipidemia presented s/p mechanical fall earlier today. She normally walks with the walker, she did not take one with her when she went to the bathroom and she lost her balance and fell, sustaining LT hip fracture. She denies LOC. (22 Mar 2018 15:59)      Patient is a 85y old  Female who presents with a chief complaint of s/p mechanical fall (23 Mar 2018 19:59) is now s/p IM nail 3.23 with Dr. Mireles.      Consulted by Dr. Mireles for VTE prophylaxis, risk stratification, and anticoagulation management.    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 Awake, alert, and appropriate. Discussed need for anticoagulation given fracture and repair and high risk. Patient verbalized understanding. Reports diet as "bad"- only admits to eating breakfast with very little PO intake otherwise.     FAMILY HISTORY:  Family history of lung cancer (Sibling)  Family history of stomach cancer (Sibling)    Denies any personal or familial history of clotting or bleeding disorders.    Allergies    No Known Allergies    Intolerances    REVIEW OF SYSTEMS    (  )Fever	     (  )Constipation	(  )SOB				(  )Headache	(  )Dysuria  (  )Chills	     (  )Melena	(  )Dyspnea present on exertion	                    (  )Dizziness                    (  )Polyuria  (  )Nausea	     (  )Hematochezia	(  )Cough			                    (  )Syncope   	(  )Hematuria  (  )Vomiting    (  )Chest Pain	(  )Wheezing			(  )Weakness  (  )Diarrhea     (  )Palpitations	(  )Anorexia			(  )Myalgia    All other review of systems negative: Yes    PHYSICAL EXAM:    Constitutional: Appears Well    Neurological: A& O x 3    Skin: Warm    Respiratory and Thorax: normal effort; Breath sounds: decreased with rhonchi b/l + nonprod cough  	  Cardiovascular: S1, S2, regular, NMBR	    Gastrointestinal: BS + x 4Q, nontender	    Genitourinary:  Bladder nondistended, nontender    Musculoskeletal:   General Right:   no muscle/joint tenderness,   normal tone, no joint swelling,   ROM: full	    General Left:   no muscle/joint tenderness,   normal tone, no joint swelling,   ROM: limited    Hip:  Left: Dressing CDI; aquacels x 3    Lower extrems:   Right: no calf tenderness              negative yasmany's sign               + pedal pulses    Left:   no calf tenderness              negative yasmany's sign               + pedal pulses                          9.6    12.88 )-----------( 255      ( 24 Mar 2018 05:42 )             30.9       03-24    140  |  103  |  25<H>  ----------------------------<  103<H>  4.5   |  30  |  0.45<L>    Ca    8.6      24 Mar 2018 05:42    TPro  7.4  /  Alb  2.3<L>  /  TBili  0.5  /  DBili  x   /  AST  18  /  ALT  21  /  AlkPhos  137<H>  03-22      PT/INR - ( 23 Mar 2018 04:33 )   PT: 13.8 sec;   INR: 1.27 ratio         PTT - ( 23 Mar 2018 04:33 )  PTT:28.1 sec				    MEDICATIONS  (STANDING):  ALBUTerol    0.083% 2.5 milliGRAM(s) Nebulizer every 6 hours  ALBUTerol    90 MICROgram(s) HFA Inhaler 1 Puff(s) Inhalation every 4 hours  ascorbic acid 500 milliGRAM(s) Oral two times a day  atorvastatin 40 milliGRAM(s) Oral at bedtime  buDESOnide   0.25 milliGRAM(s) Respule 0.25 milliGRAM(s) Inhalation every 12 hours  docusate sodium 100 milliGRAM(s) Oral three times a day  enoxaparin Injectable 40 milliGRAM(s) SubCutaneous daily  ferrous    sulfate 325 milliGRAM(s) Oral three times a day with meals  folic acid 1 milliGRAM(s) Oral daily  guaiFENesin  milliGRAM(s) Oral every 12 hours  lactated ringers. 1000 milliLiter(s) IV Continuous <Continuous>  levothyroxine 100 MICROGram(s) Oral daily  mirtazapine 30 milliGRAM(s) Oral at bedtime  multivitamin 1 Tablet(s) Oral daily  OLANZapine 5 milliGRAM(s) Oral at bedtime  sodium chloride 0.9%. 1000 milliLiter(s) IV Continuous <Continuous>      Vital Signs Last 24 Hrs  T(C): 36.6 (24 Mar 2018 06:35), Max: 36.9 (23 Mar 2018 10:51)  T(F): 97.8 (24 Mar 2018 06:35), Max: 98.5 (23 Mar 2018 10:51)  HR: 97 (24 Mar 2018 07:34) (90 - 107)  BP: 141/73 (24 Mar 2018 06:35) (84/48 - 143/83)  BP(mean): --  RR: 20 (24 Mar 2018 06:35) (16 - 26)  SpO2: 97% (24 Mar 2018 07:34) (93% - 99%)    DVT Prophylaxis:  LMWH                   (x  )  Heparin SQ           (  )  Coumadin             (  )  Xarelto                  (  )  Eliquis                   (  )  Venodynes           (x  )  Ambulation          ( x )  UFH                       (  )  Contraindicated  (  )

## 2018-03-24 NOTE — PHYSICAL THERAPY INITIAL EVALUATION ADULT - ADDITIONAL COMMENTS
no stairs at home no stairs at home. Pt states she has a LLI but does not wear insert or special shoe.

## 2018-03-24 NOTE — PROGRESS NOTE ADULT - SUBJECTIVE AND OBJECTIVE BOX
c/c: mechanical fall      HPI:  86yo/F with PMH COPD, hypothyroidism, hyperlipidemia, psychiatric history on xyprexa, dementia, recently came home from rehab,  who presented s/p mechanical fall.  She normally walks with the walker, she did not take one with her when she went to the bathroom and she lost her balance and fell, sustaining LT hip fracture. She was admitted for further management. Underwent IM nail 3/23    3/24: pt seen and examined this am. Felt well. No pain at rest. No sob/chest pain. had just eaten breakfast.    Review of system- All 10 systems reviewed and is as per HPI otherwise negative.     Vital Signs Last 24 Hrs  T(C): 36.6 (24 Mar 2018 06:35), Max: 36.9 (23 Mar 2018 10:51)  T(F): 97.8 (24 Mar 2018 06:35), Max: 98.5 (23 Mar 2018 10:51)  HR: 97 (24 Mar 2018 07:34) (90 - 107)  BP: 141/73 (24 Mar 2018 06:35) (84/48 - 143/83)  RR: 20 (24 Mar 2018 06:35) (16 - 26)  SpO2: 97% (24 Mar 2018 07:34) (93% - 99%)  PHYSICAL EXAM:    GENERAL: Comfortable, no acute distress  HEAD:  Atraumatic, Normocephalic  EYES: EOMI, PERRLA  HEENT: dry mucous membranes  NECK: Supple, No JVD  NERVOUS SYSTEM:  non focal, confused  CHEST/LUNG: Clear to auscultation bilaterally  HEART: S1,S2+, Regular rate and rhythm;   ABDOMEN: Soft, Nontender, Nondistended; Bowel sounds present  GENITOURINARY- Voiding, no palpable bladder  EXTREMITIES:  No clubbing, cyanosis, or edema  MUSCULOSKELETAL- left hip dressing intact  SKIN-no rash    LABS:                        9.6    12.88 )-----------( 255      ( 24 Mar 2018 05:42 )             30.9     03-24    140  |  103  |  25<H>  ----------------------------<  103<H>  4.5   |  30  |  0.45<L>    Ca    8.6      24 Mar 2018 05:42    TPro  7.4  /  Alb  2.3<L>  /  TBili  0.5  /  DBili  x   /  AST  18  /  ALT  21  /  AlkPhos  137<H>  03-22    PT/INR - ( 23 Mar 2018 04:33 )   PT: 13.8 sec;   INR: 1.27 ratio         PTT - ( 23 Mar 2018 04:33 )  PTT:28.1 sec  Urinalysis Basic - ( 22 Mar 2018 13:48 )    Color: Yellow / Appearance: Clear / S.015 / pH: x  Gluc: x / Ketone: Trace  / Bili: Negative / Urobili: Negative mg/dL   Blood: x / Protein: 30 mg/dL / Nitrite: Negative   Leuk Esterase: Trace / RBC: 25-50 /HPF / WBC 3-5   Sq Epi: x / Non Sq Epi: Few / Bacteria: Occasional  MEDICATIONS  (STANDING):  ALBUTerol    0.083% 2.5 milliGRAM(s) Nebulizer every 6 hours  ALBUTerol    90 MICROgram(s) HFA Inhaler 1 Puff(s) Inhalation every 4 hours  ascorbic acid 500 milliGRAM(s) Oral two times a day  atorvastatin 40 milliGRAM(s) Oral at bedtime  buDESOnide   0.25 milliGRAM(s) Respule 0.25 milliGRAM(s) Inhalation every 12 hours  docusate sodium 100 milliGRAM(s) Oral three times a day  enoxaparin Injectable 40 milliGRAM(s) SubCutaneous daily  ferrous    sulfate 325 milliGRAM(s) Oral three times a day with meals  folic acid 1 milliGRAM(s) Oral daily  guaiFENesin  milliGRAM(s) Oral every 12 hours  lactated ringers. 1000 milliLiter(s) (75 mL/Hr) IV Continuous <Continuous>  levothyroxine 100 MICROGram(s) Oral daily  mirtazapine 30 milliGRAM(s) Oral at bedtime  multivitamin 1 Tablet(s) Oral daily  OLANZapine 5 milliGRAM(s) Oral at bedtime  sodium chloride 0.9%. 1000 milliLiter(s) (100 mL/Hr) IV Continuous <Continuous>    MEDICATIONS  (PRN):  acetaminophen   Tablet 650 milliGRAM(s) Oral every 6 hours PRN For Temp greater than 38 C (100.4 F) or headache  benzocaine 15 mG/menthol 3.6 mG Lozenge 1 Lozenge Oral every 3 hours PRN Sore Throat  diphenhydrAMINE   Capsule 25 milliGRAM(s) Oral every 6 hours PRN Rash and/or Itching  ondansetron Injectable 4 milliGRAM(s) IV Push every 6 hours PRN Nausea and/or Vomiting  oxyCODONE    IR 5 milliGRAM(s) Oral every 4 hours PRN Moderate Pain (4 - 6)  zaleplon 5 milliGRAM(s) Oral at bedtime PRN Insomnia    ASSESSMENT AND PLAN:  86yo/F with PMH COPD, hypothyroidism, hyperlipidemia, psychiatric history on xyprexa, dementia who is admitted with left hip fracture    1. S/p mechanical fall with LT hip fracture  -s/p IM nail pod#1  -pain control  -physical therapy  -incentive spirometry    2. Leukocytosis:  -Afebrile  -cxr neg  -u/a neg for uti  -likely reactive, improving      3. COPD:  -continue nebs    4. Hypothyroidism:  -synthroid    5. HLD:  -statin    6. DVT px.

## 2018-03-24 NOTE — PHYSICAL THERAPY INITIAL EVALUATION ADULT - ACTIVE RANGE OF MOTION EXAMINATION, REHAB EVAL
no Active ROM deficits were identified/bilateral shoulder flex ~ 90 degrees. Left LE not fully assessed, DF neutral. Right hip flex ~ 90 degrees and DF neutral.

## 2018-03-24 NOTE — CONSULT NOTE ADULT - ASSESSMENT
This is an 85 year old female s/p IM nail with high risk for VTE due to current pulmonary function, surgery, fracture, and immobility. Moderate bleeding risk due to age.    Explained the risk vs benefit of AC to prevent VTE with patient. Verbalized agreement with LMWH daily a one month period.    Plan:  ::DC ASA 325mg PO BID  ::Start Lovenox 40 mg SQ daily this evening  ::Resume ASA 81 mg PO daily starting tomorrow 3/25/18  ::Daily CBC/BMP  ::Venodynes b/l  ::Enc ambulation    Thank you for this consult will continue to follow.

## 2018-03-24 NOTE — PROGRESS NOTE ADULT - SUBJECTIVE AND OBJECTIVE BOX
Patient seen and examined. Pain controlled. No acute events overnight    Vital Signs Last 24 Hrs  T(C): 36.6 (24 Mar 2018 06:35), Max: 36.9 (23 Mar 2018 10:51)  T(F): 97.8 (24 Mar 2018 06:35), Max: 98.5 (23 Mar 2018 10:51)  HR: 104 (24 Mar 2018 06:35) (90 - 107)  BP: 141/73 (24 Mar 2018 06:35) (84/48 - 143/83)  BP(mean): --  RR: 20 (24 Mar 2018 06:35) (16 - 26)  SpO2: 97% (24 Mar 2018 06:35) (93% - 99%)      Physical Exam  Gen: NAD  LLE:   Dressing c/d/i  +EHL/FHL/Gsc/TA  SILT L3-S1  DP +  Compartments soft  No calf ttp    A/P: 85y Female s/p L IMN POD 1, and L4 VCF    Pain control  DVT ppx  PT/OT, WBAT  FU labs  Dispo planning  D/w attending

## 2018-03-24 NOTE — PHYSICAL THERAPY INITIAL EVALUATION ADULT - PERTINENT HX OF CURRENT PROBLEM, REHAB EVAL
Pt admitted to  following fall. Pt sustained a left intratrochanteric fx and L4 vertebral body fx. All test results show previously mentioned. H/H- 9.6/30.9, INR-1.27.

## 2018-03-25 DIAGNOSIS — R05 COUGH: ICD-10-CM

## 2018-03-25 DIAGNOSIS — S72.002A FRACTURE OF UNSPECIFIED PART OF NECK OF LEFT FEMUR, INITIAL ENCOUNTER FOR CLOSED FRACTURE: ICD-10-CM

## 2018-03-25 DIAGNOSIS — F32.5 MAJOR DEPRESSIVE DISORDER, SINGLE EPISODE, IN FULL REMISSION: ICD-10-CM

## 2018-03-25 DIAGNOSIS — R09.02 HYPOXEMIA: ICD-10-CM

## 2018-03-25 DIAGNOSIS — J44.9 CHRONIC OBSTRUCTIVE PULMONARY DISEASE, UNSPECIFIED: ICD-10-CM

## 2018-03-25 LAB
ANION GAP SERPL CALC-SCNC: 7 MMOL/L — SIGNIFICANT CHANGE UP (ref 5–17)
BUN SERPL-MCNC: 25 MG/DL — HIGH (ref 7–23)
CALCIUM SERPL-MCNC: 8.4 MG/DL — LOW (ref 8.5–10.1)
CHLORIDE SERPL-SCNC: 102 MMOL/L — SIGNIFICANT CHANGE UP (ref 96–108)
CO2 SERPL-SCNC: 30 MMOL/L — SIGNIFICANT CHANGE UP (ref 22–31)
CREAT SERPL-MCNC: 0.48 MG/DL — LOW (ref 0.5–1.3)
GLUCOSE SERPL-MCNC: 125 MG/DL — HIGH (ref 70–99)
HCT VFR BLD CALC: 29.5 % — LOW (ref 34.5–45)
HGB BLD-MCNC: 9.3 G/DL — LOW (ref 11.5–15.5)
MCHC RBC-ENTMCNC: 27.1 PG — SIGNIFICANT CHANGE UP (ref 27–34)
MCHC RBC-ENTMCNC: 31.5 GM/DL — LOW (ref 32–36)
MCV RBC AUTO: 86 FL — SIGNIFICANT CHANGE UP (ref 80–100)
NRBC # BLD: 0 /100 WBCS — SIGNIFICANT CHANGE UP (ref 0–0)
PLATELET # BLD AUTO: 231 K/UL — SIGNIFICANT CHANGE UP (ref 150–400)
POTASSIUM SERPL-MCNC: 4 MMOL/L — SIGNIFICANT CHANGE UP (ref 3.5–5.3)
POTASSIUM SERPL-SCNC: 4 MMOL/L — SIGNIFICANT CHANGE UP (ref 3.5–5.3)
RBC # BLD: 3.43 M/UL — LOW (ref 3.8–5.2)
RBC # FLD: 13.8 % — SIGNIFICANT CHANGE UP (ref 10.3–14.5)
SODIUM SERPL-SCNC: 139 MMOL/L — SIGNIFICANT CHANGE UP (ref 135–145)
WBC # BLD: 10.81 K/UL — HIGH (ref 3.8–10.5)
WBC # FLD AUTO: 10.81 K/UL — HIGH (ref 3.8–10.5)

## 2018-03-25 PROCEDURE — 71275 CT ANGIOGRAPHY CHEST: CPT | Mod: 26

## 2018-03-25 PROCEDURE — 99233 SBSQ HOSP IP/OBS HIGH 50: CPT

## 2018-03-25 PROCEDURE — 71045 X-RAY EXAM CHEST 1 VIEW: CPT | Mod: 26

## 2018-03-25 RX ORDER — BUDESONIDE AND FORMOTEROL FUMARATE DIHYDRATE 160; 4.5 UG/1; UG/1
2 AEROSOL RESPIRATORY (INHALATION)
Qty: 0 | Refills: 0 | Status: DISCONTINUED | OUTPATIENT
Start: 2018-03-25 | End: 2018-03-28

## 2018-03-25 RX ORDER — TIOTROPIUM BROMIDE 18 UG/1
1 CAPSULE ORAL; RESPIRATORY (INHALATION) DAILY
Qty: 0 | Refills: 0 | Status: DISCONTINUED | OUTPATIENT
Start: 2018-03-25 | End: 2018-03-30

## 2018-03-25 RX ORDER — IPRATROPIUM BROMIDE 0.2 MG/ML
500 SOLUTION, NON-ORAL INHALATION EVERY 6 HOURS
Qty: 0 | Refills: 0 | Status: DISCONTINUED | OUTPATIENT
Start: 2018-03-25 | End: 2018-03-30

## 2018-03-25 RX ORDER — BUDESONIDE, MICRONIZED 100 %
0.5 POWDER (GRAM) MISCELLANEOUS
Qty: 0 | Refills: 0 | Status: DISCONTINUED | OUTPATIENT
Start: 2018-03-25 | End: 2018-03-26

## 2018-03-25 RX ADMIN — Medication 650 MILLIGRAM(S): at 00:43

## 2018-03-25 RX ADMIN — MIRTAZAPINE 30 MILLIGRAM(S): 45 TABLET, ORALLY DISINTEGRATING ORAL at 20:29

## 2018-03-25 RX ADMIN — Medication 500 MILLIGRAM(S): at 17:30

## 2018-03-25 RX ADMIN — Medication 325 MILLIGRAM(S): at 11:26

## 2018-03-25 RX ADMIN — ENOXAPARIN SODIUM 40 MILLIGRAM(S): 100 INJECTION SUBCUTANEOUS at 11:26

## 2018-03-25 RX ADMIN — Medication 325 MILLIGRAM(S): at 08:25

## 2018-03-25 RX ADMIN — Medication 600 MILLIGRAM(S): at 17:31

## 2018-03-25 RX ADMIN — Medication 0.5 MILLIGRAM(S): at 20:26

## 2018-03-25 RX ADMIN — Medication 100 MILLIGRAM(S): at 05:34

## 2018-03-25 RX ADMIN — Medication 600 MILLIGRAM(S): at 05:34

## 2018-03-25 RX ADMIN — Medication 1 MILLIGRAM(S): at 11:27

## 2018-03-25 RX ADMIN — ATORVASTATIN CALCIUM 40 MILLIGRAM(S): 80 TABLET, FILM COATED ORAL at 20:29

## 2018-03-25 RX ADMIN — Medication 500 MICROGRAM(S): at 20:26

## 2018-03-25 RX ADMIN — OLANZAPINE 5 MILLIGRAM(S): 15 TABLET, FILM COATED ORAL at 20:28

## 2018-03-25 RX ADMIN — Medication 650 MILLIGRAM(S): at 17:30

## 2018-03-25 RX ADMIN — Medication 0.25 MILLIGRAM(S): at 11:36

## 2018-03-25 RX ADMIN — Medication 1 TABLET(S): at 11:27

## 2018-03-25 RX ADMIN — Medication 100 MICROGRAM(S): at 05:34

## 2018-03-25 RX ADMIN — Medication 81 MILLIGRAM(S): at 11:27

## 2018-03-25 RX ADMIN — Medication 325 MILLIGRAM(S): at 17:30

## 2018-03-25 NOTE — PROGRESS NOTE ADULT - SUBJECTIVE AND OBJECTIVE BOX
c/c: mechanical fall      HPI:  86yo/F with PMH COPD, hypothyroidism, hyperlipidemia, psychiatric history on xyprexa, dementia, recently came home from rehab,  who presented s/p mechanical fall.  She normally walks with the walker, she did not take one with her when she went to the bathroom and she lost her balance and fell, sustaining LT hip fracture. She was admitted for further management. Underwent IM nail 3/23    3/25: Pt seen and examined this am. Felt uncomfortable, tired. No f/c/r. Tolerating po. No acute overnight events.     Review of system- All 10 systems reviewed and is as per HPI otherwise negative.     Vital Signs Last 24 Hrs  T(C): 36.9 (24 Mar 2018 23:23), Max: 36.9 (24 Mar 2018 23:23)  T(F): 98.4 (24 Mar 2018 23:23), Max: 98.4 (24 Mar 2018 23:23)  HR: 89 (25 Mar 2018 11:37) (89 - 102)  BP: 121/64 (24 Mar 2018 23:23) (121/64 - 126/60)  RR: 18 (24 Mar 2018 23:23) (18 - 18)  SpO2: 73% (25 Mar 2018 11:37) (73% - 96%)  PHYSICAL EXAM:    GENERAL: Comfortable, no acute distress  HEAD:  Atraumatic, Normocephalic  EYES: EOMI, PERRLA  HEENT: moist mucous membranes  NECK: Supple, No JVD  NERVOUS SYSTEM:  non focal, confused  CHEST/LUNG: Clear to auscultation bilaterally  HEART: S1,S2+, Regular rate and rhythm;   ABDOMEN: Soft, Nontender, Nondistended; Bowel sounds present  GENITOURINARY- Voiding, no palpable bladder  EXTREMITIES:  No clubbing, cyanosis, or edema  MUSCULOSKELETAL- left hip dressing intact  SKIN-no rash  LABS:                        9.3    10.81 )-----------( 231      ( 25 Mar 2018 06:22 )             29.5     03-25    139  |  102  |  25<H>  ----------------------------<  125<H>  4.0   |  30  |  0.48<L>    Ca    8.4<L>      25 Mar 2018 06:22    MEDICATIONS  (STANDING):  ALBUTerol    0.083% 2.5 milliGRAM(s) Nebulizer every 6 hours  ALBUTerol    90 MICROgram(s) HFA Inhaler 1 Puff(s) Inhalation every 4 hours  ascorbic acid 500 milliGRAM(s) Oral two times a day  aspirin  chewable 81 milliGRAM(s) Oral daily  atorvastatin 40 milliGRAM(s) Oral at bedtime  buDESOnide   0.25 milliGRAM(s) Respule 0.25 milliGRAM(s) Inhalation every 12 hours  docusate sodium 100 milliGRAM(s) Oral three times a day  enoxaparin Injectable 40 milliGRAM(s) SubCutaneous daily  ferrous    sulfate 325 milliGRAM(s) Oral three times a day with meals  folic acid 1 milliGRAM(s) Oral daily  guaiFENesin  milliGRAM(s) Oral every 12 hours  lactated ringers. 1000 milliLiter(s) (75 mL/Hr) IV Continuous <Continuous>  levothyroxine 100 MICROGram(s) Oral daily  mirtazapine 30 milliGRAM(s) Oral at bedtime  multivitamin 1 Tablet(s) Oral daily  OLANZapine 5 milliGRAM(s) Oral at bedtime  sodium chloride 0.9%. 1000 milliLiter(s) (100 mL/Hr) IV Continuous <Continuous>    MEDICATIONS  (PRN):  acetaminophen   Tablet 650 milliGRAM(s) Oral every 6 hours PRN For Temp greater than 38 C (100.4 F) or headache  benzocaine 15 mG/menthol 3.6 mG Lozenge 1 Lozenge Oral every 3 hours PRN Sore Throat  diphenhydrAMINE   Capsule 25 milliGRAM(s) Oral every 6 hours PRN Rash and/or Itching  ondansetron Injectable 4 milliGRAM(s) IV Push every 6 hours PRN Nausea and/or Vomiting  oxyCODONE    IR 5 milliGRAM(s) Oral every 4 hours PRN Moderate Pain (4 - 6)  zaleplon 5 milliGRAM(s) Oral at bedtime PRN Insomnia    ASSESSMENT AND PLAN:  86yo/F with PMH COPD, hypothyroidism, hyperlipidemia, psychiatric history on xyprexa, dementia who is admitted with left hip fracture    1. S/p mechanical fall with LT hip fracture  -s/p IM nail pod#2  -pain control  -physical therapy  -incentive spirometry    2. Leukocytosis:  -Afebrile  -cxr neg  -u/a neg for uti  -likely reactive, improving      3. COPD:  -per d/w rn, patient gets more agitated/worked up with albuterol  -no wheeze on exam  -try atrovent prn.  -dc albuterol     4. Hypothyroidism:  -synthroid    5. HLD:  -statin    6. DVT px. c/c: mechanical fall      HPI:  84yo/F with PMH COPD, hypothyroidism, hyperlipidemia, psychiatric history on xyprexa, dementia, recently came home from rehab,  who presented s/p mechanical fall.  She normally walks with the walker, she did not take one with her when she went to the bathroom and she lost her balance and fell, sustaining LT hip fracture. She was admitted for further management. Underwent IM nail 3/23    3/25: Pt seen and examined this am. Felt uncomfortable, tired. No f/c/r. Tolerating po. No acute overnight events.     Review of system- All 10 systems reviewed and is as per HPI otherwise negative.     Vital Signs Last 24 Hrs  T(C): 36.9 (24 Mar 2018 23:23), Max: 36.9 (24 Mar 2018 23:23)  T(F): 98.4 (24 Mar 2018 23:23), Max: 98.4 (24 Mar 2018 23:23)  HR: 89 (25 Mar 2018 11:37) (89 - 102)  BP: 121/64 (24 Mar 2018 23:23) (121/64 - 126/60)  RR: 18 (24 Mar 2018 23:23) (18 - 18)  SpO2: 73% (25 Mar 2018 11:37) (73% - 96%)  PHYSICAL EXAM:    GENERAL: Comfortable, no acute distress  HEAD:  Atraumatic, Normocephalic  EYES: EOMI, PERRLA  HEENT: moist mucous membranes  NECK: Supple, No JVD  NERVOUS SYSTEM:  non focal, confused  CHEST/LUNG: Clear to auscultation bilaterally  HEART: S1,S2+, Regular rate and rhythm;   ABDOMEN: Soft, Nontender, Nondistended; Bowel sounds present  GENITOURINARY- Voiding, no palpable bladder  EXTREMITIES:  No clubbing, cyanosis, or edema  MUSCULOSKELETAL- left hip dressing intact  SKIN-no rash  LABS:                        9.3    10.81 )-----------( 231      ( 25 Mar 2018 06:22 )             29.5     03-25    139  |  102  |  25<H>  ----------------------------<  125<H>  4.0   |  30  |  0.48<L>    Ca    8.4<L>      25 Mar 2018 06:22    MEDICATIONS  (STANDING):  ALBUTerol    0.083% 2.5 milliGRAM(s) Nebulizer every 6 hours  ALBUTerol    90 MICROgram(s) HFA Inhaler 1 Puff(s) Inhalation every 4 hours  ascorbic acid 500 milliGRAM(s) Oral two times a day  aspirin  chewable 81 milliGRAM(s) Oral daily  atorvastatin 40 milliGRAM(s) Oral at bedtime  buDESOnide   0.25 milliGRAM(s) Respule 0.25 milliGRAM(s) Inhalation every 12 hours  docusate sodium 100 milliGRAM(s) Oral three times a day  enoxaparin Injectable 40 milliGRAM(s) SubCutaneous daily  ferrous    sulfate 325 milliGRAM(s) Oral three times a day with meals  folic acid 1 milliGRAM(s) Oral daily  guaiFENesin  milliGRAM(s) Oral every 12 hours  lactated ringers. 1000 milliLiter(s) (75 mL/Hr) IV Continuous <Continuous>  levothyroxine 100 MICROGram(s) Oral daily  mirtazapine 30 milliGRAM(s) Oral at bedtime  multivitamin 1 Tablet(s) Oral daily  OLANZapine 5 milliGRAM(s) Oral at bedtime  sodium chloride 0.9%. 1000 milliLiter(s) (100 mL/Hr) IV Continuous <Continuous>    MEDICATIONS  (PRN):  acetaminophen   Tablet 650 milliGRAM(s) Oral every 6 hours PRN For Temp greater than 38 C (100.4 F) or headache  benzocaine 15 mG/menthol 3.6 mG Lozenge 1 Lozenge Oral every 3 hours PRN Sore Throat  diphenhydrAMINE   Capsule 25 milliGRAM(s) Oral every 6 hours PRN Rash and/or Itching  ondansetron Injectable 4 milliGRAM(s) IV Push every 6 hours PRN Nausea and/or Vomiting  oxyCODONE    IR 5 milliGRAM(s) Oral every 4 hours PRN Moderate Pain (4 - 6)  zaleplon 5 milliGRAM(s) Oral at bedtime PRN Insomnia    ASSESSMENT AND PLAN:  84yo/F with PMH COPD, hypothyroidism, hyperlipidemia, psychiatric history on xyprexa, dementia who is admitted with left hip fracture    1. S/p mechanical fall with LT hip fracture  -s/p IM nail pod#2  -pain control  -physical therapy  -incentive spirometry    2. Leukocytosis:  -Afebrile  -cxr neg  -u/a neg for uti  -likely reactive, improving      3. COPD:  -per d/w rn, patient gets more agitated/worked up with albuterol  -no wheeze on exam  -dc albuterol  -will order advair/spiriva    4. Hypothyroidism:  -synthroid    5. HLD:  -statin    6. DVT px.    7. dispo:  dc planning to [facility] likely tomorrow

## 2018-03-25 NOTE — PROGRESS NOTE ADULT - SUBJECTIVE AND OBJECTIVE BOX
HPI  HPI:  86yo/F with PMH COPD, hypothyroidism, hyperlipidemia presented s/p mechanical fall earlier today. She normally walks with the walker, she did not take one with her when she went to the bathroom and she lost her balance and fell, sustaining LT hip fracture. She denies LOC. (22 Mar 2018 15:59)      Patient is a 85y old  Female who presents with a chief complaint of s/p mechanical fall (23 Mar 2018 19:59) is now s/p IM nail 3.23 with Dr. Mireles.      Consulted by Dr. Mireles for VTE prophylaxis, risk stratification, and anticoagulation management.    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 Awake, alert, and appropriate. Discussed need for anticoagulation given fracture and repair and high risk. Patient verbalized understanding. Reports diet as "bad"- only admits to eating breakfast with very little PO intake otherwise.   3/25: Patient seen at bedside with some confusion and irritability- denying pain. Responding appropriately to questions.     FAMILY HISTORY:  Family history of lung cancer (Sibling)  Family history of stomach cancer (Sibling)    Denies any personal or familial history of clotting or bleeding disorders.    Allergies    No Known Allergies    Intolerances    REVIEW OF SYSTEMS    (  )Fever	     (  )Constipation	(  )SOB				(  )Headache	(  )Dysuria  (  )Chills	     (  )Melena	(  )Dyspnea present on exertion	                    (  )Dizziness                    (  )Polyuria  (  )Nausea	     (  )Hematochezia	(  )Cough			                    (  )Syncope   	(  )Hematuria  (  )Vomiting    (  )Chest Pain	(  )Wheezing			(  )Weakness  (  )Diarrhea     (  )Palpitations	(  )Anorexia			(  )Myalgia    All other review of systems negative: Yes    PHYSICAL EXAM:    Constitutional: Appears pale    Neurological: A& O x 2    Skin: Warm    Respiratory and Thorax: normal effort; Breath sounds: decreased with rhonchi b/l + nonprod cough  	  Cardiovascular: S1, S2, regular, NMBR	    Gastrointestinal: BS + x 4Q, nontender	    Genitourinary:  Bladder nondistended, nontender    Musculoskeletal:   General Right:   no muscle/joint tenderness,   normal tone, no joint swelling,   ROM: full	    General Left:   + muscle/joint tenderness,   normal tone, no joint swelling,   ROM: limited    Hip:  Left: Dressing CDI; aquacels x 3    Lower extrems:   Right: no calf tenderness              negative yasmany's sign               + pedal pulses    Left:   no calf tenderness              negative yasmany's sign               + pedal pulses                                9.3    10.81 )-----------( 231      ( 25 Mar 2018 06:22 )             29.5       03-25    139  |  102  |  25<H>  ----------------------------<  125<H>  4.0   |  30  |  0.48<L>    Ca    8.4<L>      25 Mar 2018 06:22                              9.6    12.88 )-----------( 255      ( 24 Mar 2018 05:42 )             30.9       03-24    140  |  103  |  25<H>  ----------------------------<  103<H>  4.5   |  30  |  0.45<L>    Ca    8.6      24 Mar 2018 05:42    TPro  7.4  /  Alb  2.3<L>  /  TBili  0.5  /  DBili  x   /  AST  18  /  ALT  21  /  AlkPhos  137<H>  03-22      PT/INR - ( 23 Mar 2018 04:33 )   PT: 13.8 sec;   INR: 1.27 ratio         PTT - ( 23 Mar 2018 04:33 )  PTT:28.1 sec				    MEDICATIONS  (STANDING):  ALBUTerol    0.083% 2.5 milliGRAM(s) Nebulizer every 6 hours  ALBUTerol    90 MICROgram(s) HFA Inhaler 1 Puff(s) Inhalation every 4 hours  ascorbic acid 500 milliGRAM(s) Oral two times a day  atorvastatin 40 milliGRAM(s) Oral at bedtime  buDESOnide   0.25 milliGRAM(s) Respule 0.25 milliGRAM(s) Inhalation every 12 hours  docusate sodium 100 milliGRAM(s) Oral three times a day  enoxaparin Injectable 40 milliGRAM(s) SubCutaneous daily  ferrous    sulfate 325 milliGRAM(s) Oral three times a day with meals  folic acid 1 milliGRAM(s) Oral daily  guaiFENesin  milliGRAM(s) Oral every 12 hours  lactated ringers. 1000 milliLiter(s) IV Continuous <Continuous>  levothyroxine 100 MICROGram(s) Oral daily  mirtazapine 30 milliGRAM(s) Oral at bedtime  multivitamin 1 Tablet(s) Oral daily  OLANZapine 5 milliGRAM(s) Oral at bedtime  sodium chloride 0.9%. 1000 milliLiter(s) IV Continuous <Continuous>      Vital Signs Last 24 Hrs  T(C): 36.9 (03-24-18 @ 23:23), Max: 36.9 (03-24-18 @ 23:23)  T(F): 98.4 (03-24-18 @ 23:23), Max: 98.4 (03-24-18 @ 23:23)  HR: 102 (03-24-18 @ 23:23) (96 - 102)  BP: 121/64 (03-24-18 @ 23:23) (121/64 - 126/60)  BP(mean): --  RR: 18 (03-24-18 @ 23:23) (18 - 18)  SpO2: 92% (03-24-18 @ 23:23) (92% - 96%)    DVT Prophylaxis:  LMWH                   (x  )  Heparin SQ           (  )  Coumadin             (  )  Xarelto                  (  )  Eliquis                   (  )  Venodynes           (x  )  Ambulation          ( x )  UFH                       (  )  Contraindicated  (  )

## 2018-03-25 NOTE — PROGRESS NOTE ADULT - ASSESSMENT
A/P: 85y Female s/p L IMN POD 2, and L4 VCF    Pain control  DVT ppx  PT/OT, WBAT  WBAT for L4 vcf  FU labs  FU LSO brace  Dispo planning likely to rehab in 1-2 days

## 2018-03-25 NOTE — PROGRESS NOTE ADULT - ASSESSMENT
This is an 85 year old female s/p IM nail with high risk for VTE due to current pulmonary function, surgery, fracture, and immobility. Moderate bleeding risk due to age.    Explained the risk vs benefit of AC to prevent VTE with patient. Verbalized agreement with LMWH daily a one month period.    Plan:  ::Lovenox 40 mg SQ daily x 4 weeks total  ::Resume ASA 81 mg PO daily   ::Daily CBC/BMP  ::Venodynes b/l  ::Enc ambulation    Will continue to follow.

## 2018-03-25 NOTE — CONSULT NOTE ADULT - SUBJECTIVE AND OBJECTIVE BOX
HPI:  86yo/F with PMH COPD, hypothyroidism, hyperlipidemia presented s/p mechanical fall. She normally walks with the walker, she did not take one with her when she went to the bathroom and she lost her balance and fell, sustaining LT hip fracture. Patient is s/p hip pinning. Sha has a hx COPD on home O2. She has 60+ pack year smoking history. Mrs. Kong has been desaturating off Os. Cxr shows non acute infiltrates. Previous CT scan chest shows severe COPD. According to daughter, patient becomes agitated after albuterol neb.      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  Hypothyroidism, unspecified type  Shortening, leg, congenital, right  No significant past surgical history      MEDICATIONS  (STANDING):  ALBUTerol    0.083% 2.5 milliGRAM(s) Nebulizer every 6 hours  ALBUTerol    90 MICROgram(s) HFA Inhaler 1 Puff(s) Inhalation every 4 hours  ascorbic acid 500 milliGRAM(s) Oral two times a day  aspirin  chewable 81 milliGRAM(s) Oral daily  atorvastatin 40 milliGRAM(s) Oral at bedtime  buDESOnide   0.25 milliGRAM(s) Respule 0.25 milliGRAM(s) Inhalation every 12 hours  buDESOnide 160 MICROgram(s)/formoterol 4.5 MICROgram(s) Inhaler 2 Puff(s) Inhalation two times a day  docusate sodium 100 milliGRAM(s) Oral three times a day  enoxaparin Injectable 40 milliGRAM(s) SubCutaneous daily  ferrous    sulfate 325 milliGRAM(s) Oral three times a day with meals  folic acid 1 milliGRAM(s) Oral daily  guaiFENesin  milliGRAM(s) Oral every 12 hours  lactated ringers. 1000 milliLiter(s) (75 mL/Hr) IV Continuous <Continuous>  levothyroxine 100 MICROGram(s) Oral daily  mirtazapine 30 milliGRAM(s) Oral at bedtime  multivitamin 1 Tablet(s) Oral daily  OLANZapine 5 milliGRAM(s) Oral at bedtime  tiotropium 18 MICROgram(s) Capsule 1 Capsule(s) Inhalation daily    MEDICATIONS  (PRN):  acetaminophen   Tablet 650 milliGRAM(s) Oral every 6 hours PRN For Temp greater than 38 C (100.4 F) or headache  benzocaine 15 mG/menthol 3.6 mG Lozenge 1 Lozenge Oral every 3 hours PRN Sore Throat  diphenhydrAMINE   Capsule 25 milliGRAM(s) Oral every 6 hours PRN Rash and/or Itching  ondansetron Injectable 4 milliGRAM(s) IV Push every 6 hours PRN Nausea and/or Vomiting  oxyCODONE    IR 5 milliGRAM(s) Oral every 4 hours PRN Moderate Pain (4 - 6)  zaleplon 5 milliGRAM(s) Oral at bedtime PRN Insomnia      Allergies    No Known Allergies    Intolerances        SOCIAL HISTORY: Denies tobacco, etoh abuse or illicit drug use    FAMILY HISTORY:  Family history of lung cancer (Sibling)  Family history of stomach cancer (Sibling)      Vital Signs Last 24 Hrs  T(C): 36.9 (24 Mar 2018 23:23), Max: 36.9 (24 Mar 2018 23:23)  T(F): 98.4 (24 Mar 2018 23:23), Max: 98.4 (24 Mar 2018 23:23)  HR: 89 (25 Mar 2018 11:37) (89 - 102)  BP: 121/64 (24 Mar 2018 23:23) (121/64 - 125/67)  BP(mean): --  RR: 18 (24 Mar 2018 23:23) (18 - 18)  SpO2: 95% (25 Mar 2018 11:55) (73% - 96%)    REVIEW OF SYSTEMS:    CONSTITUTIONAL:  As per HPI.  SKIN: no rashes  HEENT:  Eyes:  No diplopia or blurred vision. ENT:  No earache, sore throat or runny nose.  CARDIOVASCULAR:  No pressure, squeezing, tightness, heaviness or aching about the chest, neck, axilla or epigastrium.  RESPIRATORY:  No cough, shortness of breath, PND or orthopnea.  GASTROINTESTINAL:  No nausea, vomiting or diarrhea.  GENITOURINARY:  No dysuria, frequency or urgency.  MUSCULOSKELETAL:  As per HPI.  SKIN:  No change in skin, hair or nails.  NEUROLOGIC:  No paresthesias, fasciculations, seizures or weakness.  PSYCHIATRIC:  No disorder of thought or mood.  ENDOCRINE:  No heat or cold intolerance, polyuria or polydipsia.  HEMATOLOGICAL:  No easy bruising or bleedings:  .     PHYSICAL EXAMINATION:    GENERAL APPEARANCE:  Pt. is not currently dyspneic, in no distress. Pt. is alert, oriented, and pleasant.  HEENT:  Pupils are normal and react normally. No icterus. Mucous membranes well colored.  NECK:  Supple. No lymphadenopathy. Jugular venous pressure not elevated. Carotids equal.   HEART:  S1S2 reg w 2/6 systolic murmur  CHEST:  bilat ronchi  ABDOMEN:  Soft and nontender.   EXTREMITIES:  There is no cyanosis, clubbing or edema.   SKIN:  No rash or significant lesions are noted.  CNS: AAO x 3    LABS:                        9.3    10.81 )-----------( 231      ( 25 Mar 2018 06:22 )             29.5     03-25    139  |  102  |  25<H>  ----------------------------<  125<H>  4.0   |  30  |  0.48<L>    Ca    8.4<L>      25 Mar 2018 06:22                    RADIOLOGY & ADDITIONAL STUDIES:

## 2018-03-25 NOTE — PROGRESS NOTE ADULT - SUBJECTIVE AND OBJECTIVE BOX
Patient seen and examined. Pain controlled. No acute events overnight    Vital Signs Last 24 Hrs  T(C): 36.9 (03-24-18 @ 23:23), Max: 36.9 (03-24-18 @ 23:23)  T(F): 98.4 (03-24-18 @ 23:23), Max: 98.4 (03-24-18 @ 23:23)  HR: 102 (03-24-18 @ 23:23) (96 - 102)  BP: 121/64 (03-24-18 @ 23:23) (121/64 - 126/60)  BP(mean): --  RR: 18 (03-24-18 @ 23:23) (18 - 18)  SpO2: 92% (03-24-18 @ 23:23) (92% - 96%)    Physical Exam  Gen: NAD  LLE:   Dressing c/d/i  +EHL/FHL/Gsc/TA  SILT L3-S1  DP +  Compartments soft  No calf ttp    spine:  motor intact throughout  silt throughout  neg clonus

## 2018-03-26 ENCOUNTER — APPOINTMENT (OUTPATIENT)
Dept: INTERNAL MEDICINE | Facility: CLINIC | Age: 83
End: 2018-03-26

## 2018-03-26 LAB
ANION GAP SERPL CALC-SCNC: 5 MMOL/L — SIGNIFICANT CHANGE UP (ref 5–17)
BUN SERPL-MCNC: 23 MG/DL — SIGNIFICANT CHANGE UP (ref 7–23)
CALCIUM SERPL-MCNC: 8.3 MG/DL — LOW (ref 8.5–10.1)
CHLORIDE SERPL-SCNC: 102 MMOL/L — SIGNIFICANT CHANGE UP (ref 96–108)
CO2 SERPL-SCNC: 33 MMOL/L — HIGH (ref 22–31)
CREAT SERPL-MCNC: 0.44 MG/DL — LOW (ref 0.5–1.3)
GLUCOSE SERPL-MCNC: 113 MG/DL — HIGH (ref 70–99)
HCT VFR BLD CALC: 27.7 % — LOW (ref 34.5–45)
HGB BLD-MCNC: 8.7 G/DL — LOW (ref 11.5–15.5)
MCHC RBC-ENTMCNC: 26.9 PG — LOW (ref 27–34)
MCHC RBC-ENTMCNC: 31.4 GM/DL — LOW (ref 32–36)
MCV RBC AUTO: 85.8 FL — SIGNIFICANT CHANGE UP (ref 80–100)
NRBC # BLD: 0 /100 WBCS — SIGNIFICANT CHANGE UP (ref 0–0)
PLATELET # BLD AUTO: 239 K/UL — SIGNIFICANT CHANGE UP (ref 150–400)
POTASSIUM SERPL-MCNC: 4.1 MMOL/L — SIGNIFICANT CHANGE UP (ref 3.5–5.3)
POTASSIUM SERPL-SCNC: 4.1 MMOL/L — SIGNIFICANT CHANGE UP (ref 3.5–5.3)
RBC # BLD: 3.23 M/UL — LOW (ref 3.8–5.2)
RBC # FLD: 13.8 % — SIGNIFICANT CHANGE UP (ref 10.3–14.5)
SODIUM SERPL-SCNC: 140 MMOL/L — SIGNIFICANT CHANGE UP (ref 135–145)
WBC # BLD: 10.63 K/UL — HIGH (ref 3.8–10.5)
WBC # FLD AUTO: 10.63 K/UL — HIGH (ref 3.8–10.5)

## 2018-03-26 PROCEDURE — 99233 SBSQ HOSP IP/OBS HIGH 50: CPT

## 2018-03-26 PROCEDURE — 71045 X-RAY EXAM CHEST 1 VIEW: CPT | Mod: 26

## 2018-03-26 RX ORDER — MORPHINE SULFATE 50 MG/1
1 CAPSULE, EXTENDED RELEASE ORAL ONCE
Qty: 0 | Refills: 0 | Status: DISCONTINUED | OUTPATIENT
Start: 2018-03-26 | End: 2018-03-26

## 2018-03-26 RX ORDER — ENOXAPARIN SODIUM 100 MG/ML
40 INJECTION SUBCUTANEOUS
Qty: 0 | Refills: 0 | COMMUNITY
Start: 2018-03-26 | End: 2018-04-20

## 2018-03-26 RX ORDER — CEFEPIME 1 G/1
1000 INJECTION, POWDER, FOR SOLUTION INTRAMUSCULAR; INTRAVENOUS ONCE
Qty: 0 | Refills: 0 | Status: COMPLETED | OUTPATIENT
Start: 2018-03-26 | End: 2018-03-26

## 2018-03-26 RX ORDER — ACETAMINOPHEN 500 MG
2 TABLET ORAL
Qty: 0 | Refills: 0 | COMMUNITY
Start: 2018-03-26

## 2018-03-26 RX ORDER — VANCOMYCIN HCL 1 G
1000 VIAL (EA) INTRAVENOUS ONCE
Qty: 0 | Refills: 0 | Status: COMPLETED | OUTPATIENT
Start: 2018-03-26 | End: 2018-03-26

## 2018-03-26 RX ORDER — VANCOMYCIN HCL 1 G
VIAL (EA) INTRAVENOUS
Qty: 0 | Refills: 0 | Status: DISCONTINUED | OUTPATIENT
Start: 2018-03-26 | End: 2018-03-27

## 2018-03-26 RX ORDER — VANCOMYCIN HCL 1 G
1000 VIAL (EA) INTRAVENOUS EVERY 12 HOURS
Qty: 0 | Refills: 0 | Status: DISCONTINUED | OUTPATIENT
Start: 2018-03-27 | End: 2018-03-27

## 2018-03-26 RX ORDER — OXYCODONE HYDROCHLORIDE 5 MG/1
1 TABLET ORAL
Qty: 0 | Refills: 0 | COMMUNITY
Start: 2018-03-26

## 2018-03-26 RX ORDER — CEFEPIME 1 G/1
INJECTION, POWDER, FOR SOLUTION INTRAMUSCULAR; INTRAVENOUS
Qty: 0 | Refills: 0 | Status: DISCONTINUED | OUTPATIENT
Start: 2018-03-26 | End: 2018-03-30

## 2018-03-26 RX ORDER — CEFEPIME 1 G/1
1000 INJECTION, POWDER, FOR SOLUTION INTRAMUSCULAR; INTRAVENOUS EVERY 12 HOURS
Qty: 0 | Refills: 0 | Status: DISCONTINUED | OUTPATIENT
Start: 2018-03-26 | End: 2018-03-30

## 2018-03-26 RX ORDER — FUROSEMIDE 40 MG
20 TABLET ORAL ONCE
Qty: 0 | Refills: 0 | Status: COMPLETED | OUTPATIENT
Start: 2018-03-26 | End: 2018-03-26

## 2018-03-26 RX ADMIN — Medication 600 MILLIGRAM(S): at 18:05

## 2018-03-26 RX ADMIN — Medication 100 MILLIGRAM(S): at 22:18

## 2018-03-26 RX ADMIN — Medication 325 MILLIGRAM(S): at 12:26

## 2018-03-26 RX ADMIN — Medication 81 MILLIGRAM(S): at 12:26

## 2018-03-26 RX ADMIN — Medication 325 MILLIGRAM(S): at 18:05

## 2018-03-26 RX ADMIN — Medication 20 MILLIGRAM(S): at 23:33

## 2018-03-26 RX ADMIN — Medication 100 MICROGRAM(S): at 04:16

## 2018-03-26 RX ADMIN — Medication 1 TABLET(S): at 12:29

## 2018-03-26 RX ADMIN — ENOXAPARIN SODIUM 40 MILLIGRAM(S): 100 INJECTION SUBCUTANEOUS at 12:27

## 2018-03-26 RX ADMIN — MORPHINE SULFATE 1 MILLIGRAM(S): 50 CAPSULE, EXTENDED RELEASE ORAL at 22:55

## 2018-03-26 RX ADMIN — CEFEPIME 100 MILLIGRAM(S): 1 INJECTION, POWDER, FOR SOLUTION INTRAMUSCULAR; INTRAVENOUS at 12:30

## 2018-03-26 RX ADMIN — Medication 1 MILLIGRAM(S): at 12:26

## 2018-03-26 RX ADMIN — Medication 600 MILLIGRAM(S): at 04:17

## 2018-03-26 RX ADMIN — MIRTAZAPINE 30 MILLIGRAM(S): 45 TABLET, ORALLY DISINTEGRATING ORAL at 22:15

## 2018-03-26 RX ADMIN — Medication 0.25 MILLIGRAM(S): at 20:10

## 2018-03-26 RX ADMIN — ATORVASTATIN CALCIUM 40 MILLIGRAM(S): 80 TABLET, FILM COATED ORAL at 22:18

## 2018-03-26 RX ADMIN — TIOTROPIUM BROMIDE 1 CAPSULE(S): 18 CAPSULE ORAL; RESPIRATORY (INHALATION) at 10:44

## 2018-03-26 RX ADMIN — Medication 500 MICROGRAM(S): at 22:59

## 2018-03-26 RX ADMIN — Medication 500 MILLIGRAM(S): at 18:05

## 2018-03-26 RX ADMIN — Medication 500 MICROGRAM(S): at 20:09

## 2018-03-26 RX ADMIN — Medication 650 MILLIGRAM(S): at 04:17

## 2018-03-26 RX ADMIN — OLANZAPINE 5 MILLIGRAM(S): 15 TABLET, FILM COATED ORAL at 22:15

## 2018-03-26 RX ADMIN — Medication 100 MILLIGRAM(S): at 13:18

## 2018-03-26 RX ADMIN — Medication 250 MILLIGRAM(S): at 23:21

## 2018-03-26 RX ADMIN — Medication 500 MICROGRAM(S): at 15:31

## 2018-03-26 RX ADMIN — Medication 500 MICROGRAM(S): at 10:44

## 2018-03-26 NOTE — PROGRESS NOTE ADULT - SUBJECTIVE AND OBJECTIVE BOX
HPI:  86yo/F with PMH COPD, hypothyroidism, hyperlipidemia presented s/p mechanical fall earlier today. She normally walks with the walker, she did not take one with her when she went to the bathroom and she lost her balance and fell, sustaining LT hip fracture. She denies LOC. (22 Mar 2018 15:59)      Patient is a 85y old  Female who presents with a chief complaint of s/p mechanical fall (23 Mar 2018 19:59) is now s/p IM nail 3.23 with Dr. Mireles.      Consulted by Dr. Mireles for VTE prophylaxis, risk stratification, and anticoagulation management.    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 Awake, alert, and appropriate. Discussed need for anticoagulation given fracture and repair and high risk. Patient verbalized understanding. Reports diet as "bad"- only admits to eating breakfast with very little PO intake otherwise.   3/25: Patient seen at bedside with some confusion and irritability- denying pain. Responding appropriately to questions.   3-26-18 Pt seen at bedside knows her name and that she is in the hospital. but not what hospital.   Informed her that she is on lovenox.  Pt states she feels weak.   FAMILY HISTORY:  Family history of lung cancer (Sibling)  Family history of stomach cancer (Sibling)    Denies any personal or familial history of clotting or bleeding disorders.    Allergies    No Known Allergies    Intolerances    REVIEW OF SYSTEMS    (  )Fever	     (  )Constipation	(  )SOB				(  )Headache	(  )Dysuria  (  )Chills	     (  )Melena	(  )Dyspnea present on exertion	                    (  )Dizziness                    (  )Polyuria  (  )Nausea	     (  )Hematochezia	(  )Cough			                    (  )Syncope   	(  )Hematuria  (  )Vomiting    (  )Chest Pain	(  )Wheezing			(  )Weakness  (  )Diarrhea     (  )Palpitations	(  )Anorexia			(  )Myalgia    All other review of systems negative: Yes    PHYSICAL EXAM:    Constitutional: Appears pale    Neurological: A& O x 2    Skin: Warm    Respiratory and Thorax: normal effort; Breath sounds: decreased with rhonchi b/l + nonprod cough  	  Cardiovascular: S1, S2, regular, NMBR	    Gastrointestinal: BS + x 4Q, nontender	    Genitourinary:  Bladder nondistended, nontender    Musculoskeletal:   General Right:   no muscle/joint tenderness,   normal tone, no joint swelling,   ROM: full	    General Left:   + muscle/joint tenderness,   normal tone, no joint swelling,   ROM: limited    Hip:  Left: Dressing CDI; aquacels    Lower extrems:   Right: no calf tenderness              negative yasmany's sign               + pedal pulses    Left:   no calf tenderness              negative yasmany's sign               + pedal pulses                              8.7    10.63 )-----------( 239      ( 26 Mar 2018 05:36 )             27.7       03-26    140  |  102  |  23  ----------------------------<  113<H>  4.1   |  33<H>  |  0.44<L>    Ca    8.3<L>      26 Mar 2018 05:36                                9.3    10.81 )-----------( 231      ( 25 Mar 2018 06:22 )             29.5       03-25    139  |  102  |  25<H>  ----------------------------<  125<H>  4.0   |  30  |  0.48<L>    Ca    8.4<L>      25 Mar 2018 06:22                              9.6    12.88 )-----------( 255      ( 24 Mar 2018 05:42 )             30.9       03-24    140  |  103  |  25<H>  ----------------------------<  103<H>  4.5   |  30  |  0.45<L>    Ca    8.6      24 Mar 2018 05:42    TPro  7.4  /  Alb  2.3<L>  /  TBili  0.5  /  DBili  x   /  AST  18  /  ALT  21  /  AlkPhos  137<H>  03-22      PT/INR - ( 23 Mar 2018 04:33 )   PT: 13.8 sec;   INR: 1.27 ratio         PTT - ( 23 Mar 2018 04:33 )  PTT:28.1 sec				    MEDICATIONS  (STANDING):  ascorbic acid 500 milliGRAM(s) Oral two times a day  aspirin  chewable 81 milliGRAM(s) Oral daily  atorvastatin 40 milliGRAM(s) Oral at bedtime  buDESOnide   0.25 milliGRAM(s) Respule 0.25 milliGRAM(s) Inhalation every 12 hours  buDESOnide 160 MICROgram(s)/formoterol 4.5 MICROgram(s) Inhaler 2 Puff(s) Inhalation two times a day  cefepime  IVPB      cefepime  IVPB 1000 milliGRAM(s) IV Intermittent once  cefepime  IVPB 1000 milliGRAM(s) IV Intermittent every 12 hours  docusate sodium 100 milliGRAM(s) Oral three times a day  enoxaparin Injectable 40 milliGRAM(s) SubCutaneous daily  ferrous    sulfate 325 milliGRAM(s) Oral three times a day with meals  folic acid 1 milliGRAM(s) Oral daily  guaiFENesin  milliGRAM(s) Oral every 12 hours  ipratropium    for Nebulization 500 MICROGram(s) Nebulizer every 6 hours  levothyroxine 100 MICROGram(s) Oral daily  mirtazapine 30 milliGRAM(s) Oral at bedtime  multivitamin 1 Tablet(s) Oral daily  OLANZapine 5 milliGRAM(s) Oral at bedtime  tiotropium 18 MICROgram(s) Capsule 1 Capsule(s) Inhalation daily        Vital Signs Last 24 Hrs  T(C): 36.7 (03-26-18 @ 11:11), Max: 36.9 (03-25-18 @ 16:58)  T(F): 98.1 (03-26-18 @ 11:11), Max: 98.5 (03-25-18 @ 16:58)  HR: 107 (03-26-18 @ 11:11) (92 - 107)  BP: 133/96 (03-26-18 @ 11:11) (121/63 - 133/96)  BP(mean): --  RR: 18 (03-26-18 @ 11:11) (18 - 18)  SpO2: 94% (03-26-18 @ 11:11) (94% - 96%))    DVT Prophylaxis:  LMWH                   (x  )  Heparin SQ           (  )  Coumadin             (  )  Xarelto                  (  )  Eliquis                   (  )  Venodynes           (x  )  Ambulation          ( x )  UFH                       (  )  Contraindicated  (  )

## 2018-03-26 NOTE — CHART NOTE - NSCHARTNOTEFT_GEN_A_CORE
Rapid response called @ ~ 10:40 PM for acute desaturation to 60s. Pt has recently diagnosed COPD and is on O2 @ home. She reported feeling SOB but after getting ventimask had O2 sat increase to 90s. After a few minutes of ventimask she reported feeling better. Pt had SBP in 140s and was a bit tachy w/ HR in low 100s    Vital Signs Last 24 Hrs  T(C): 37 (03-26-18 @ 17:04)  T(F): 98.6 (03-26-18 @ 17:04), Max: 98.6 (03-26-18 @ 17:04)  HR: 97 (03-26-18 @ 17:04) (76 - 107)  BP: 125/57 (03-26-18 @ 17:04)  BP(mean): --  RR: 18 (03-26-18 @ 17:04) (18 - 18)  SpO2: 97% (03-26-18 @ 17:04) (94% - 97%)  Wt(kg): --    03-26 @ 07:01  -  03-26 @ 22:53  --------------------------------------------------------  IN: 50 mL / OUT: 1 mL / NET: 49 mL    Physical exam  Gen - AOx3, mild-moderate respiratory distress  Card - RRR, tachycardic, clear S1/S2  Resp - no wheezing appreciated, + crackles  Extremities - no edema    A/P: 85F w/ acute desaturations improved w/ ventimask after albuterol treatment. Has signs of fluid overload (crackles in lungs) but also has newly diagnosed HCAP    added vanc for possible MRSA HCAP  ID consult  f/u stat CXR  Ordered 1x 20 IV lasix  Will follow    Discussed w/ intensivist and senior resident

## 2018-03-26 NOTE — PROVIDER CONTACT NOTE (CHANGE IN STATUS NOTIFICATION) - SITUATION
Pt w shortness of breath, awake/alert, o2 sat 68-71% on 3Lnc, R22, Bp139/59, respiratory called for respi tx, Rapid response called for desat

## 2018-03-26 NOTE — SWALLOW BEDSIDE ASSESSMENT ADULT - PHARYNGEAL PHASE
Swallow triggered in acceptable time frame for age. Laryngeal lift on palpation during swallow trials was slightly reduced but felt to be functional and grossly within age acceptable parameters, No behavioral aspiration signs exhibited on exam. Odynophagia was denied.

## 2018-03-26 NOTE — SWALLOW BEDSIDE ASSESSMENT ADULT - SWALLOW EVAL: RECOMMENDED DIET
NO OVERT OROPHARYNGEAL SWALLOWING CONTRAINDICATIONS WERE EVIDENT FOR BEING ON A REGULAR TEXTURE DIET WITH THIN LIQUID CONSISTENCIES AS SHE APPEARED TO TOLEARATE THESE FOOD TEXTURES FROM AN OROPHARYNGEAL SWALLOWING STANCE ON CLINICAL EXAM.

## 2018-03-26 NOTE — PROGRESS NOTE ADULT - ASSESSMENT
- RLL consolidation greater than LLL patchy findings on CTA of chest.  Treat for PNA (HCAP), ? aspiration.  Aspiration precautions, sputum C&S if able to raise, ID consult, Abx's per ID.    - cont O2  - discontinue albuterol  - continue atrovent neb and pulmicort  - dvt proph - RLL consolidation greater than LLL patchy findings on CTA of chest.  Treat for PNA (HCAP), ? aspiration.  Aspiration precautions, sputum C&S if able to raise, ID consult, Abx's per ID.  speech therapy consult for swallow eval (done).    - cont O2  - discontinue albuterol  - continue atrovent neb and pulmicort  - dvt proph - RLL consolidation greater than LLL patchy findings on CTA of chest.  Treat for PNA (HCAP), ? aspiration.  Aspiration precautions, sputum C&S if able to raise, ID consult, Abx's per ID.  speech therapy consult for swallow eval (ordered).    - cont O2  - discontinue albuterol  - continue atrovent neb and pulmicort  - dvt proph

## 2018-03-26 NOTE — SWALLOW BEDSIDE ASSESSMENT ADULT - SWALLOW EVAL: CRITERIA FOR SKILLED INTERVENTION MET
Acute speech path intervention is not clinically warranted nor do I feel it would , I feel that pt's Presbyphagia/Cognitive Dysfunction are chronic/pre-existing.  It is reiterated that speech path intervention would not be of any overt clinical benefit. Thus, WILL NOT ACTIVELY FOLLOW. RECONSULT PRN.

## 2018-03-26 NOTE — CDI QUERY NOTE - NSCDIRESPTXTBX_GEN_ALL_CORE_HH
Documentation reveals hx COPD on home O2.  Mrs. Kong has been desaturating off Os as well as Hypoxemia have been documented.  Noted  per O2 sat =84 % and 73% on RA    Can you please clarify if these findings demonstrate a diagnosis    A) Acute on chronic Respiratory Failure    B) Chronic Respiratory Failure   C) No Respiratory failure   D) Other, please specify    If so, please document so all diagnoses are reflected in record.  Thank you,

## 2018-03-26 NOTE — PROGRESS NOTE ADULT - SUBJECTIVE AND OBJECTIVE BOX
Patient seen and examined. Pain controlled. No acute events overnight. CT angio done yesterday negative for PE     Vital Signs Last 24 Hrs  T(C): 36.9 (03-24-18 @ 23:23), Max: 36.9 (03-24-18 @ 23:23)  T(F): 98.4 (03-24-18 @ 23:23), Max: 98.4 (03-24-18 @ 23:23)  HR: 102 (03-24-18 @ 23:23) (96 - 102)  BP: 121/64 (03-24-18 @ 23:23) (121/64 - 126/60)  BP(mean): --  RR: 18 (03-24-18 @ 23:23) (18 - 18)  SpO2: 92% (03-24-18 @ 23:23) (92% - 96%)    Physical Exam  Gen: NAD  LLE:   Dressing c/d/i  +EHL/FHL/Gsc/TA  SILT L3-S1  DP +  Compartments soft  No calf ttp    spine:  motor intact throughout  silt throughout  neg clonus

## 2018-03-26 NOTE — SWALLOW BEDSIDE ASSESSMENT ADULT - ORAL PHASE
Bolus formation/transfer were mildly prolonged but mechanically functional for age without evidence of an overt oral motor focality, Piecemeal deglutition was evident which is age expected. Pt was able to clear oral debris on own given mildly increased time.

## 2018-03-26 NOTE — SWALLOW BEDSIDE ASSESSMENT ADULT - ASR SWALLOW RECOMMEND DIAG
DEFER MBS AS PT APPEARED CLINICALLY TOLERANT OF SUGGESTED FOOD TEXTURES FROM AN OROPHARYNGEAL SWALLOWING EXAM ON CLINICAL EXAM AND CONSIDERING HER ALTERED MENTATION.

## 2018-03-26 NOTE — SWALLOW BEDSIDE ASSESSMENT ADULT - SWALLOW EVAL: FEEDING ASSISTANCE
PT WAS ABLE TO FEED SELF BUT HAS DEMENTIA AND HAS A TENDENCY TO BECOME CONFUSED AT TIMES. THUS, SHE MAY NEED ASSIST AT TIMES OF CONFUSION.

## 2018-03-26 NOTE — PROGRESS NOTE ADULT - SUBJECTIVE AND OBJECTIVE BOX
c/c: mechanical fall    HPI:  86yo/F with PMH COPD, hypothyroidism, hyperlipidemia, psychiatric history on zyprexa, dementia, recently came home from rehab,  who presented s/p mechanical fall.  She normally walks with the walker, she did not take one with her when she went to the bathroom and she lost her balance and fell, sustaining LT hip fracture. She was admitted for further management. Underwent IM nail 3/23    3/25: Pt seen and examined this am. Felt uncomfortable, tired. No f/c/r. Tolerating po. No acute overnight events.   3/26/18 c/o SOB, cough    Review of system- All 10 systems reviewed and is as per HPI otherwise negative.     Vital Signs Last 24 Hrs  T(C): 36.7 (26 Mar 2018 11:11), Max: 36.9 (25 Mar 2018 16:58)  T(F): 98.1 (26 Mar 2018 11:11), Max: 98.5 (25 Mar 2018 16:58)  HR: 107 (26 Mar 2018 11:11) (89 - 107)  BP: 133/96 (26 Mar 2018 11:11) (121/63 - 133/96)  BP(mean): --  RR: 18 (26 Mar 2018 11:11) (18 - 18)  SpO2: 94% (26 Mar 2018 11:11) (73% - 96%)    PHYSICAL EXAM:  GENERAL: Comfortable, no acute distress  HEAD:  Atraumatic, Normocephalic  EYES: EOMI, PERRLA  HEENT: moist mucous membranes  NECK: Supple, No JVD  NERVOUS SYSTEM:  non focal, confused  CHEST/LUNG: base rhonchi b/l  HEART: S1,S2+, Regular rate and rhythm;   ABDOMEN: Soft, Nontender, Nondistended; Bowel sounds present  GENITOURINARY- Voiding, no palpable bladder  EXTREMITIES:  No clubbing, cyanosis, or edema  MUSCULOSKELETAL- left hip dressing intact  SKIN-no rash    LABS:                        8.7    10.63 )-----------( 239      ( 26 Mar 2018 05:36 )             27.7     140    |  102    |  23     ----------------------------<  113    4.1     |  33     |  0.44     Ca    8.3        26 Mar 2018 05:36    Radiology:  EXAM:  CT ANGIO CHEST PE PROTOCOL IC                        PROCEDURE DATE:  03/25/2018    INTERPRETATION:  Clinical information: Dyspnea    TECHNIQUE:  Axial computed tomographic angiography images of the chest with   intravenous   contrast using pulmonary embolism protocol.     MIP reconstructed images were created and reviewed.  Coronal and sagittal reformatted images were created and reviewed.    CONTRAST:  90 mL of Omnipaque 350 administered intravenously with 10 cc discarded.    COMPARISON:  CT CHEST 2018-02-22 08:57    FINDINGS:  PULMONARY ARTERIES: Adequately opacified and no evidence of pulmonary   embolism.  AORTA: Moderate atherosclerotic arterial calcifications, including   coronary artery calcification. Normal caliberaorta.   LUNGS: Severe emphysema. Moderate sized consolidation at  the right lung base and mild patchy left lower lobe airspace disease.   Mild chronic lingular atelectasis.  PLEURAL SPACE: No pleural effusion or pneumothorax.  HEART: Heart is normalin size. No pericardial effusion.  BONES/JOINTS:  No acute fracture.  No dislocation. Moderate wedge   compression deformity at T7.  SOFT TISSUES:  Unremarkable.  LYMPH NODES:  Unremarkable.  No enlarged lymph nodes.  UPPER ABDOMEN: 1 cm nonobstructing right renal stone.    IMPRESSION:       No pulmonary embolism.  Bilateral lower lobe pneumonia, right side greater than left.  Severe emphysema.    MEDICATIONS  (STANDING):  ascorbic acid 500 milliGRAM(s) Oral two times a day  aspirin  chewable 81 milliGRAM(s) Oral daily  atorvastatin 40 milliGRAM(s) Oral at bedtime  buDESOnide   0.25 milliGRAM(s) Respule 0.25 milliGRAM(s) Inhalation every 12 hours  buDESOnide 160 MICROgram(s)/formoterol 4.5 MICROgram(s) Inhaler 2 Puff(s) Inhalation two times a day  cefepime  IVPB      cefepime  IVPB 1000 milliGRAM(s) IV Intermittent once  cefepime  IVPB 1000 milliGRAM(s) IV Intermittent every 12 hours  docusate sodium 100 milliGRAM(s) Oral three times a day  enoxaparin Injectable 40 milliGRAM(s) SubCutaneous daily  ferrous    sulfate 325 milliGRAM(s) Oral three times a day with meals  folic acid 1 milliGRAM(s) Oral daily  guaiFENesin  milliGRAM(s) Oral every 12 hours  ipratropium    for Nebulization 500 MICROGram(s) Nebulizer every 6 hours  levothyroxine 100 MICROGram(s) Oral daily  mirtazapine 30 milliGRAM(s) Oral at bedtime  multivitamin 1 Tablet(s) Oral daily  OLANZapine 5 milliGRAM(s) Oral at bedtime  tiotropium 18 MICROgram(s) Capsule 1 Capsule(s) Inhalation daily    MEDICATIONS  (PRN):  acetaminophen   Tablet 650 milliGRAM(s) Oral every 6 hours PRN For Temp greater than 38 C (100.4 F) or headache  benzocaine 15 mG/menthol 3.6 mG Lozenge 1 Lozenge Oral every 3 hours PRN Sore Throat  diphenhydrAMINE   Capsule 25 milliGRAM(s) Oral every 6 hours PRN Rash and/or Itching  ondansetron Injectable 4 milliGRAM(s) IV Push every 6 hours PRN Nausea and/or Vomiting  oxyCODONE    IR 5 milliGRAM(s) Oral every 4 hours PRN Moderate Pain (4 - 6)  zaleplon 5 milliGRAM(s) Oral at bedtime PRN Insomnia    ASSESSMENT AND PLAN:  #S/p mechanical fall with LT hip fracture s/p IMN/ anemia acute blood loss likely 2 to fracture  Ortho f/u appreciated  PT as tolerated  Monitor HH  Pain meds prn  Bowel regimen  Incentive spirometry  AC by Lovenox    #Pneumonia likely HCAP  Acute resp failure/hypoxia likely 2 to PNA  CT chest reviewed  Pulm eval appreciated  IV abx  ID eval for further IV abx management  Swallow eval to r/o aspiration    #COPD/chr resp failure on home O2  Pulm f/u appreciated  Nebs prn    #Hypothyroidism/hyperlipidemia  Stable    #Dispo- IV abx, PT. Not ready for SOPHIE yet

## 2018-03-26 NOTE — SWALLOW BEDSIDE ASSESSMENT ADULT - SWALLOW EVAL: DIAGNOSIS
1) The patient demonstrates mild functional Oropharyngeal Presbyphagia with presbyphagic features that may appear heightened at times when her dementia/COPD sequel exacerbate. With that being stated, the patient demonstrated sufficient oropharyngeal swallowing preservation for age(85) nonetheless on clinical exam. Note that while Presbyphagia may place pt at a relatively heightened risk for episodic aspiration, there were NO behavioral aspiration signs exhibited on exam.  2) Pt was alert and interactive but somewhat anxious as well as internally distractible at times. She could not be directed to structured communication tasks but she did sometimes verbally respond when asked a concrete personally relevant question. At these times, her motor speech abilities were felt to be functional, her speech output was intelligible and her utterances were linguistically intact, albeit somewhat brief/reflective of reduced insight c/w with baseline Cognitive Dysfunction associated with dementia. 1) The patient demonstrates mild functional Oropharyngeal Presbyphagia with presbyphagic features that may appear heightened at times when her dementia/COPD sequel exacerbate. With that being stated, the patient demonstrated sufficient oropharyngeal swallowing preservation for age(85) nonetheless on clinical exam. Note that while Presbyphagia may place pt at a relatively heightened risk for episodic aspiration, there were NO behavioral aspiration signs exhibited on exam.  2) Pt was alert and interactive but somewhat anxious as well as internally distractible at times. She could not be directed to structured communication tasks but she did sometimes verbally respond when asked  concrete personally relevant questions. At these times, her motor speech abilities were felt to be functional, her speech output was intelligible and her utterances were linguistically intact, albeit somewhat brief/reflective of reduced insight c/w baseline Cognitive Dysfunction associated with dementia.

## 2018-03-26 NOTE — PROGRESS NOTE ADULT - ASSESSMENT
A/P: 85y Female s/p L IMN POD 3, and L4 VCF    Pain control  DVT ppx  PT/OT, WBAT  WBAT for L4 vcf  FU labs  FU LSO brace  ortho stable  no acute orthopedic intervention at this time  FU with Dr. Mireles as outpatient 1-2 weeks after discharge   Dispo planning likely to rehab in 1-2 days

## 2018-03-26 NOTE — SWALLOW BEDSIDE ASSESSMENT ADULT - COMMENTS
The patient was admitted to  status post fall. Hospital course is notable for left hip fracture status post IM nailing and development of left sided pneumonia. 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 This patient was admitted to  status post fall. Hospital course is notable for left hip fracture status post IM nailing and development of left sided pneumonia. 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

## 2018-03-26 NOTE — SWALLOW BEDSIDE ASSESSMENT ADULT - SWALLOW EVAL: SECRETION MANAGEMENT
Adequate on exam. Note that no drooling was noted and strength of volitional cough was grossly functional for age, albeit somewhat moist.

## 2018-03-26 NOTE — SWALLOW BEDSIDE ASSESSMENT ADULT - ASPIRATION PRECAUTIONS
yes/MAINTAIN ASPIRATION PRECAUTIONS AS A CONSERVATIVE MEASURE. NOTE THAT WHILE PRESBYPHAGIA MAY PLACE PATIENT AT A RELATIVELY HEIGHTENED RISK FOR EPISODIC ASPIRATION, THERE WERE NO BEHAVIORAL ASPIRATION SIGNS DEMONSTRATED ON CLINICAL EXAM.

## 2018-03-26 NOTE — SWALLOW BEDSIDE ASSESSMENT ADULT - SLP GENERAL OBSERVATIONS
Pt seen at chairside. On encounter, pt was somewhat disheveled in appearance. A cervical kyphosis was evident. An occasional moist non nutritive cough was produced non nutritively but she was not in overt respiratory distress. Pt seen at chairside. On encounter, pt was somewhat disheveled in appearance. A cervical kyphosis was evident. An occasional moist non nutritive cough was produced non nutritively but she was not in overt respiratory distress. Pt was alert and interactive but somewhat anxious as well as internally distractible at times. She could not be directed to structured communication tasks but she did sometimes verbally respond when asked  concrete personally relevant questions. At these times, her motor speech abilities were felt to be functional, her speech output was intelligible and her utterances were linguistically intact, albeit somewhat brief/reflective of reduced insight c/w baseline Cognitive Dysfunction associated with dementia.

## 2018-03-26 NOTE — PROGRESS NOTE ADULT - SUBJECTIVE AND OBJECTIVE BOX
HPI:  84yo/F with PMH COPD, hypothyroidism, hyperlipidemia presented s/p mechanical fall. She normally walks with the walker, she did not take one with her when she went to the bathroom and she lost her balance and fell, sustaining LT hip fracture. Patient is s/p hip pinning. Sha has a hx COPD on home O2. She has 60+ pack year smoking history. Mrs. Kong has been desaturating off Os. Cxr shows non acute infiltrates. Previous CT scan chest shows severe COPD. According to daughter, patient becomes agitated after albuterol neb.    3/26: has cough, scant sputum.  CTA chest yesterday: no PE, positve RLL consolidation c/w PNA, ? aspiration.       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  Hypothyroidism, unspecified type  Shortening, leg, congenital, right  No significant past surgical history      MEDICATIONS  (STANDING):  ALBUTerol    0.083% 2.5 milliGRAM(s) Nebulizer every 6 hours  ALBUTerol    90 MICROgram(s) HFA Inhaler 1 Puff(s) Inhalation every 4 hours  ascorbic acid 500 milliGRAM(s) Oral two times a day  aspirin  chewable 81 milliGRAM(s) Oral daily  atorvastatin 40 milliGRAM(s) Oral at bedtime  buDESOnide   0.25 milliGRAM(s) Respule 0.25 milliGRAM(s) Inhalation every 12 hours  buDESOnide 160 MICROgram(s)/formoterol 4.5 MICROgram(s) Inhaler 2 Puff(s) Inhalation two times a day  docusate sodium 100 milliGRAM(s) Oral three times a day  enoxaparin Injectable 40 milliGRAM(s) SubCutaneous daily  ferrous    sulfate 325 milliGRAM(s) Oral three times a day with meals  folic acid 1 milliGRAM(s) Oral daily  guaiFENesin  milliGRAM(s) Oral every 12 hours  lactated ringers. 1000 milliLiter(s) (75 mL/Hr) IV Continuous <Continuous>  levothyroxine 100 MICROGram(s) Oral daily  mirtazapine 30 milliGRAM(s) Oral at bedtime  multivitamin 1 Tablet(s) Oral daily  OLANZapine 5 milliGRAM(s) Oral at bedtime  tiotropium 18 MICROgram(s) Capsule 1 Capsule(s) Inhalation daily    MEDICATIONS  (PRN):  acetaminophen   Tablet 650 milliGRAM(s) Oral every 6 hours PRN For Temp greater than 38 C (100.4 F) or headache  benzocaine 15 mG/menthol 3.6 mG Lozenge 1 Lozenge Oral every 3 hours PRN Sore Throat  diphenhydrAMINE   Capsule 25 milliGRAM(s) Oral every 6 hours PRN Rash and/or Itching  ondansetron Injectable 4 milliGRAM(s) IV Push every 6 hours PRN Nausea and/or Vomiting  oxyCODONE    IR 5 milliGRAM(s) Oral every 4 hours PRN Moderate Pain (4 - 6)  zaleplon 5 milliGRAM(s) Oral at bedtime PRN Insomnia      Allergies    No Known Allergies    Intolerances        SOCIAL HISTORY: Denies tobacco, etoh abuse or illicit drug use    FAMILY HISTORY:  Family history of lung cancer (Sibling)  Family history of stomach cancer (Sibling)      Vital Signs Last 24 Hrs  T(C): 36.9 (24 Mar 2018 23:23), Max: 36.9 (24 Mar 2018 23:23)  T(F): 98.4 (24 Mar 2018 23:23), Max: 98.4 (24 Mar 2018 23:23)  HR: 89 (25 Mar 2018 11:37) (89 - 102)  BP: 121/64 (24 Mar 2018 23:23) (121/64 - 125/67)  BP(mean): --  RR: 18 (24 Mar 2018 23:23) (18 - 18)  SpO2: 95% (25 Mar 2018 11:55) (73% - 96%)    REVIEW OF SYSTEMS:    CONSTITUTIONAL:  As per HPI.  SKIN: no rashes  HEENT:  Eyes:  No diplopia or blurred vision. ENT:  No earache, sore throat or runny nose.  CARDIOVASCULAR:  No pressure, squeezing, tightness, heaviness or aching about the chest, neck, axilla or epigastrium.  RESPIRATORY:  see above.  GASTROINTESTINAL:  No nausea, vomiting or diarrhea.  GENITOURINARY:  No dysuria, frequency or urgency.  MUSCULOSKELETAL:  As per HPI.  SKIN:  No change in skin, hair or nails.  NEUROLOGIC:  No paresthesias, fasciculations, seizures or weakness.  PSYCHIATRIC:  No disorder of thought or mood.  ENDOCRINE:  No heat or cold intolerance, polyuria or polydipsia.  HEMATOLOGICAL:  No easy bruising or bleedings:  .     PHYSICAL EXAMINATION:    GENERAL APPEARANCE:  Pt. is not currently dyspneic, in no distress. Pt. is alert, oriented, and pleasant.  HEENT:  Pupils are normal and react normally. No icterus. Mucous membranes well colored.  NECK:  Supple. No lymphadenopathy. Jugular venous pressure not elevated. Carotids equal.   HEART:  S1S2 reg w 2/6 systolic murmur  CHEST:  decreased aud BS's this AM.  ABDOMEN:  Soft and nontender.   EXTREMITIES:  There is no cyanosis, clubbing or edema.   SKIN:  No rash or significant lesions are noted.  CNS: AAO x 3    LABS:                        9.3    10.81 )-----------( 231      ( 25 Mar 2018 06:22 )             29.5     03-25    139  |  102  |  25<H>  ----------------------------<  125<H>  4.0   |  30  |  0.48<L>    Ca    8.4<L>      25 Mar 2018 06:22                    RADIOLOGY & ADDITIONAL STUDIES:     EXAM:  CT ANGIO CHEST PE PROTOCOL IC                            PROCEDURE DATE:  03/25/2018          INTERPRETATION:  Clinical information: Dyspnea    TECHNIQUE:  Axial computed tomographic angiography images of the chest with   intravenous   contrast using pulmonary embolism protocol.     MIP reconstructed images were created and reviewed.  Coronal and sagittal reformatted images were created and reviewed.    CONTRAST:  90 mL of Omnipaque 350 administered intravenously with 10 cc discarded.    COMPARISON:  CT CHEST 2018-02-22 08:57    FINDINGS:  PULMONARY ARTERIES: Adequately opacified and no evidence of pulmonary   embolism.  AORTA: Moderate atherosclerotic arterial calcifications, including   coronary artery calcification. Normal caliberaorta.   LUNGS: Severe emphysema. Moderate sized consolidation at  the right lung base and mild patchy left lower lobe airspace disease.   Mild chronic lingular atelectasis.  PLEURAL SPACE: No pleural effusion or pneumothorax.  HEART: Heart is normalin size. No pericardial effusion.  BONES/JOINTS:  No acute fracture.  No dislocation. Moderate wedge   compression deformity at T7.  SOFT TISSUES:  Unremarkable.  LYMPH NODES:  Unremarkable.  No enlarged lymph nodes.  UPPER ABDOMEN: 1 cm nonobstructing right renal stone.    IMPRESSION:       No pulmonary embolism.    Bilateral lower lobe pneumonia, right side greater than left.    Severe emphysema.

## 2018-03-27 DIAGNOSIS — J43.9 EMPHYSEMA, UNSPECIFIED: ICD-10-CM

## 2018-03-27 DIAGNOSIS — J18.9 PNEUMONIA, UNSPECIFIED ORGANISM: ICD-10-CM

## 2018-03-27 DIAGNOSIS — J44.9 CHRONIC OBSTRUCTIVE PULMONARY DISEASE, UNSPECIFIED: ICD-10-CM

## 2018-03-27 DIAGNOSIS — R06.02 SHORTNESS OF BREATH: ICD-10-CM

## 2018-03-27 LAB
ANION GAP SERPL CALC-SCNC: 6 MMOL/L — SIGNIFICANT CHANGE UP (ref 5–17)
BUN SERPL-MCNC: 21 MG/DL — SIGNIFICANT CHANGE UP (ref 7–23)
CALCIUM SERPL-MCNC: 8.3 MG/DL — LOW (ref 8.5–10.1)
CHLORIDE SERPL-SCNC: 96 MMOL/L — SIGNIFICANT CHANGE UP (ref 96–108)
CO2 SERPL-SCNC: 36 MMOL/L — HIGH (ref 22–31)
CREAT SERPL-MCNC: 0.5 MG/DL — SIGNIFICANT CHANGE UP (ref 0.5–1.3)
GLUCOSE SERPL-MCNC: 125 MG/DL — HIGH (ref 70–99)
HCT VFR BLD CALC: 28.2 % — LOW (ref 34.5–45)
HGB BLD-MCNC: 8.9 G/DL — LOW (ref 11.5–15.5)
MCHC RBC-ENTMCNC: 27.2 PG — SIGNIFICANT CHANGE UP (ref 27–34)
MCHC RBC-ENTMCNC: 31.6 GM/DL — LOW (ref 32–36)
MCV RBC AUTO: 86.2 FL — SIGNIFICANT CHANGE UP (ref 80–100)
NRBC # BLD: 0 /100 WBCS — SIGNIFICANT CHANGE UP (ref 0–0)
PLATELET # BLD AUTO: 269 K/UL — SIGNIFICANT CHANGE UP (ref 150–400)
POTASSIUM SERPL-MCNC: 4.1 MMOL/L — SIGNIFICANT CHANGE UP (ref 3.5–5.3)
POTASSIUM SERPL-SCNC: 4.1 MMOL/L — SIGNIFICANT CHANGE UP (ref 3.5–5.3)
RBC # BLD: 3.27 M/UL — LOW (ref 3.8–5.2)
RBC # FLD: 13.7 % — SIGNIFICANT CHANGE UP (ref 10.3–14.5)
SODIUM SERPL-SCNC: 138 MMOL/L — SIGNIFICANT CHANGE UP (ref 135–145)
WBC # BLD: 11.95 K/UL — HIGH (ref 3.8–10.5)
WBC # FLD AUTO: 11.95 K/UL — HIGH (ref 3.8–10.5)

## 2018-03-27 PROCEDURE — 99233 SBSQ HOSP IP/OBS HIGH 50: CPT

## 2018-03-27 RX ORDER — ALBUTEROL 90 UG/1
1 AEROSOL, METERED ORAL EVERY 4 HOURS
Qty: 0 | Refills: 0 | Status: DISCONTINUED | OUTPATIENT
Start: 2018-03-27 | End: 2018-03-30

## 2018-03-27 RX ORDER — VANCOMYCIN HCL 1 G
750 VIAL (EA) INTRAVENOUS EVERY 12 HOURS
Qty: 0 | Refills: 0 | Status: DISCONTINUED | OUTPATIENT
Start: 2018-03-27 | End: 2018-03-29

## 2018-03-27 RX ADMIN — ENOXAPARIN SODIUM 40 MILLIGRAM(S): 100 INJECTION SUBCUTANEOUS at 10:54

## 2018-03-27 RX ADMIN — OXYCODONE HYDROCHLORIDE 5 MILLIGRAM(S): 5 TABLET ORAL at 21:52

## 2018-03-27 RX ADMIN — Medication 40 MILLIGRAM(S): at 13:00

## 2018-03-27 RX ADMIN — Medication 325 MILLIGRAM(S): at 18:57

## 2018-03-27 RX ADMIN — Medication 1 MILLIGRAM(S): at 10:54

## 2018-03-27 RX ADMIN — Medication 500 MICROGRAM(S): at 01:57

## 2018-03-27 RX ADMIN — CEFEPIME 100 MILLIGRAM(S): 1 INJECTION, POWDER, FOR SOLUTION INTRAMUSCULAR; INTRAVENOUS at 02:22

## 2018-03-27 RX ADMIN — CEFEPIME 100 MILLIGRAM(S): 1 INJECTION, POWDER, FOR SOLUTION INTRAMUSCULAR; INTRAVENOUS at 23:02

## 2018-03-27 RX ADMIN — Medication 1 TABLET(S): at 10:55

## 2018-03-27 RX ADMIN — Medication 100 MILLIGRAM(S): at 10:54

## 2018-03-27 RX ADMIN — Medication 150.1 MILLIGRAM(S): at 10:53

## 2018-03-27 RX ADMIN — Medication 500 MICROGRAM(S): at 19:56

## 2018-03-27 RX ADMIN — Medication 81 MILLIGRAM(S): at 10:54

## 2018-03-27 RX ADMIN — Medication 600 MILLIGRAM(S): at 06:12

## 2018-03-27 RX ADMIN — Medication 500 MICROGRAM(S): at 09:00

## 2018-03-27 RX ADMIN — Medication 100 MICROGRAM(S): at 06:16

## 2018-03-27 RX ADMIN — Medication 100 MILLIGRAM(S): at 06:16

## 2018-03-27 RX ADMIN — Medication 325 MILLIGRAM(S): at 10:54

## 2018-03-27 RX ADMIN — ATORVASTATIN CALCIUM 40 MILLIGRAM(S): 80 TABLET, FILM COATED ORAL at 21:44

## 2018-03-27 RX ADMIN — Medication 600 MILLIGRAM(S): at 18:57

## 2018-03-27 RX ADMIN — OXYCODONE HYDROCHLORIDE 5 MILLIGRAM(S): 5 TABLET ORAL at 21:30

## 2018-03-27 RX ADMIN — Medication 500 MILLIGRAM(S): at 18:57

## 2018-03-27 RX ADMIN — CEFEPIME 100 MILLIGRAM(S): 1 INJECTION, POWDER, FOR SOLUTION INTRAMUSCULAR; INTRAVENOUS at 12:59

## 2018-03-27 RX ADMIN — MIRTAZAPINE 30 MILLIGRAM(S): 45 TABLET, ORALLY DISINTEGRATING ORAL at 21:44

## 2018-03-27 RX ADMIN — Medication 150.1 MILLIGRAM(S): at 21:44

## 2018-03-27 RX ADMIN — OLANZAPINE 5 MILLIGRAM(S): 15 TABLET, FILM COATED ORAL at 21:44

## 2018-03-27 RX ADMIN — Medication 100 MILLIGRAM(S): at 21:44

## 2018-03-27 RX ADMIN — Medication 500 MILLIGRAM(S): at 06:16

## 2018-03-27 RX ADMIN — Medication 500 MICROGRAM(S): at 14:04

## 2018-03-27 NOTE — PROGRESS NOTE ADULT - SUBJECTIVE AND OBJECTIVE BOX
HPI:  86yo/F with PMH COPD, hypothyroidism, hyperlipidemia presented s/p mechanical fall earlier today. She normally walks with the walker, she did not take one with her when she went to the bathroom and she lost her balance and fell, sustaining LT hip fracture. She denies LOC. (22 Mar 2018 15:59)      Patient is a 85y old  Female who presents with a chief complaint of s/p mechanical fall (23 Mar 2018 19:59) is now s/p IM nail 3.23 with Dr. Mireles.      Consulted by Dr. Mireles for VTE prophylaxis, risk stratification, and anticoagulation management.    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 Awake, alert, and appropriate. Discussed need for anticoagulation given fracture and repair and high risk. Patient verbalized understanding. Reports diet as "bad"- only admits to eating breakfast with very little PO intake otherwise.   3/25: Patient seen at bedside with some confusion and irritability- denying pain. Responding appropriately to questions.   3-26-18 Pt seen at bedside knows her name and that she is in the hospital. but not what hospital.   Informed her that she is on lovenox.  Pt states she feels weak.   3-27-18 Pt seen at bedside.  Pt was a rapid response yesterday due desaturation to the 60s.  Pt much better today o2 sat 100%.  Pt also has pnuemonia now being treated with antibiotics.   FAMILY HISTORY:  Family history of lung cancer (Sibling)  Family history of stomach cancer (Sibling)    Denies any personal or familial history of clotting or bleeding disorders.    Allergies    No Known Allergies    Intolerances    REVIEW OF SYSTEMS    (  )Fever	     (  )Constipation	(  )SOB				(  )Headache	(  )Dysuria  (  )Chills	     (  )Melena	(  )Dyspnea present on exertion	                    (  )Dizziness                    (  )Polyuria  (  )Nausea	     (  )Hematochezia	(  )Cough			                    (  )Syncope   	(  )Hematuria  (  )Vomiting    (  )Chest Pain	(  )Wheezing			(  )Weakness  (  )Diarrhea     (  )Palpitations	(  )Anorexia			(  )Myalgia    All other review of systems negative: Yes    PHYSICAL EXAM:    Constitutional: Appears pale    Neurological: A& O x 2    Skin: Warm    Respiratory and Thorax: normal effort; Breath sounds: decreased with rhonchi b/l + nonprod cough  	  Cardiovascular: S1, S2, regular, NMBR	    Gastrointestinal: BS + x 4Q, nontender	    Genitourinary:  Bladder nondistended, nontender    Musculoskeletal:   General Right:   no muscle/joint tenderness,   normal tone, no joint swelling,   ROM: full	    General Left:   + muscle/joint tenderness,   normal tone, no joint swelling,   ROM: limited    Hip:  Left: Dressing CDI; aquacels    Lower extrems:   Right: no calf tenderness              negative yasmany's sign               + pedal pulses    Left:   no calf tenderness              negative yasmany's sign               + pedal pulses                           8.9    11.95 )-----------( 269      ( 27 Mar 2018 07:52 )             28.2       03-27    138  |  96  |  21  ----------------------------<  125<H>  4.1   |  36<H>  |  0.50    Ca    8.3<L>      27 Mar 2018 05:35                                 8.7    10.63 )-----------( 239      ( 26 Mar 2018 05:36 )             27.7       03-26    140  |  102  |  23  ----------------------------<  113<H>  4.1   |  33<H>  |  0.44<L>    Ca    8.3<L>      26 Mar 2018 05:36                                9.3    10.81 )-----------( 231      ( 25 Mar 2018 06:22 )             29.5       03-25    139  |  102  |  25<H>  ----------------------------<  125<H>  4.0   |  30  |  0.48<L>    Ca    8.4<L>      25 Mar 2018 06:22                              9.6    12.88 )-----------( 255      ( 24 Mar 2018 05:42 )             30.9       03-24    140  |  103  |  25<H>  ----------------------------<  103<H>  4.5   |  30  |  0.45<L>    Ca    8.6      24 Mar 2018 05:42    TPro  7.4  /  Alb  2.3<L>  /  TBili  0.5  /  DBili  x   /  AST  18  /  ALT  21  /  AlkPhos  137<H>  03-22      PT/INR - ( 23 Mar 2018 04:33 )   PT: 13.8 sec;   INR: 1.27 ratio         PTT - ( 23 Mar 2018 04:33 )  PTT:28.1 sec				    MEDICATIONS  (STANDING):  ascorbic acid 500 milliGRAM(s) Oral two times a day  aspirin  chewable 81 milliGRAM(s) Oral daily  atorvastatin 40 milliGRAM(s) Oral at bedtime  buDESOnide   0.25 milliGRAM(s) Respule 0.25 milliGRAM(s) Inhalation every 12 hours  buDESOnide 160 MICROgram(s)/formoterol 4.5 MICROgram(s) Inhaler 2 Puff(s) Inhalation two times a day  cefepime  IVPB      cefepime  IVPB 1000 milliGRAM(s) IV Intermittent every 12 hours  docusate sodium 100 milliGRAM(s) Oral three times a day  enoxaparin Injectable 40 milliGRAM(s) SubCutaneous daily  ferrous    sulfate 325 milliGRAM(s) Oral three times a day with meals  folic acid 1 milliGRAM(s) Oral daily  guaiFENesin  milliGRAM(s) Oral every 12 hours  ipratropium    for Nebulization 500 MICROGram(s) Nebulizer every 6 hours  levothyroxine 100 MICROGram(s) Oral daily  mirtazapine 30 milliGRAM(s) Oral at bedtime  multivitamin 1 Tablet(s) Oral daily  OLANZapine 5 milliGRAM(s) Oral at bedtime  tiotropium 18 MICROgram(s) Capsule 1 Capsule(s) Inhalation daily  vancomycin  IVPB 750 milliGRAM(s) IV Intermittent every 12 hours    Vital Signs Last 24 Hrs  T(C): 37 (03-27-18 @ 05:16), Max: 37 (03-26-18 @ 17:04)  T(F): 98.6 (03-27-18 @ 05:16), Max: 98.6 (03-26-18 @ 17:04)  HR: 83 (03-27-18 @ 05:16) (76 - 108)  BP: 111/59 (03-27-18 @ 05:16) (111/59 - 141/64)  BP(mean): --  RR: 18 (03-27-18 @ 05:16) (18 - 22)  SpO2: 100% (03-27-18 @ 05:16) (71% - 100%)    DVT Prophylaxis:  LMWH                   (x  )  Heparin SQ           (  )  Coumadin             (  )  Xarelto                  (  )  Eliquis                   (  )  Venodynes           (x  )  Ambulation          ( x )  UFH                       (  )  Contraindicated  (  )

## 2018-03-27 NOTE — PROGRESS NOTE ADULT - SUBJECTIVE AND OBJECTIVE BOX
HPI:  84yo/F with PMH COPD, hypothyroidism, hyperlipidemia presented s/p mechanical fall. She normally walks with the walker, she did not take one with her when she went to the bathroom and she lost her balance and fell, sustaining LT hip fracture. Patient is s/p hip pinning. Sha has a hx COPD on home O2. She has 60+ pack year smoking history. Mrs. Kong has been desaturating off Os. Cxr shows non acute infiltrates. Previous CT scan chest shows severe COPD. According to daughter, patient becomes agitated after albuterol neb.    3/26: has cough, scant sputum.  CTA chest yesterday: no PE, positve RLL consolidation c/w PNA, ? aspiration.   3/27: events noted, rapid response last PM.  is tachypneic this AM.  has congested sounding cough.  CXR 3/26 R sided infil/PNA.  O2 now down to 3L w/ O2 sat 96 %.     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  Hypothyroidism, unspecified type  Shortening, leg, congenital, right  No significant past surgical history      MEDICATIONS  (STANDING):  ALBUTerol    0.083% 2.5 milliGRAM(s) Nebulizer every 6 hours  ALBUTerol    90 MICROgram(s) HFA Inhaler 1 Puff(s) Inhalation every 4 hours  ascorbic acid 500 milliGRAM(s) Oral two times a day  aspirin  chewable 81 milliGRAM(s) Oral daily  atorvastatin 40 milliGRAM(s) Oral at bedtime  buDESOnide   0.25 milliGRAM(s) Respule 0.25 milliGRAM(s) Inhalation every 12 hours  buDESOnide 160 MICROgram(s)/formoterol 4.5 MICROgram(s) Inhaler 2 Puff(s) Inhalation two times a day  docusate sodium 100 milliGRAM(s) Oral three times a day  enoxaparin Injectable 40 milliGRAM(s) SubCutaneous daily  ferrous    sulfate 325 milliGRAM(s) Oral three times a day with meals  folic acid 1 milliGRAM(s) Oral daily  guaiFENesin  milliGRAM(s) Oral every 12 hours  lactated ringers. 1000 milliLiter(s) (75 mL/Hr) IV Continuous <Continuous>  levothyroxine 100 MICROGram(s) Oral daily  mirtazapine 30 milliGRAM(s) Oral at bedtime  multivitamin 1 Tablet(s) Oral daily  OLANZapine 5 milliGRAM(s) Oral at bedtime  tiotropium 18 MICROgram(s) Capsule 1 Capsule(s) Inhalation daily    MEDICATIONS  (PRN):  acetaminophen   Tablet 650 milliGRAM(s) Oral every 6 hours PRN For Temp greater than 38 C (100.4 F) or headache  benzocaine 15 mG/menthol 3.6 mG Lozenge 1 Lozenge Oral every 3 hours PRN Sore Throat  diphenhydrAMINE   Capsule 25 milliGRAM(s) Oral every 6 hours PRN Rash and/or Itching  ondansetron Injectable 4 milliGRAM(s) IV Push every 6 hours PRN Nausea and/or Vomiting  oxyCODONE    IR 5 milliGRAM(s) Oral every 4 hours PRN Moderate Pain (4 - 6)  zaleplon 5 milliGRAM(s) Oral at bedtime PRN Insomnia      Allergies    No Known Allergies    Intolerances        SOCIAL HISTORY: Denies tobacco, etoh abuse or illicit drug use    FAMILY HISTORY:  Family history of lung cancer (Sibling)  Family history of stomach cancer (Sibling)      Vital Signs Last 24 Hrs  T(C): 36.9 (24 Mar 2018 23:23), Max: 36.9 (24 Mar 2018 23:23)  T(F): 98.4 (24 Mar 2018 23:23), Max: 98.4 (24 Mar 2018 23:23)  HR: 89 (25 Mar 2018 11:37) (89 - 102)  BP: 121/64 (24 Mar 2018 23:23) (121/64 - 125/67)  BP(mean): --  RR: 18 (24 Mar 2018 23:23) (18 - 18)  SpO2: 95% (25 Mar 2018 11:55) (73% - 96%)    REVIEW OF SYSTEMS:    CONSTITUTIONAL:  As per HPI.  SKIN: no rashes  HEENT:  Eyes:  No diplopia or blurred vision. ENT:  No earache, sore throat or runny nose.  CARDIOVASCULAR:  No pressure, squeezing, tightness, heaviness or aching about the chest, neck, axilla or epigastrium.  RESPIRATORY:  see above.  GASTROINTESTINAL:  No nausea, vomiting or diarrhea.  GENITOURINARY:  No dysuria, frequency or urgency.  MUSCULOSKELETAL:  As per HPI.  SKIN:  No change in skin, hair or nails.  NEUROLOGIC:  No paresthesias, fasciculations, seizures or weakness.  PSYCHIATRIC:  No disorder of thought or mood.  ENDOCRINE:  No heat or cold intolerance, polyuria or polydipsia.  HEMATOLOGICAL:  No easy bruising or bleedings:  .     PHYSICAL EXAMINATION:    GENERAL APPEARANCE:  Pt. is not currently dyspneic, in no distress. Pt. is alert, oriented, and pleasant.  HEENT:  Pupils are normal and react normally. No icterus. Mucous membranes well colored.  NECK:  Supple. No lymphadenopathy. Jugular venous pressure not elevated. Carotids equal.   HEART:  S1S2 reg w 2/6 systolic murmur  CHEST:  decreased aud BS's this AM.  ABDOMEN:  Soft and nontender.   EXTREMITIES:  There is no cyanosis, clubbing or edema.   SKIN:  No rash or significant lesions are noted.  CNS: AAO x 3    LABS:                        9.3    10.81 )-----------( 231      ( 25 Mar 2018 06:22 )             29.5     03-25    139  |  102  |  25<H>  ----------------------------<  125<H>  4.0   |  30  |  0.48<L>    Ca    8.4<L>      25 Mar 2018 06:22                    RADIOLOGY & ADDITIONAL STUDIES:     EXAM:  CT ANGIO CHEST PE PROTOCOL IC                            PROCEDURE DATE:  03/25/2018          INTERPRETATION:  Clinical information: Dyspnea    TECHNIQUE:  Axial computed tomographic angiography images of the chest with   intravenous   contrast using pulmonary embolism protocol.     MIP reconstructed images were created and reviewed.  Coronal and sagittal reformatted images were created and reviewed.    CONTRAST:  90 mL of Omnipaque 350 administered intravenously with 10 cc discarded.    COMPARISON:  CT CHEST 2018-02-22 08:57    FINDINGS:  PULMONARY ARTERIES: Adequately opacified and no evidence of pulmonary   embolism.  AORTA: Moderate atherosclerotic arterial calcifications, including   coronary artery calcification. Normal caliberaorta.   LUNGS: Severe emphysema. Moderate sized consolidation at  the right lung base and mild patchy left lower lobe airspace disease.   Mild chronic lingular atelectasis.  PLEURAL SPACE: No pleural effusion or pneumothorax.  HEART: Heart is normalin size. No pericardial effusion.  BONES/JOINTS:  No acute fracture.  No dislocation. Moderate wedge   compression deformity at T7.  SOFT TISSUES:  Unremarkable.  LYMPH NODES:  Unremarkable.  No enlarged lymph nodes.  UPPER ABDOMEN: 1 cm nonobstructing right renal stone.    IMPRESSION:       No pulmonary embolism.    Bilateral lower lobe pneumonia, right side greater than left.    Severe emphysema.

## 2018-03-27 NOTE — PROGRESS NOTE ADULT - ASSESSMENT
This is an 85 year old female s/p IM nail with high risk for VTE due to current pulmonary function, surgery, fracture, and immobility. Moderate bleeding risk due to age.    Explained the risk vs benefit of AC to prevent VTE with patient. Verbalized agreement with LMWH daily a one month period.    Plan:  ::Lovenox 40 mg SQ daily x 4 weeks total started 3-24-18 end 4-21-18  ::Resume ASA 81 mg PO daily   ::Daily CBC/BMP  ::Venodynes b/l  ::Enc ambulation    Will continue to follow.

## 2018-03-27 NOTE — PROGRESS NOTE ADULT - ASSESSMENT
- RLL consolidation greater than LLL patchy findings on CTA of chest.  Treat for PNA (HCAP), ? aspiration.  Aspiration precautions,  Abx's per ID.  speech therapy consult for swallow eval (ordered).  - severe COPD, prob CO2 retention w/ serum CO2 36.  Decrease NC O2 as marce keeping O2 sat greater than 93%.  BIPAP prn during daytime and during sleep.  Encourage deep coughs.  Continue nebs.  Will add solumedrol 40 mg IV qd.  If not improving, may need transfer to ICU.    - cont O2  - discontinue albuterol  - continue atrovent neb and pulmicort  - dvt proph - RLL consolidation greater than LLL patchy findings on CTA of chest.  Treat for PNA (HCAP), ? aspiration.  Aspiration precautions,  Abx's per ID.  speech therapy consult for swallow eval (ordered).  - severe COPD, prob CO2 retention w/ serum CO2 36.  Decrease NC O2 as marce keeping O2 sat greater than 93%.  BIPAP prn during daytime and during sleep.  Encourage deep coughs.  Continue bronchodilators.  Will add solumedrol 40 mg IV qd.  If not improving, may need transfer to ICU.    - cont O2    - continue atrovent neb and pulmicort, add proaire via spacer 1 puff q 4 hrs.  - dvt proph

## 2018-03-27 NOTE — PROVIDER CONTACT NOTE (OTHER) - SITUATION
ordered by the hospitalist due to shortness of breath and desaturation.  Message left with answering service

## 2018-03-27 NOTE — CONSULT NOTE ADULT - SUBJECTIVE AND OBJECTIVE BOX
Patient is a 85y old  Female who presents with a chief complaint of s/p mechanical fall (23 Mar 2018 19:59)    HPI:  84 y/o Female with h/o COPD, hypothyroidism, hyperlipidemia was admitted on 3/23 for increased weakness s/p mechanical fall. She normally walks with the walker, she did not take one with her when she went to the bathroom and she lost her balance and fell, sustaining LT hip fracture. She denies LOC. She underwent left hip ORIF without complications on same day. On 3/26 she was noted with SOB and cough; she was started on vancomycin IV and cefepime.      PMH: as above  PSH: as above  Meds: per reconciliation sheet, noted below  MEDICATIONS  (STANDING):  ascorbic acid 500 milliGRAM(s) Oral two times a day  aspirin  chewable 81 milliGRAM(s) Oral daily  atorvastatin 40 milliGRAM(s) Oral at bedtime  buDESOnide   0.25 milliGRAM(s) Respule 0.25 milliGRAM(s) Inhalation every 12 hours  buDESOnide 160 MICROgram(s)/formoterol 4.5 MICROgram(s) Inhaler 2 Puff(s) Inhalation two times a day  cefepime  IVPB      cefepime  IVPB 1000 milliGRAM(s) IV Intermittent every 12 hours  docusate sodium 100 milliGRAM(s) Oral three times a day  enoxaparin Injectable 40 milliGRAM(s) SubCutaneous daily  ferrous    sulfate 325 milliGRAM(s) Oral three times a day with meals  folic acid 1 milliGRAM(s) Oral daily  guaiFENesin  milliGRAM(s) Oral every 12 hours  ipratropium    for Nebulization 500 MICROGram(s) Nebulizer every 6 hours  levothyroxine 100 MICROGram(s) Oral daily  mirtazapine 30 milliGRAM(s) Oral at bedtime  multivitamin 1 Tablet(s) Oral daily  OLANZapine 5 milliGRAM(s) Oral at bedtime  tiotropium 18 MICROgram(s) Capsule 1 Capsule(s) Inhalation daily  vancomycin  IVPB 750 milliGRAM(s) IV Intermittent every 12 hours    MEDICATIONS  (PRN):  acetaminophen   Tablet 650 milliGRAM(s) Oral every 6 hours PRN For Temp greater than 38 C (100.4 F) or headache  benzocaine 15 mG/menthol 3.6 mG Lozenge 1 Lozenge Oral every 3 hours PRN Sore Throat  diphenhydrAMINE   Capsule 25 milliGRAM(s) Oral every 6 hours PRN Rash and/or Itching  ondansetron Injectable 4 milliGRAM(s) IV Push every 6 hours PRN Nausea and/or Vomiting  oxyCODONE    IR 5 milliGRAM(s) Oral every 4 hours PRN Moderate Pain (4 - 6)  zaleplon 5 milliGRAM(s) Oral at bedtime PRN Insomnia    Allergies    No Known Allergies    Intolerances      Social: no smoking, no alcohol, no illegal drugs; no recent travel, no exposure to TB  FAMILY HISTORY:  Family history of lung cancer (Sibling)  Family history of stomach cancer (Sibling)    ROS: the patient denies fever, no chills, no HA, no dizziness, no sore throat, no blurry vision, no CP, no palpitations, has SOB, has cough, no abdominal pain, no diarrhea, no N/V, no dysuria, no leg pain, no claudication, no rash, no joint aches, no rectal pain or bleeding, no night sweats    Vital Signs Last 24 Hrs  T(C): 37 (27 Mar 2018 05:16), Max: 37 (26 Mar 2018 17:04)  T(F): 98.6 (27 Mar 2018 05:16), Max: 98.6 (26 Mar 2018 17:04)  HR: 83 (27 Mar 2018 05:16) (76 - 108)  BP: 111/59 (27 Mar 2018 05:16) (111/59 - 141/64)  BP(mean): --  RR: 18 (27 Mar 2018 05:16) (18 - 22)  SpO2: 100% (27 Mar 2018 05:16) (71% - 100%)  Daily     Daily     PE:    Constitutional: frail looking  HEENT: NC/AT, EOMI, PERRLA  Neck: supple  Back: no tenderness  Respiratory: crackles at bases  Cardiovascular: S1S2 regular, no murmurs  Abdomen: soft, not tender, not distended, positive BS  Genitourinary: no LN palpable  Rectal: deferred  Musculoskeletal: no muscle tenderness, no joint swelling or tenderness  Extremities: no pedal edema  Neurological: AxOx3, moving all extremities  Skin: no rashes    Labs:                        8.9    11.95 )-----------( 269      ( 27 Mar 2018 07:52 )             28.2     03-27    138  |  96  |  21  ----------------------------<  125<H>  4.1   |  36<H>  |  0.50    Ca    8.3<L>      27 Mar 2018 05:35      Culture - Urine (collected 22 Mar 2018 13:48)  Source: .Urine None  Final Report (23 Mar 2018 22:17):    No growth      Radiology:    < from: CT Angio Chest PE Protocol w/ IV Cont (03.25.18 @ 20:16) >  No pulmonary embolism.    Bilateral lower lobe pneumonia, right side greater than left.    Severe emphysema.    < end of copied text >      Advanced directives addressed: full resuscitation Patient is a 85y old  Female who presents with a chief complaint of s/p mechanical fall (23 Mar 2018 19:59)    HPI:  84 y/o Female with h/o COPD, hypothyroidism, hyperlipidemia was admitted on 3/23 for increased weakness s/p mechanical fall. She normally walks with the walker, she did not take one with her when she went to the bathroom and she lost her balance and fell, sustaining LT hip fracture. She denies LOC. She underwent left hip ORIF without complications on same day. On 3/26 she was noted with SOB and cough; she was started on vancomycin IV and cefepime.      PMH: as above  PSH: as above  Meds: per reconciliation sheet, noted below  MEDICATIONS  (STANDING):  ascorbic acid 500 milliGRAM(s) Oral two times a day  aspirin  chewable 81 milliGRAM(s) Oral daily  atorvastatin 40 milliGRAM(s) Oral at bedtime  buDESOnide   0.25 milliGRAM(s) Respule 0.25 milliGRAM(s) Inhalation every 12 hours  buDESOnide 160 MICROgram(s)/formoterol 4.5 MICROgram(s) Inhaler 2 Puff(s) Inhalation two times a day  cefepime  IVPB      cefepime  IVPB 1000 milliGRAM(s) IV Intermittent every 12 hours  docusate sodium 100 milliGRAM(s) Oral three times a day  enoxaparin Injectable 40 milliGRAM(s) SubCutaneous daily  ferrous    sulfate 325 milliGRAM(s) Oral three times a day with meals  folic acid 1 milliGRAM(s) Oral daily  guaiFENesin  milliGRAM(s) Oral every 12 hours  ipratropium    for Nebulization 500 MICROGram(s) Nebulizer every 6 hours  levothyroxine 100 MICROGram(s) Oral daily  mirtazapine 30 milliGRAM(s) Oral at bedtime  multivitamin 1 Tablet(s) Oral daily  OLANZapine 5 milliGRAM(s) Oral at bedtime  tiotropium 18 MICROgram(s) Capsule 1 Capsule(s) Inhalation daily  vancomycin  IVPB 750 milliGRAM(s) IV Intermittent every 12 hours    MEDICATIONS  (PRN):  acetaminophen   Tablet 650 milliGRAM(s) Oral every 6 hours PRN For Temp greater than 38 C (100.4 F) or headache  benzocaine 15 mG/menthol 3.6 mG Lozenge 1 Lozenge Oral every 3 hours PRN Sore Throat  diphenhydrAMINE   Capsule 25 milliGRAM(s) Oral every 6 hours PRN Rash and/or Itching  ondansetron Injectable 4 milliGRAM(s) IV Push every 6 hours PRN Nausea and/or Vomiting  oxyCODONE    IR 5 milliGRAM(s) Oral every 4 hours PRN Moderate Pain (4 - 6)  zaleplon 5 milliGRAM(s) Oral at bedtime PRN Insomnia    Allergies    No Known Allergies    Intolerances      Social: no smoking, no alcohol, no illegal drugs; no recent travel, no exposure to TB  FAMILY HISTORY:  Family history of lung cancer (Sibling)  Family history of stomach cancer (Sibling)    ROS: the patient denies fever, no chills, no HA, no dizziness, no sore throat, no blurry vision, no CP, no palpitations, has SOB, has cough, no abdominal pain, no diarrhea, no N/V, no dysuria, no leg pain, no claudication, no rash, no joint aches, no rectal pain or bleeding, no night sweats    Vital Signs Last 24 Hrs  T(C): 37 (27 Mar 2018 05:16), Max: 37 (26 Mar 2018 17:04)  T(F): 98.6 (27 Mar 2018 05:16), Max: 98.6 (26 Mar 2018 17:04)  HR: 83 (27 Mar 2018 05:16) (76 - 108)  BP: 111/59 (27 Mar 2018 05:16) (111/59 - 141/64)  BP(mean): --  RR: 18 (27 Mar 2018 05:16) (18 - 22)  SpO2: 100% (27 Mar 2018 05:16) (71% - 100%)  Daily     Daily     PE:    Constitutional: frail looking  HEENT: NC/AT, EOMI, PERRLA  Neck: supple  Back: no tenderness  Respiratory: crackles at bases  Cardiovascular: S1S2 regular, no murmurs  Abdomen: soft, not tender, not distended, positive BS  Genitourinary: no LN palpable  Rectal: deferred  Musculoskeletal: no muscle tenderness, no joint swelling or tenderness  Extremities: no pedal edema; left hip wound clean  Neurological: AxOx3, moving all extremities  Skin: no rashes    Labs:                        8.9    11.95 )-----------( 269      ( 27 Mar 2018 07:52 )             28.2     03-27    138  |  96  |  21  ----------------------------<  125<H>  4.1   |  36<H>  |  0.50    Ca    8.3<L>      27 Mar 2018 05:35      Culture - Urine (collected 22 Mar 2018 13:48)  Source: .Urine None  Final Report (23 Mar 2018 22:17):    No growth      Radiology:    < from: CT Angio Chest PE Protocol w/ IV Cont (03.25.18 @ 20:16) >  No pulmonary embolism.    Bilateral lower lobe pneumonia, right side greater than left.    Severe emphysema.    < end of copied text >      Advanced directives addressed: full resuscitation

## 2018-03-27 NOTE — PROGRESS NOTE ADULT - ATTENDING COMMENTS
Risk and Benefits discussed with daughter.  Consents to procedure.    A: Left IT fx  P: ORIF with short Gamma  Bedrest.  NPO
s/p Left Hip IM Nail for IT Fx    P: Continue medical management,  WBAT LLE  Ortho stable.

## 2018-03-27 NOTE — CONSULT NOTE ADULT - ASSESSMENT
84 y/o Female with h/o COPD, hypothyroidism, hyperlipidemia was admitted on 3/23 for increased weakness s/p mechanical fall. She normally walks with the walker, she did not take one with her when she went to the bathroom and she lost her balance and fell, sustaining LT hip fracture. She denies LOC. She underwent left hip ORIF without complications on same day. On 3/26 she was noted with SOB and cough; she was started on vancomycin IV and cefepime.    1. B/l lower lobes pneumonia. Possible aspiration pneumonia. Left hip fracture s/p ORIF.  -obtain BC x 2  -CT chest reviewed  -agree with vancomycin 750 mg IV q12h and cefepime 1 gm IV q12h  -reason for abx use and side effects reviewed with patient; monitor BMP and vancomycin trough levels  -aspiration precautions  -monitor temps  -f/u CBC  -supportive care  2. Other issues:   -care per medicine 84 y/o Female with h/o COPD, hypothyroidism, hyperlipidemia was admitted on 3/23 for increased weakness s/p mechanical fall. She normally walks with the walker, she did not take one with her when she went to the bathroom and she lost her balance and fell, sustaining LT hip fracture. She denies LOC. She underwent left hip ORIF without complications on same day. On 3/26 she was noted with SOB and cough; she was started on vancomycin IV and cefepime.    1. B/l lower lobes pneumonia. Possible aspiration pneumonia. Left hip fracture s/p ORIF.  -obtain BC x 2  -CT chest reviewed  -start vancomycin 750 mg IV q12h and cefepime 1 gm IV q12h  -reason for abx use and side effects reviewed with patient; monitor BMP and vancomycin trough levels  -aspiration precautions  -local wound care  -monitor temps  -f/u CBC  -supportive care  2. Other issues:   -care per medicine

## 2018-03-27 NOTE — PROGRESS NOTE ADULT - SUBJECTIVE AND OBJECTIVE BOX
Pt S/E at bedside, rapid response overnight, no issues since starting BIPAP, pain controlled    AVSS  Gen: NAD, AAOx3    Left Lower Extremity:  Dressing clean dry intact  +EHL/FHL/TA/GS  SILT L3-S1  +DP/PT Pulses  Compartments soft  No calf TTP B/L

## 2018-03-27 NOTE — PROGRESS NOTE ADULT - SUBJECTIVE AND OBJECTIVE BOX
c/c: mechanical fall    HPI:  84yo/F with PMH COPD, hypothyroidism, hyperlipidemia, psychiatric history on zyprexa, dementia, recently came home from rehab,  who presented s/p mechanical fall.  She normally walks with the walker, she did not take one with her when she went to the bathroom and she lost her balance and fell, sustaining LT hip fracture. She was admitted for further management. Underwent IM nail 3/23    3/25: Pt seen and examined this am. Felt uncomfortable, tired. No f/c/r. Tolerating po. No acute overnight events.   3/26/18 c/o SOB, cough  3/27/18- s/p RR last night for hypoxia, now off ventimask and on NC doing good    Review of system- All 10 systems reviewed and is as per HPI otherwise negative.     Vital Signs Last 24 Hrs  T(C): 36.9 (27 Mar 2018 11:17), Max: 37 (26 Mar 2018 17:04)  T(F): 98.5 (27 Mar 2018 11:17), Max: 98.6 (26 Mar 2018 17:04)  HR: 95 (27 Mar 2018 11:17) (76 - 108)  BP: 121/57 (27 Mar 2018 11:17) (111/59 - 141/64)  BP(mean): --  RR: 18 (27 Mar 2018 11:17) (18 - 22)  SpO2: 97% (27 Mar 2018 11:17) (71% - 100%)    PHYSICAL EXAM:  GENERAL: Comfortable, no acute distress  HEAD:  Atraumatic, Normocephalic  EYES: EOMI, PERRLA  HEENT: moist mucous membranes  NECK: Supple, No JVD  NERVOUS SYSTEM:  non focal, confused  CHEST/LUNG: rhonchi b/l  HEART: S1,S2+, Regular rate and rhythm;   ABDOMEN: Soft, Nontender, Nondistended; Bowel sounds present  GENITOURINARY- Voiding, no palpable bladder  EXTREMITIES:  No clubbing, cyanosis, or edema  MUSCULOSKELETAL- left hip dressing intact  SKIN-no rash    LABS:                        8.9    11.95 )-----------( 269      ( 27 Mar 2018 07:52 )             28.2     138    |  96     |  21     ----------------------------<  125    4.1     |  36     |  0.50     Ca    8.3        27 Mar 2018 05:35    Radiology:  EXAM:  CT ANGIO CHEST PE PROTOCOL IC                        PROCEDURE DATE:  03/25/2018    INTERPRETATION:  Clinical information: Dyspnea    TECHNIQUE:  Axial computed tomographic angiography images of the chest with   intravenous   contrast using pulmonary embolism protocol.     MIP reconstructed images were created and reviewed.  Coronal and sagittal reformatted images were created and reviewed.    CONTRAST:  90 mL of Omnipaque 350 administered intravenously with 10 cc discarded.    COMPARISON:  CT CHEST 2018-02-22 08:57    FINDINGS:  PULMONARY ARTERIES: Adequately opacified and no evidence of pulmonary   embolism.  AORTA: Moderate atherosclerotic arterial calcifications, including   coronary artery calcification. Normal caliberaorta.   LUNGS: Severe emphysema. Moderate sized consolidation at  the right lung base and mild patchy left lower lobe airspace disease.   Mild chronic lingular atelectasis.  PLEURAL SPACE: No pleural effusion or pneumothorax.  HEART: Heart is normalin size. No pericardial effusion.  BONES/JOINTS:  No acute fracture.  No dislocation. Moderate wedge   compression deformity at T7.  SOFT TISSUES:  Unremarkable.  LYMPH NODES:  Unremarkable.  No enlarged lymph nodes.  UPPER ABDOMEN: 1 cm nonobstructing right renal stone.    IMPRESSION:       No pulmonary embolism.  Bilateral lower lobe pneumonia, right side greater than left.  Severe emphysema.    EXAM:  XR CHEST AP OR PA 1V                        PROCEDURE DATE:  03/26/2018    INTERPRETATION:  Follow-up.    AP chest. Prior 3/25/2018.  Rotated to right. No change heart mediastinum. No significant interval   change in the airspace infiltrate in the right mid to lower lung field.   Generalized chronic accentuation bronchovascular markings similar to   prior. No pleural effusion, pneumothorax or other new abnormality.    Impression: No significant interval change.    MEDICATIONS  (STANDING):  ascorbic acid 500 milliGRAM(s) Oral two times a day  aspirin  chewable 81 milliGRAM(s) Oral daily  atorvastatin 40 milliGRAM(s) Oral at bedtime  buDESOnide   0.25 milliGRAM(s) Respule 0.25 milliGRAM(s) Inhalation every 12 hours  buDESOnide 160 MICROgram(s)/formoterol 4.5 MICROgram(s) Inhaler 2 Puff(s) Inhalation two times a day  cefepime  IVPB      cefepime  IVPB 1000 milliGRAM(s) IV Intermittent once  cefepime  IVPB 1000 milliGRAM(s) IV Intermittent every 12 hours  docusate sodium 100 milliGRAM(s) Oral three times a day  enoxaparin Injectable 40 milliGRAM(s) SubCutaneous daily  ferrous    sulfate 325 milliGRAM(s) Oral three times a day with meals  folic acid 1 milliGRAM(s) Oral daily  guaiFENesin  milliGRAM(s) Oral every 12 hours  ipratropium    for Nebulization 500 MICROGram(s) Nebulizer every 6 hours  levothyroxine 100 MICROGram(s) Oral daily  mirtazapine 30 milliGRAM(s) Oral at bedtime  multivitamin 1 Tablet(s) Oral daily  OLANZapine 5 milliGRAM(s) Oral at bedtime  tiotropium 18 MICROgram(s) Capsule 1 Capsule(s) Inhalation daily    MEDICATIONS  (PRN):  acetaminophen   Tablet 650 milliGRAM(s) Oral every 6 hours PRN For Temp greater than 38 C (100.4 F) or headache  benzocaine 15 mG/menthol 3.6 mG Lozenge 1 Lozenge Oral every 3 hours PRN Sore Throat  diphenhydrAMINE   Capsule 25 milliGRAM(s) Oral every 6 hours PRN Rash and/or Itching  ondansetron Injectable 4 milliGRAM(s) IV Push every 6 hours PRN Nausea and/or Vomiting  oxyCODONE    IR 5 milliGRAM(s) Oral every 4 hours PRN Moderate Pain (4 - 6)  zaleplon 5 milliGRAM(s) Oral at bedtime PRN Insomnia    ASSESSMENT AND PLAN:  #S/p mechanical fall with LT hip fracture s/p IMN/ anemia acute blood loss likely 2 to fracture  Ortho f/u appreciated  PT as tolerated  Monitor HH  Pain meds prn  Bowel regimen  Incentive spirometry  AC by Lovenox    #Pneumonia likely HCAP/ Acute resp failure/hypoxia likely 2 to PNA  CT chest reviewed, last night CXR reviewed  On BiPAP intermittently  Pulm eval appreciated, d/w DR Berger  ID eval appreciated- cont IV abx as per ID  Swallow eval appreciated- on regular diet    #COPD acute exacerbation/chr resp failure on home O2  Pulm f/u appreciated  Started on IV steroids small dose  Nebs    #Hypothyroidism/hyperlipidemia  Stable    #Dispo- treat pneumonia. Not ready for discharge yet. Eventually SOPHIE

## 2018-03-28 LAB
ANION GAP SERPL CALC-SCNC: 8 MMOL/L — SIGNIFICANT CHANGE UP (ref 5–17)
BUN SERPL-MCNC: 20 MG/DL — SIGNIFICANT CHANGE UP (ref 7–23)
CALCIUM SERPL-MCNC: 8.8 MG/DL — SIGNIFICANT CHANGE UP (ref 8.5–10.1)
CHLORIDE SERPL-SCNC: 96 MMOL/L — SIGNIFICANT CHANGE UP (ref 96–108)
CO2 SERPL-SCNC: 30 MMOL/L — SIGNIFICANT CHANGE UP (ref 22–31)
CREAT SERPL-MCNC: 0.59 MG/DL — SIGNIFICANT CHANGE UP (ref 0.5–1.3)
GLUCOSE SERPL-MCNC: 136 MG/DL — HIGH (ref 70–99)
GRAM STN FLD: SIGNIFICANT CHANGE UP
HCT VFR BLD CALC: 31.4 % — LOW (ref 34.5–45)
HGB BLD-MCNC: 9.9 G/DL — LOW (ref 11.5–15.5)
MCHC RBC-ENTMCNC: 27 PG — SIGNIFICANT CHANGE UP (ref 27–34)
MCHC RBC-ENTMCNC: 31.5 GM/DL — LOW (ref 32–36)
MCV RBC AUTO: 85.6 FL — SIGNIFICANT CHANGE UP (ref 80–100)
NRBC # BLD: 0 /100 WBCS — SIGNIFICANT CHANGE UP (ref 0–0)
PLATELET # BLD AUTO: 362 K/UL — SIGNIFICANT CHANGE UP (ref 150–400)
POTASSIUM SERPL-MCNC: 4.2 MMOL/L — SIGNIFICANT CHANGE UP (ref 3.5–5.3)
POTASSIUM SERPL-SCNC: 4.2 MMOL/L — SIGNIFICANT CHANGE UP (ref 3.5–5.3)
RBC # BLD: 3.67 M/UL — LOW (ref 3.8–5.2)
RBC # FLD: 13.8 % — SIGNIFICANT CHANGE UP (ref 10.3–14.5)
SODIUM SERPL-SCNC: 134 MMOL/L — LOW (ref 135–145)
SPECIMEN SOURCE: SIGNIFICANT CHANGE UP
VANCOMYCIN TROUGH SERPL-MCNC: 14.2 UG/ML — SIGNIFICANT CHANGE UP (ref 10–20)
VANCOMYCIN TROUGH SERPL-MCNC: 16.5 UG/ML — SIGNIFICANT CHANGE UP (ref 10–20)
WBC # BLD: 12.19 K/UL — HIGH (ref 3.8–10.5)
WBC # FLD AUTO: 12.19 K/UL — HIGH (ref 3.8–10.5)

## 2018-03-28 PROCEDURE — 99233 SBSQ HOSP IP/OBS HIGH 50: CPT

## 2018-03-28 RX ORDER — BUDESONIDE AND FORMOTEROL FUMARATE DIHYDRATE 160; 4.5 UG/1; UG/1
2 AEROSOL RESPIRATORY (INHALATION)
Qty: 0 | Refills: 0 | Status: DISCONTINUED | OUTPATIENT
Start: 2018-03-28 | End: 2018-03-30

## 2018-03-28 RX ADMIN — Medication 100 MILLIGRAM(S): at 11:48

## 2018-03-28 RX ADMIN — CEFEPIME 100 MILLIGRAM(S): 1 INJECTION, POWDER, FOR SOLUTION INTRAMUSCULAR; INTRAVENOUS at 11:47

## 2018-03-28 RX ADMIN — BUDESONIDE AND FORMOTEROL FUMARATE DIHYDRATE 2 PUFF(S): 160; 4.5 AEROSOL RESPIRATORY (INHALATION) at 19:34

## 2018-03-28 RX ADMIN — BUDESONIDE AND FORMOTEROL FUMARATE DIHYDRATE 2 PUFF(S): 160; 4.5 AEROSOL RESPIRATORY (INHALATION) at 08:00

## 2018-03-28 RX ADMIN — ALBUTEROL 1 PUFF(S): 90 AEROSOL, METERED ORAL at 19:34

## 2018-03-28 RX ADMIN — Medication 40 MILLIGRAM(S): at 06:16

## 2018-03-28 RX ADMIN — ATORVASTATIN CALCIUM 40 MILLIGRAM(S): 80 TABLET, FILM COATED ORAL at 21:46

## 2018-03-28 RX ADMIN — Medication 1 MILLIGRAM(S): at 11:48

## 2018-03-28 RX ADMIN — Medication 500 MICROGRAM(S): at 06:54

## 2018-03-28 RX ADMIN — Medication 600 MILLIGRAM(S): at 18:22

## 2018-03-28 RX ADMIN — Medication 100 MILLIGRAM(S): at 06:16

## 2018-03-28 RX ADMIN — Medication 500 MICROGRAM(S): at 19:33

## 2018-03-28 RX ADMIN — Medication 500 MICROGRAM(S): at 13:13

## 2018-03-28 RX ADMIN — ALBUTEROL 1 PUFF(S): 90 AEROSOL, METERED ORAL at 23:56

## 2018-03-28 RX ADMIN — Medication 325 MILLIGRAM(S): at 11:48

## 2018-03-28 RX ADMIN — OLANZAPINE 5 MILLIGRAM(S): 15 TABLET, FILM COATED ORAL at 21:46

## 2018-03-28 RX ADMIN — Medication 150.1 MILLIGRAM(S): at 21:46

## 2018-03-28 RX ADMIN — ALBUTEROL 1 PUFF(S): 90 AEROSOL, METERED ORAL at 11:22

## 2018-03-28 RX ADMIN — ENOXAPARIN SODIUM 40 MILLIGRAM(S): 100 INJECTION SUBCUTANEOUS at 11:47

## 2018-03-28 RX ADMIN — ALBUTEROL 1 PUFF(S): 90 AEROSOL, METERED ORAL at 15:47

## 2018-03-28 RX ADMIN — Medication 150.1 MILLIGRAM(S): at 09:47

## 2018-03-28 RX ADMIN — MIRTAZAPINE 30 MILLIGRAM(S): 45 TABLET, ORALLY DISINTEGRATING ORAL at 21:46

## 2018-03-28 RX ADMIN — Medication 500 MILLIGRAM(S): at 06:16

## 2018-03-28 RX ADMIN — Medication 100 MICROGRAM(S): at 06:16

## 2018-03-28 RX ADMIN — Medication 81 MILLIGRAM(S): at 11:47

## 2018-03-28 RX ADMIN — Medication 600 MILLIGRAM(S): at 06:16

## 2018-03-28 RX ADMIN — Medication 1 TABLET(S): at 11:47

## 2018-03-28 NOTE — PROGRESS NOTE ADULT - ASSESSMENT
- RLL consolidation greater than LLL patchy findings on CTA of chest.  Treat for PNA (HCAP), ? aspiration.  Aspiration precautions,  Abx's per ID. On cefepime, vanco.    - severe COPD.  Improvement in SOB today.  Decrease NC O2 as marce keeping O2 sat greater than 93%.  BIPAP prn during daytime and during sleep.  Encourage deep coughs.  Continue bronchodilators.  solumedrol 40 mg IV qd. began yest.        - continue atrovent nebs and pulmicort, add proaire via spacer 1 puff q 4 hrs.  - dvt proph

## 2018-03-28 NOTE — PROGRESS NOTE ADULT - SUBJECTIVE AND OBJECTIVE BOX
HPI:  84yo/F with PMH COPD, hypothyroidism, hyperlipidemia presented s/p mechanical fall. She normally walks with the walker, she did not take one with her when she went to the bathroom and she lost her balance and fell, sustaining LT hip fracture. Patient is s/p hip pinning. Brennen has a hx COPD on home O2. She has 60+ pack year smoking history. Mrs. Kong has been desaturating off Os. Cxr shows non acute infiltrates. Previous CT scan chest shows severe COPD. According to daughter, patient becomes agitated after albuterol neb.    3/26: has cough, scant sputum.  CTA chest yesterday: no PE, positve RLL consolidation c/w PNA, ? aspiration.   3/27: events noted, rapid response last PM.  is tachypneic this AM.  has congested sounding cough.  CXR 3/26 R sided infil/PNA.  O2 now down to 3L w/ O2 sat 96 %.   3/28: sitting up in chair, breathing improved from yest, no distress.     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  Hypothyroidism, unspecified type  Shortening, leg, congenital, right  No significant past surgical history      MEDICATIONS  (STANDING):  ALBUTerol    0.083% 2.5 milliGRAM(s) Nebulizer every 6 hours  ALBUTerol    90 MICROgram(s) HFA Inhaler 1 Puff(s) Inhalation every 4 hours  ascorbic acid 500 milliGRAM(s) Oral two times a day  aspirin  chewable 81 milliGRAM(s) Oral daily  atorvastatin 40 milliGRAM(s) Oral at bedtime  buDESOnide   0.25 milliGRAM(s) Respule 0.25 milliGRAM(s) Inhalation every 12 hours  buDESOnide 160 MICROgram(s)/formoterol 4.5 MICROgram(s) Inhaler 2 Puff(s) Inhalation two times a day  docusate sodium 100 milliGRAM(s) Oral three times a day  enoxaparin Injectable 40 milliGRAM(s) SubCutaneous daily  ferrous    sulfate 325 milliGRAM(s) Oral three times a day with meals  folic acid 1 milliGRAM(s) Oral daily  guaiFENesin  milliGRAM(s) Oral every 12 hours  lactated ringers. 1000 milliLiter(s) (75 mL/Hr) IV Continuous <Continuous>  levothyroxine 100 MICROGram(s) Oral daily  mirtazapine 30 milliGRAM(s) Oral at bedtime  multivitamin 1 Tablet(s) Oral daily  OLANZapine 5 milliGRAM(s) Oral at bedtime  tiotropium 18 MICROgram(s) Capsule 1 Capsule(s) Inhalation daily    MEDICATIONS  (PRN):  acetaminophen   Tablet 650 milliGRAM(s) Oral every 6 hours PRN For Temp greater than 38 C (100.4 F) or headache  benzocaine 15 mG/menthol 3.6 mG Lozenge 1 Lozenge Oral every 3 hours PRN Sore Throat  diphenhydrAMINE   Capsule 25 milliGRAM(s) Oral every 6 hours PRN Rash and/or Itching  ondansetron Injectable 4 milliGRAM(s) IV Push every 6 hours PRN Nausea and/or Vomiting  oxyCODONE    IR 5 milliGRAM(s) Oral every 4 hours PRN Moderate Pain (4 - 6)  zaleplon 5 milliGRAM(s) Oral at bedtime PRN Insomnia      Allergies    No Known Allergies    Intolerances        SOCIAL HISTORY: Denies tobacco, etoh abuse or illicit drug use    FAMILY HISTORY:  Family history of lung cancer (Sibling)  Family history of stomach cancer (Sibling)      Vital Signs Last 24 Hrs  T(C): 36.9 (24 Mar 2018 23:23), Max: 36.9 (24 Mar 2018 23:23)  T(F): 98.4 (24 Mar 2018 23:23), Max: 98.4 (24 Mar 2018 23:23)  HR: 89 (25 Mar 2018 11:37) (89 - 102)  BP: 121/64 (24 Mar 2018 23:23) (121/64 - 125/67)  BP(mean): --  RR: 18 (24 Mar 2018 23:23) (18 - 18)  SpO2: 95% (25 Mar 2018 11:55) (73% - 96%)    REVIEW OF SYSTEMS:    CONSTITUTIONAL:  As per HPI.  SKIN: no rashes  HEENT:  Eyes:  No diplopia or blurred vision. ENT:  No earache, sore throat or runny nose.  CARDIOVASCULAR:  No pressure, squeezing, tightness, heaviness or aching about the chest, neck, axilla or epigastrium.  RESPIRATORY:  see above.  GASTROINTESTINAL:  No nausea, vomiting or diarrhea.  GENITOURINARY:  No dysuria, frequency or urgency.  MUSCULOSKELETAL:  As per HPI.  SKIN:  No change in skin, hair or nails.  NEUROLOGIC:  No paresthesias, fasciculations, seizures or weakness.  PSYCHIATRIC:  No disorder of thought or mood.  ENDOCRINE:  No heat or cold intolerance, polyuria or polydipsia.  HEMATOLOGICAL:  No easy bruising or bleedings:  .     PHYSICAL EXAMINATION:    GENERAL APPEARANCE:  Pt. is not currently dyspneic, in no distress. Pt. is alert, oriented, and pleasant.  HEENT:  Pupils are normal and react normally. No icterus. Mucous membranes well colored.  NECK:  Supple. No lymphadenopathy. Jugular venous pressure not elevated. Carotids equal.   HEART:  S1S2 reg w 2/6 systolic murmur  CHEST:  decreased aud BS's bilat  ABDOMEN:  Soft and nontender.   EXTREMITIES:  There is no cyanosis, clubbing or edema.   SKIN:  No rash or significant lesions are noted.  CNS: AAO x 3    LABS:                        9.3    10.81 )-----------( 231      ( 25 Mar 2018 06:22 )             29.5     03-25    139  |  102  |  25<H>  ----------------------------<  125<H>  4.0   |  30  |  0.48<L>    Ca    8.4<L>      25 Mar 2018 06:22                    RADIOLOGY & ADDITIONAL STUDIES:     EXAM:  CT ANGIO CHEST PE PROTOCOL IC                            PROCEDURE DATE:  03/25/2018          INTERPRETATION:  Clinical information: Dyspnea    TECHNIQUE:  Axial computed tomographic angiography images of the chest with   intravenous   contrast using pulmonary embolism protocol.     MIP reconstructed images were created and reviewed.  Coronal and sagittal reformatted images were created and reviewed.    CONTRAST:  90 mL of Omnipaque 350 administered intravenously with 10 cc discarded.    COMPARISON:  CT CHEST 2018-02-22 08:57    FINDINGS:  PULMONARY ARTERIES: Adequately opacified and no evidence of pulmonary   embolism.  AORTA: Moderate atherosclerotic arterial calcifications, including   coronary artery calcification. Normal caliberaorta.   LUNGS: Severe emphysema. Moderate sized consolidation at  the right lung base and mild patchy left lower lobe airspace disease.   Mild chronic lingular atelectasis.  PLEURAL SPACE: No pleural effusion or pneumothorax.  HEART: Heart is normalin size. No pericardial effusion.  BONES/JOINTS:  No acute fracture.  No dislocation. Moderate wedge   compression deformity at T7.  SOFT TISSUES:  Unremarkable.  LYMPH NODES:  Unremarkable.  No enlarged lymph nodes.  UPPER ABDOMEN: 1 cm nonobstructing right renal stone.    IMPRESSION:       No pulmonary embolism.    Bilateral lower lobe pneumonia, right side greater than left.    Severe emphysema.

## 2018-03-28 NOTE — PROGRESS NOTE ADULT - SUBJECTIVE AND OBJECTIVE BOX
Patient is a 85y old  Female who presents with a chief complaint of s/p mechanical fall (23 Mar 2018 19:59)    Date of service: 03-28-18 @ 10:22    Sitting in chair in NAD  Has cough, but improving  No SOB at rest    ROS: no fever or chills; denies dizziness, no HA, no diarrhea, no abdominal pain, no dysuria, no legs pain, no rashes    MEDICATIONS  (STANDING):  ALBUTerol    90 MICROgram(s) HFA Inhaler 1 Puff(s) Inhalation every 4 hours  ascorbic acid 500 milliGRAM(s) Oral two times a day  aspirin  chewable 81 milliGRAM(s) Oral daily  atorvastatin 40 milliGRAM(s) Oral at bedtime  buDESOnide   0.25 milliGRAM(s) Respule 0.25 milliGRAM(s) Inhalation every 12 hours  buDESOnide 160 MICROgram(s)/formoterol 4.5 MICROgram(s) Inhaler 2 Puff(s) Inhalation two times a day  cefepime  IVPB      cefepime  IVPB 1000 milliGRAM(s) IV Intermittent every 12 hours  docusate sodium 100 milliGRAM(s) Oral three times a day  enoxaparin Injectable 40 milliGRAM(s) SubCutaneous daily  ferrous    sulfate 325 milliGRAM(s) Oral three times a day with meals  folic acid 1 milliGRAM(s) Oral daily  guaiFENesin  milliGRAM(s) Oral every 12 hours  ipratropium    for Nebulization 500 MICROGram(s) Nebulizer every 6 hours  levothyroxine 100 MICROGram(s) Oral daily  methylPREDNISolone sodium succinate Injectable 40 milliGRAM(s) IV Push daily  mirtazapine 30 milliGRAM(s) Oral at bedtime  multivitamin 1 Tablet(s) Oral daily  OLANZapine 5 milliGRAM(s) Oral at bedtime  tiotropium 18 MICROgram(s) Capsule 1 Capsule(s) Inhalation daily  vancomycin  IVPB 750 milliGRAM(s) IV Intermittent every 12 hours    MEDICATIONS  (PRN):  acetaminophen   Tablet 650 milliGRAM(s) Oral every 6 hours PRN For Temp greater than 38 C (100.4 F) or headache  benzocaine 15 mG/menthol 3.6 mG Lozenge 1 Lozenge Oral every 3 hours PRN Sore Throat  diphenhydrAMINE   Capsule 25 milliGRAM(s) Oral every 6 hours PRN Rash and/or Itching  ondansetron Injectable 4 milliGRAM(s) IV Push every 6 hours PRN Nausea and/or Vomiting  oxyCODONE    IR 5 milliGRAM(s) Oral every 4 hours PRN Moderate Pain (4 - 6)  zaleplon 5 milliGRAM(s) Oral at bedtime PRN Insomnia      Vital Signs Last 24 Hrs  T(C): 36.4 (28 Mar 2018 04:30), Max: 36.9 (27 Mar 2018 11:17)  T(F): 97.5 (28 Mar 2018 04:30), Max: 98.5 (27 Mar 2018 11:17)  HR: 84 (28 Mar 2018 08:02) (74 - 96)  BP: 139/74 (28 Mar 2018 04:30) (121/57 - 139/74)  BP(mean): --  RR: 18 (28 Mar 2018 04:30) (18 - 18)  SpO2: 95% (28 Mar 2018 07:00) (95% - 98%)    Physical Exam:      Constitutional: frail looking  HEENT: NC/AT, EOMI, PERRLA  Neck: supple  Back: no tenderness  Respiratory: crackles at bases; few rhonchi  Cardiovascular: S1S2 regular, no murmurs  Abdomen: soft, not tender, not distended, positive BS  Genitourinary: no LN palpable  Rectal: deferred  Musculoskeletal: no muscle tenderness, no joint swelling or tenderness  Extremities: no pedal edema; left hip wound clean  Neurological: AxOx3, moving all extremities  Skin: no rashes    Labs:                        9.9    12.19 )-----------( 362      ( 28 Mar 2018 04:56 )             31.4     03-28    134<L>  |  96  |  20  ----------------------------<  136<H>  4.2   |  30  |  0.59    Ca    8.8      28 Mar 2018 04:56         Vancomycin Level, Trough: 16.5 ug/mL (03-28 @ 04:56)                          8.9    11.95 )-----------( 269      ( 27 Mar 2018 07:52 )             28.2     03-27    138  |  96  |  21  ----------------------------<  125<H>  4.1   |  36<H>  |  0.50    Ca    8.3<L>      27 Mar 2018 05:35      Culture - Sputum (collected 26 Mar 2018 16:00)  Source: .Sputum Sputum  Gram Stain (28 Mar 2018 04:12):    Numerous polymorphonuclear leukocytes per low power field    Few Squamous epithelial cells per low power field    Few Gram Positive Cocci in Clusters seen per oil power field    Few Gram Variable Rods seen per oil power field    Culture - Urine (collected 22 Mar 2018 13:48)  Source: .Urine None  Final Report (23 Mar 2018 22:17):    No growth          Radiology:    < from: CT Angio Chest PE Protocol w/ IV Cont (03.25.18 @ 20:16) >  No pulmonary embolism.    Bilateral lower lobe pneumonia, right side greater than left.  Severe emphysema.    < end of copied text >      Advanced directives addressed: full resuscitation

## 2018-03-28 NOTE — PROGRESS NOTE ADULT - ASSESSMENT
86 y/o Female with h/o COPD, hypothyroidism, hyperlipidemia was admitted on 3/23 for increased weakness s/p mechanical fall. She normally walks with the walker, she did not take one with her when she went to the bathroom and she lost her balance and fell, sustaining LT hip fracture. She denies LOC. She underwent left hip ORIF without complications on same day. On 3/26 she was noted with SOB and cough; she was started on vancomycin IV and cefepime.    1. B/l lower lobes pneumonia. Possible aspiration pneumonia. COPD. Left hip fracture s/p ORIF.  -respiratory function is frail  -f/u BC x 2  -CT chest reviewed  -on vancomycin 750 mg IV q12h and cefepime 1 gm IV q12h # 2  -tolerating abx well so far; no side effects noted  -vancomycin trough level is therapeutic  -continue IV abx coverage  -aspiration precautions  -local wound care  -monitor temps  -f/u CBC  -supportive care  2. Other issues:   -care per medicine

## 2018-03-28 NOTE — PROGRESS NOTE ADULT - SUBJECTIVE AND OBJECTIVE BOX
HPI  HPI:  84yo/F with PMH COPD, hypothyroidism, hyperlipidemia presented s/p mechanical fall earlier today. She normally walks with the walker, she did not take one with her when she went to the bathroom and she lost her balance and fell, sustaining LT hip fracture. She denies LOC. (22 Mar 2018 15:59)      Patient is a 85y old  Female who presents with a chief complaint of s/p mechanical fall (23 Mar 2018 19:59) is now s/p IM nail 3.23 with Dr. Mireles.      Consulted by Dr. Mireles for VTE prophylaxis, risk stratification, and anticoagulation management.    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 Awake, alert, and appropriate. Discussed need for anticoagulation given fracture and repair and high risk. Patient verbalized understanding. Reports diet as "bad"- only admits to eating breakfast with very little PO intake otherwise.   3/25      FAMILY HISTORY:  Family history of lung cancer (Sibling)  Family history of stomach cancer (Sibling)    Denies any personal or familial history of clotting or bleeding disorders.    Allergies    No Known Allergies    Intolerances    REVIEW OF SYSTEMS    (  )Fever	     (  )Constipation	(  )SOB				(  )Headache	(  )Dysuria  (  )Chills	     (  )Melena	(  )Dyspnea present on exertion	                    (  )Dizziness                    (  )Polyuria  (  )Nausea	     (  )Hematochezia	(  )Cough			                    (  )Syncope   	(  )Hematuria  (  )Vomiting    (  )Chest Pain	(  )Wheezing			(  )Weakness  (  )Diarrhea     (  )Palpitations	(  )Anorexia			(  )Myalgia    All other review of systems negative: Yes    PHYSICAL EXAM:    Constitutional: Appears Well    Neurological: A& O x 2    Skin: Warm    Respiratory and Thorax: normal effort; Breath sounds: decreased with rhonchi b/l + nonprod cough  	  Cardiovascular: S1, S2, regular, NMBR	    Gastrointestinal: BS + x 4Q, nontender	    Genitourinary:  Bladder nondistended, nontender    Musculoskeletal:   General Right:   no muscle/joint tenderness,   normal tone, no joint swelling,   ROM: full	    General Left:   no muscle/joint tenderness,   normal tone, no joint swelling,   ROM: limited    Hip:  Left: Dressing CDI; aquacels x 3    Lower extrems:   Right: no calf tenderness              negative yasmany's sign               + pedal pulses    Left:   no calf tenderness              negative yasmany's sign               + pedal pulses                            9.9    12.19 )-----------( 362      ( 28 Mar 2018 04:56 )             31.4       03-28    134<L>  |  96  |  20  ----------------------------<  136<H>  4.2   |  30  |  0.59    Ca    8.8      28 Mar 2018 04:56                                9.6    12.88 )-----------( 255      ( 24 Mar 2018 05:42 )             30.9       03-24    140  |  103  |  25<H>  ----------------------------<  103<H>  4.5   |  30  |  0.45<L>    Ca    8.6      24 Mar 2018 05:42    TPro  7.4  /  Alb  2.3<L>  /  TBili  0.5  /  DBili  x   /  AST  18  /  ALT  21  /  AlkPhos  137<H>  03-22      PT/INR - ( 23 Mar 2018 04:33 )   PT: 13.8 sec;   INR: 1.27 ratio         PTT - ( 23 Mar 2018 04:33 )  PTT:28.1 sec				    MEDICATIONS  (STANDING):  ALBUTerol    0.083% 2.5 milliGRAM(s) Nebulizer every 6 hours  ALBUTerol    90 MICROgram(s) HFA Inhaler 1 Puff(s) Inhalation every 4 hours  ascorbic acid 500 milliGRAM(s) Oral two times a day  atorvastatin 40 milliGRAM(s) Oral at bedtime  buDESOnide   0.25 milliGRAM(s) Respule 0.25 milliGRAM(s) Inhalation every 12 hours  docusate sodium 100 milliGRAM(s) Oral three times a day  enoxaparin Injectable 40 milliGRAM(s) SubCutaneous daily  ferrous    sulfate 325 milliGRAM(s) Oral three times a day with meals  folic acid 1 milliGRAM(s) Oral daily  guaiFENesin  milliGRAM(s) Oral every 12 hours  lactated ringers. 1000 milliLiter(s) IV Continuous <Continuous>  levothyroxine 100 MICROGram(s) Oral daily  mirtazapine 30 milliGRAM(s) Oral at bedtime  multivitamin 1 Tablet(s) Oral daily  OLANZapine 5 milliGRAM(s) Oral at bedtime  sodium chloride 0.9%. 1000 milliLiter(s) IV Continuous <Continuous>                  Vital Signs Last 24 Hrs  T(C): 36.4 (03-28-18 @ 04:30), Max: 36.9 (03-27-18 @ 11:17)  T(F): 97.5 (03-28-18 @ 04:30), Max: 98.5 (03-27-18 @ 11:17)  HR: 84 (03-28-18 @ 08:02) (74 - 96)  BP: 139/74 (03-28-18 @ 04:30) (121/57 - 139/74)  BP(mean): --  RR: 18 (03-28-18 @ 04:30) (18 - 18)  SpO2: 95% (03-28-18 @ 07:00) (95% - 98%)    DVT Prophylaxis:  LMWH                   (x  )  Heparin SQ           (  )  Coumadin             (  )  Xarelto                  (  )  Eliquis                   (  )  Venodynes           (x  )  Ambulation          ( x )  UFH                       (  )  Contraindicated  (  )

## 2018-03-28 NOTE — PROGRESS NOTE ADULT - ASSESSMENT
This is an 85 year old female s/p IM nail with high risk for VTE due to current pulmonary function, surgery, fracture, and immobility. Moderate bleeding risk due to age.  Plan:  BID  Cont  Lovenox 40 mg SQ daily this evening  ::Resume ASA 81 mg PO daily starting tomorrow 3/25/18  ::Daily CBC/BMP  ::Venodynes b/l  ::Enc ambulation

## 2018-03-28 NOTE — PROGRESS NOTE ADULT - SUBJECTIVE AND OBJECTIVE BOX
c/c: mechanical fall    HPI:  86yo/F with PMH COPD, hypothyroidism, hyperlipidemia, psychiatric history on zyprexa, dementia, recently came home from rehab,  who presented s/p mechanical fall.  She normally walks with the walker, she did not take one with her when she went to the bathroom and she lost her balance and fell, sustaining LT hip fracture. She was admitted for further management. Underwent IM nail 3/23    3/25: Pt seen and examined this am. Felt uncomfortable, tired. No f/c/r. Tolerating po. No acute overnight events.   3/26/18 c/o SOB, cough  3/27/18- s/p RR last night for hypoxia, now off ventimask and on NC doing good  3/28/18 doing much better today, up in a chair.    Review of system- All 10 systems reviewed and is as per HPI otherwise negative.     Vital Signs Last 24 Hrs  T(C): 37.1 (28 Mar 2018 11:20), Max: 37.1 (28 Mar 2018 11:20)  T(F): 98.7 (28 Mar 2018 11:20), Max: 98.7 (28 Mar 2018 11:20)  HR: 76 (28 Mar 2018 11:22) (74 - 96)  BP: 118/69 (28 Mar 2018 11:20) (118/69 - 139/74)  BP(mean): 81 (28 Mar 2018 11:20) (81 - 81)  RR: 17 (28 Mar 2018 11:20) (17 - 18)  SpO2: 99% (28 Mar 2018 11:20) (95% - 99%)    PHYSICAL EXAM:  GENERAL: Comfortable, no acute distress  HEAD:  Atraumatic, Normocephalic  EYES: EOMI, PERRLA  HEENT: moist mucous membranes  NECK: Supple, No JVD  NERVOUS SYSTEM:  non focal, confused  CHEST/LUNG: rhonchi b/l  HEART: S1,S2+, Regular rate and rhythm;   ABDOMEN: Soft, Nontender, Nondistended; Bowel sounds present  GENITOURINARY- Voiding, no palpable bladder  EXTREMITIES:  No clubbing, cyanosis, or edema  MUSCULOSKELETAL- left hip dressing intact  SKIN-no rash    LABS:                                 9.9    12.19 )-----------( 362      ( 28 Mar 2018 04:56 )             31.4     134    |  96     |  20     ----------------------------<  136    4.2     |  30     |  0.59     Ca    8.8        28 Mar 2018 04:56    Radiology:  EXAM:  CT ANGIO CHEST PE PROTOCOL IC                        PROCEDURE DATE:  03/25/2018    INTERPRETATION:  Clinical information: Dyspnea    TECHNIQUE:  Axial computed tomographic angiography images of the chest with   intravenous   contrast using pulmonary embolism protocol.     MIP reconstructed images were created and reviewed.  Coronal and sagittal reformatted images were created and reviewed.    CONTRAST:  90 mL of Omnipaque 350 administered intravenously with 10 cc discarded.    COMPARISON:  CT CHEST 2018-02-22 08:57    FINDINGS:  PULMONARY ARTERIES: Adequately opacified and no evidence of pulmonary   embolism.  AORTA: Moderate atherosclerotic arterial calcifications, including   coronary artery calcification. Normal caliberaorta.   LUNGS: Severe emphysema. Moderate sized consolidation at  the right lung base and mild patchy left lower lobe airspace disease.   Mild chronic lingular atelectasis.  PLEURAL SPACE: No pleural effusion or pneumothorax.  HEART: Heart is normalin size. No pericardial effusion.  BONES/JOINTS:  No acute fracture.  No dislocation. Moderate wedge   compression deformity at T7.  SOFT TISSUES:  Unremarkable.  LYMPH NODES:  Unremarkable.  No enlarged lymph nodes.  UPPER ABDOMEN: 1 cm nonobstructing right renal stone.    IMPRESSION:       No pulmonary embolism.  Bilateral lower lobe pneumonia, right side greater than left.  Severe emphysema.    EXAM:  XR CHEST AP OR PA 1V                        PROCEDURE DATE:  03/26/2018    INTERPRETATION:  Follow-up.    AP chest. Prior 3/25/2018.  Rotated to right. No change heart mediastinum. No significant interval   change in the airspace infiltrate in the right mid to lower lung field.   Generalized chronic accentuation bronchovascular markings similar to   prior. No pleural effusion, pneumothorax or other new abnormality.    Impression: No significant interval change.    MEDICATIONS  (STANDING):  ascorbic acid 500 milliGRAM(s) Oral two times a day  aspirin  chewable 81 milliGRAM(s) Oral daily  atorvastatin 40 milliGRAM(s) Oral at bedtime  buDESOnide   0.25 milliGRAM(s) Respule 0.25 milliGRAM(s) Inhalation every 12 hours  buDESOnide 160 MICROgram(s)/formoterol 4.5 MICROgram(s) Inhaler 2 Puff(s) Inhalation two times a day  cefepime  IVPB      cefepime  IVPB 1000 milliGRAM(s) IV Intermittent once  cefepime  IVPB 1000 milliGRAM(s) IV Intermittent every 12 hours  docusate sodium 100 milliGRAM(s) Oral three times a day  enoxaparin Injectable 40 milliGRAM(s) SubCutaneous daily  ferrous    sulfate 325 milliGRAM(s) Oral three times a day with meals  folic acid 1 milliGRAM(s) Oral daily  guaiFENesin  milliGRAM(s) Oral every 12 hours  ipratropium    for Nebulization 500 MICROGram(s) Nebulizer every 6 hours  levothyroxine 100 MICROGram(s) Oral daily  mirtazapine 30 milliGRAM(s) Oral at bedtime  multivitamin 1 Tablet(s) Oral daily  OLANZapine 5 milliGRAM(s) Oral at bedtime  tiotropium 18 MICROgram(s) Capsule 1 Capsule(s) Inhalation daily    MEDICATIONS  (PRN):  acetaminophen   Tablet 650 milliGRAM(s) Oral every 6 hours PRN For Temp greater than 38 C (100.4 F) or headache  benzocaine 15 mG/menthol 3.6 mG Lozenge 1 Lozenge Oral every 3 hours PRN Sore Throat  diphenhydrAMINE   Capsule 25 milliGRAM(s) Oral every 6 hours PRN Rash and/or Itching  ondansetron Injectable 4 milliGRAM(s) IV Push every 6 hours PRN Nausea and/or Vomiting  oxyCODONE    IR 5 milliGRAM(s) Oral every 4 hours PRN Moderate Pain (4 - 6)  zaleplon 5 milliGRAM(s) Oral at bedtime PRN Insomnia    ASSESSMENT AND PLAN:  #S/p mechanical fall with LT hip fracture s/p IMN/ anemia acute blood loss likely 2 to fracture  Ortho f/u appreciated  PT as tolerated  Monitor HH  Pain meds prn  Bowel regimen  Incentive spirometry  AC by Lovenox    #Pneumonia likely HCAP/ Acute resp failure/hypoxia likely 2 to PNA  Imaging studies reviewed  On BiPAP intermittently  Pulm mary appreciated, d/w DR Ab hernandez appreciated- cont IV abx as per ID  Swallow eval appreciated- on regular diet  Switch from IV steroids to PO prednisone in am    #COPD acute exacerbation/chr resp failure on home O2  Pulm f/u appreciated  Started on IV steroids - switch to PO prednisone in am  Nebs    #Hypothyroidism/hyperlipidemia  Stable    #Dispo- treat pneumonia. Not ready for discharge yet. Possible SOPHIE in 1-2 days if continues to improve

## 2018-03-29 LAB
ANION GAP SERPL CALC-SCNC: 7 MMOL/L — SIGNIFICANT CHANGE UP (ref 5–17)
BUN SERPL-MCNC: 15 MG/DL — SIGNIFICANT CHANGE UP (ref 7–23)
CALCIUM SERPL-MCNC: 8.7 MG/DL — SIGNIFICANT CHANGE UP (ref 8.5–10.1)
CHLORIDE SERPL-SCNC: 99 MMOL/L — SIGNIFICANT CHANGE UP (ref 96–108)
CO2 SERPL-SCNC: 32 MMOL/L — HIGH (ref 22–31)
CREAT SERPL-MCNC: 0.4 MG/DL — LOW (ref 0.5–1.3)
CULTURE RESULTS: SIGNIFICANT CHANGE UP
GLUCOSE SERPL-MCNC: 138 MG/DL — HIGH (ref 70–99)
HCT VFR BLD CALC: 30.1 % — LOW (ref 34.5–45)
HGB BLD-MCNC: 9.7 G/DL — LOW (ref 11.5–15.5)
MCHC RBC-ENTMCNC: 27.4 PG — SIGNIFICANT CHANGE UP (ref 27–34)
MCHC RBC-ENTMCNC: 32.2 GM/DL — SIGNIFICANT CHANGE UP (ref 32–36)
MCV RBC AUTO: 85 FL — SIGNIFICANT CHANGE UP (ref 80–100)
NRBC # BLD: 0 /100 WBCS — SIGNIFICANT CHANGE UP (ref 0–0)
PLATELET # BLD AUTO: 353 K/UL — SIGNIFICANT CHANGE UP (ref 150–400)
POTASSIUM SERPL-MCNC: 4 MMOL/L — SIGNIFICANT CHANGE UP (ref 3.5–5.3)
POTASSIUM SERPL-SCNC: 4 MMOL/L — SIGNIFICANT CHANGE UP (ref 3.5–5.3)
RBC # BLD: 3.54 M/UL — LOW (ref 3.8–5.2)
RBC # FLD: 13.8 % — SIGNIFICANT CHANGE UP (ref 10.3–14.5)
SODIUM SERPL-SCNC: 138 MMOL/L — SIGNIFICANT CHANGE UP (ref 135–145)
SPECIMEN SOURCE: SIGNIFICANT CHANGE UP
WBC # BLD: 14.37 K/UL — HIGH (ref 3.8–10.5)
WBC # FLD AUTO: 14.37 K/UL — HIGH (ref 3.8–10.5)

## 2018-03-29 PROCEDURE — 99233 SBSQ HOSP IP/OBS HIGH 50: CPT

## 2018-03-29 RX ADMIN — ALBUTEROL 1 PUFF(S): 90 AEROSOL, METERED ORAL at 23:12

## 2018-03-29 RX ADMIN — Medication 500 MICROGRAM(S): at 13:16

## 2018-03-29 RX ADMIN — Medication 500 MICROGRAM(S): at 20:01

## 2018-03-29 RX ADMIN — CEFEPIME 100 MILLIGRAM(S): 1 INJECTION, POWDER, FOR SOLUTION INTRAMUSCULAR; INTRAVENOUS at 13:22

## 2018-03-29 RX ADMIN — Medication 0.25 MILLIGRAM(S): at 08:15

## 2018-03-29 RX ADMIN — CEFEPIME 100 MILLIGRAM(S): 1 INJECTION, POWDER, FOR SOLUTION INTRAMUSCULAR; INTRAVENOUS at 23:40

## 2018-03-29 RX ADMIN — ALBUTEROL 1 PUFF(S): 90 AEROSOL, METERED ORAL at 15:14

## 2018-03-29 RX ADMIN — Medication 81 MILLIGRAM(S): at 13:16

## 2018-03-29 RX ADMIN — Medication 325 MILLIGRAM(S): at 13:16

## 2018-03-29 RX ADMIN — ALBUTEROL 1 PUFF(S): 90 AEROSOL, METERED ORAL at 20:00

## 2018-03-29 RX ADMIN — MIRTAZAPINE 30 MILLIGRAM(S): 45 TABLET, ORALLY DISINTEGRATING ORAL at 21:25

## 2018-03-29 RX ADMIN — Medication 500 MICROGRAM(S): at 01:35

## 2018-03-29 RX ADMIN — Medication 0.25 MILLIGRAM(S): at 20:00

## 2018-03-29 RX ADMIN — ALBUTEROL 1 PUFF(S): 90 AEROSOL, METERED ORAL at 15:17

## 2018-03-29 RX ADMIN — ENOXAPARIN SODIUM 40 MILLIGRAM(S): 100 INJECTION SUBCUTANEOUS at 13:17

## 2018-03-29 RX ADMIN — Medication 600 MILLIGRAM(S): at 05:47

## 2018-03-29 RX ADMIN — ALBUTEROL 1 PUFF(S): 90 AEROSOL, METERED ORAL at 11:13

## 2018-03-29 RX ADMIN — Medication 1 TABLET(S): at 13:16

## 2018-03-29 RX ADMIN — Medication 325 MILLIGRAM(S): at 18:51

## 2018-03-29 RX ADMIN — Medication 500 MICROGRAM(S): at 08:15

## 2018-03-29 RX ADMIN — Medication 1 MILLIGRAM(S): at 13:17

## 2018-03-29 RX ADMIN — BUDESONIDE AND FORMOTEROL FUMARATE DIHYDRATE 2 PUFF(S): 160; 4.5 AEROSOL RESPIRATORY (INHALATION) at 20:01

## 2018-03-29 RX ADMIN — CEFEPIME 100 MILLIGRAM(S): 1 INJECTION, POWDER, FOR SOLUTION INTRAMUSCULAR; INTRAVENOUS at 00:26

## 2018-03-29 RX ADMIN — OLANZAPINE 5 MILLIGRAM(S): 15 TABLET, FILM COATED ORAL at 21:25

## 2018-03-29 RX ADMIN — Medication 100 MICROGRAM(S): at 05:47

## 2018-03-29 RX ADMIN — ATORVASTATIN CALCIUM 40 MILLIGRAM(S): 80 TABLET, FILM COATED ORAL at 21:25

## 2018-03-29 RX ADMIN — Medication 600 MILLIGRAM(S): at 18:50

## 2018-03-29 RX ADMIN — Medication 40 MILLIGRAM(S): at 05:47

## 2018-03-29 RX ADMIN — ALBUTEROL 1 PUFF(S): 90 AEROSOL, METERED ORAL at 08:16

## 2018-03-29 RX ADMIN — Medication 500 MILLIGRAM(S): at 18:50

## 2018-03-29 NOTE — PROGRESS NOTE ADULT - SUBJECTIVE AND OBJECTIVE BOX
HPI:  86yo/F with PMH COPD, hypothyroidism, hyperlipidemia presented s/p mechanical fall earlier today. She normally walks with the walker, she did not take one with her when she went to the bathroom and she lost her balance and fell, sustaining LT hip fracture. She denies LOC. (22 Mar 2018 15:59)      Patient is a 85y old  Female who presents with a chief complaint of s/p mechanical fall (23 Mar 2018 19:59) is now s/p IM nail 3.23 with Dr. Mireles.      Consulted by Dr. Mireles for VTE prophylaxis, risk stratification, and anticoagulation management.    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 Awake, alert, and appropriate. Discussed need for anticoagulation given fracture and repair and high risk. Patient verbalized understanding. Reports diet as "bad"- only admits to eating breakfast with very little PO intake otherwise.       FAMILY HISTORY:  Family history of lung cancer (Sibling)  Family history of stomach cancer (Sibling)    Denies any personal or familial history of clotting or bleeding disorders.    Allergies    No Known Allergies    Intolerances    REVIEW OF SYSTEMS    (  )Fever	     (  )Constipation	(  )SOB				(  )Headache	(  )Dysuria  (  )Chills	     (  )Melena	(  )Dyspnea present on exertion	                    (  )Dizziness                    (  )Polyuria  (  )Nausea	     (  )Hematochezia	(  )Cough			                    (  )Syncope   	(  )Hematuria  (  )Vomiting    (  )Chest Pain	(  )Wheezing			(  )Weakness  (  )Diarrhea     (  )Palpitations	(  )Anorexia			(  )Myalgia    All other review of systems negative: Yes    PHYSICAL EXAM:    Constitutional: Appears Well    Neurological: A& O x 2    Skin: Warm    Respiratory and Thorax: normal effort; Breath sounds: decreased with rhonchi b/l + nonprod cough  	  Cardiovascular: S1, S2, regular, NMBR	    Gastrointestinal: BS + x 4Q, nontender	    Genitourinary:  Bladder nondistended, nontender    Musculoskeletal:   General Right:   no muscle/joint tenderness,   normal tone, no joint swelling,   ROM: full	    General Left:   no muscle/joint tenderness,   normal tone, no joint swelling,   ROM: limited    Hip:  Left: Dressing CDI; aquacels x 3    Lower extrems:   Right: no calf tenderness              negative yasmany's sign               + pedal pulses    Left:   no calf tenderness              negative yasmany's sign               + pedal pulses                          9.7    14.37 )-----------( 353      ( 29 Mar 2018 09:09 )             30.1       03-29    138  |  99  |  15  ----------------------------<  138<H>  4.0   |  32<H>  |  0.40<L>    Ca    8.7      29 Mar 2018 09:09                                9.9    12.19 )-----------( 362      ( 28 Mar 2018 04:56 )             31.4       03-28    134<L>  |  96  |  20  ----------------------------<  136<H>  4.2   |  30  |  0.59    Ca    8.8      28 Mar 2018 04:56                                9.6    12.88 )-----------( 255      ( 24 Mar 2018 05:42 )             30.9       03-24    140  |  103  |  25<H>  ----------------------------<  103<H>  4.5   |  30  |  0.45<L>    Ca    8.6      24 Mar 2018 05:42    TPro  7.4  /  Alb  2.3<L>  /  TBili  0.5  /  DBili  x   /  AST  18  /  ALT  21  /  AlkPhos  137<H>  03-22      PT/INR - ( 23 Mar 2018 04:33 )   PT: 13.8 sec;   INR: 1.27 ratio         PTT - ( 23 Mar 2018 04:33 )  PTT:28.1 sec				    MEDICATIONS  (STANDING):  ALBUTerol    0.083% 2.5 milliGRAM(s) Nebulizer every 6 hours  ALBUTerol    90 MICROgram(s) HFA Inhaler 1 Puff(s) Inhalation every 4 hours  ascorbic acid 500 milliGRAM(s) Oral two times a day  atorvastatin 40 milliGRAM(s) Oral at bedtime  buDESOnide   0.25 milliGRAM(s) Respule 0.25 milliGRAM(s) Inhalation every 12 hours  docusate sodium 100 milliGRAM(s) Oral three times a day  enoxaparin Injectable 40 milliGRAM(s) SubCutaneous daily  ferrous    sulfate 325 milliGRAM(s) Oral three times a day with meals  folic acid 1 milliGRAM(s) Oral daily  guaiFENesin  milliGRAM(s) Oral every 12 hours  lactated ringers. 1000 milliLiter(s) IV Continuous <Continuous>  levothyroxine 100 MICROGram(s) Oral daily  mirtazapine 30 milliGRAM(s) Oral at bedtime  multivitamin 1 Tablet(s) Oral daily  OLANZapine 5 milliGRAM(s) Oral at bedtime  sodium chloride 0.9%. 1000 milliLiter(s) IV Continuous <Continuous>      Vital Signs Last 24 Hrs  T(C): 36.5 (03-29-18 @ 11:21), Max: 37.1 (03-28-18 @ 17:48)  T(F): 97.7 (03-29-18 @ 11:21), Max: 98.8 (03-28-18 @ 17:48)  HR: 88 (03-29-18 @ 13:18) (70 - 95)  BP: 123/69 (03-29-18 @ 11:21) (123/69 - 146/67)  BP(mean): 80 (03-29-18 @ 09:45) (80 - 80)  RR: 20 (03-29-18 @ 11:21) (16 - 20)  SpO2: 95% (03-29-18 @ 11:21) (94% - 98%)      DVT Prophylaxis:  LMWH                   (x  )  Heparin SQ           (  )  Coumadin             (  )  Xarelto                  (  )  Eliquis                   (  )  Venodynes           (x  )  Ambulation          ( x )  UFH                       (  )  Contraindicated  (  )

## 2018-03-29 NOTE — PROGRESS NOTE ADULT - SUBJECTIVE AND OBJECTIVE BOX
Patient is a 85y old  Female who presents with a chief complaint of s/p mechanical fall (23 Mar 2018 19:59)    Date of service: 03-29-18 @ 10:13    Lying in bed in NAD  Sleepy this AM  Weak looking  No SOB at rest    ROS: no fever or chills; denies dizziness, no HA, no abdominal pain, no diarrhea or constipation; no dysuria, no legs pain    MEDICATIONS  (STANDING):  ALBUTerol    90 MICROgram(s) HFA Inhaler 1 Puff(s) Inhalation every 4 hours  ascorbic acid 500 milliGRAM(s) Oral two times a day  aspirin  chewable 81 milliGRAM(s) Oral daily  atorvastatin 40 milliGRAM(s) Oral at bedtime  buDESOnide   0.25 milliGRAM(s) Respule 0.25 milliGRAM(s) Inhalation every 12 hours  buDESOnide 160 MICROgram(s)/formoterol 4.5 MICROgram(s) Inhaler 2 Puff(s) Inhalation two times a day  cefepime  IVPB      cefepime  IVPB 1000 milliGRAM(s) IV Intermittent every 12 hours  docusate sodium 100 milliGRAM(s) Oral three times a day  enoxaparin Injectable 40 milliGRAM(s) SubCutaneous daily  ferrous    sulfate 325 milliGRAM(s) Oral three times a day with meals  folic acid 1 milliGRAM(s) Oral daily  guaiFENesin  milliGRAM(s) Oral every 12 hours  ipratropium    for Nebulization 500 MICROGram(s) Nebulizer every 6 hours  levothyroxine 100 MICROGram(s) Oral daily  mirtazapine 30 milliGRAM(s) Oral at bedtime  multivitamin 1 Tablet(s) Oral daily  OLANZapine 5 milliGRAM(s) Oral at bedtime  predniSONE   Tablet 40 milliGRAM(s) Oral daily  tiotropium 18 MICROgram(s) Capsule 1 Capsule(s) Inhalation daily    MEDICATIONS  (PRN):  acetaminophen   Tablet 650 milliGRAM(s) Oral every 6 hours PRN For Temp greater than 38 C (100.4 F) or headache  benzocaine 15 mG/menthol 3.6 mG Lozenge 1 Lozenge Oral every 3 hours PRN Sore Throat  diphenhydrAMINE   Capsule 25 milliGRAM(s) Oral every 6 hours PRN Rash and/or Itching  ondansetron Injectable 4 milliGRAM(s) IV Push every 6 hours PRN Nausea and/or Vomiting  oxyCODONE    IR 5 milliGRAM(s) Oral every 4 hours PRN Moderate Pain (4 - 6)  zaleplon 5 milliGRAM(s) Oral at bedtime PRN Insomnia      Vital Signs Last 24 Hrs  T(C): 36.3 (29 Mar 2018 09:45), Max: 37.1 (28 Mar 2018 11:20)  T(F): 97.4 (29 Mar 2018 09:45), Max: 98.8 (28 Mar 2018 17:48)  HR: 80 (29 Mar 2018 09:45) (70 - 95)  BP: 130/63 (29 Mar 2018 09:45) (118/69 - 146/67)  BP(mean): 80 (29 Mar 2018 09:45) (80 - 81)  RR: 16 (29 Mar 2018 09:45) (16 - 17)  SpO2: 94% (29 Mar 2018 09:45) (94% - 99%)    Physical Exam:      Constitutional: frail looking  HEENT: NC/AT, EOMI, PERRLA  Neck: supple  Back: no tenderness  Respiratory:  few rhonchi  Cardiovascular: S1S2 regular, no murmurs  Abdomen: soft, not tender, not distended, positive BS  Genitourinary: no LN palpable  Rectal: deferred  Musculoskeletal: no muscle tenderness, no joint swelling or tenderness  Extremities: no pedal edema; left hip wound clean  Neurological: AxOx3, moving all extremities  Skin: no rashes    Labs:                        9.7    14.37 )-----------( 353      ( 29 Mar 2018 09:09 )             30.1     03-29    138  |  99  |  15  ----------------------------<  138<H>  4.0   |  32<H>  |  0.40<L>    Ca    8.7      29 Mar 2018 09:09         Vancomycin Level, Trough: 14.2 ug/mL (03-28 @ 21:57)  Vancomycin Level, Trough: 16.5 ug/mL (03-28 @ 04:56)                                   8.9    11.95 )-----------( 269      ( 27 Mar 2018 07:52 )             28.2     03-27    138  |  96  |  21  ----------------------------<  125<H>  4.1   |  36<H>  |  0.50    Ca    8.3<L>      27 Mar 2018 05:35      Culture - Sputum . (03.26.18 @ 16:00)    Gram Stain:   Numerous polymorphonuclear leukocytes per low power field  Few Squamous epithelial cells per low power field  Few Gram Positive Cocci in Clusters seen per oil power field  Few Gram Variable Rods seen per oil power field    Specimen Source: .Sputum Sputum    Culture Results:   Rare Pseudomonas aeruginosa  Normal Respiratory Brenda present        Culture - Urine (collected 22 Mar 2018 13:48)  Source: .Urine None  Final Report (23 Mar 2018 22:17):    No growth          Radiology:    < from: CT Angio Chest PE Protocol w/ IV Cont (03.25.18 @ 20:16) >  No pulmonary embolism.    Bilateral lower lobe pneumonia, right side greater than left.  Severe emphysema.    < end of copied text >      Advanced directives addressed: full resuscitation

## 2018-03-29 NOTE — PROGRESS NOTE ADULT - SUBJECTIVE AND OBJECTIVE BOX
HPI:  84yo/F with PMH COPD, hypothyroidism, hyperlipidemia presented s/p mechanical fall. She normally walks with the walker, she did not take one with her when she went to the bathroom and she lost her balance and fell, sustaining LT hip fracture. Patient is s/p hip pinning. Brennen has a hx COPD on home O2. She has 60+ pack year smoking history. Mrs. Kong has been desaturating off Os. Cxr shows non acute infiltrates. Previous CT scan chest shows severe COPD. According to daughter, patient becomes agitated after albuterol neb.    3/26: has cough, scant sputum.  CTA chest yesterday: no PE, positve RLL consolidation c/w PNA, ? aspiration.   3/27: events noted, rapid response last PM.  is tachypneic this AM.  has congested sounding cough.  CXR 3/26 R sided infil/PNA.  O2 now down to 3L w/ O2 sat 96 %.   3/28: sitting up in chair, breathing improved from yest, no distress.   3/29: drowsy this AM. congested cough.     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  Hypothyroidism, unspecified type  Shortening, leg, congenital, right  No significant past surgical history      MEDICATIONS  (STANDING):  ALBUTerol    0.083% 2.5 milliGRAM(s) Nebulizer every 6 hours  ALBUTerol    90 MICROgram(s) HFA Inhaler 1 Puff(s) Inhalation every 4 hours  ascorbic acid 500 milliGRAM(s) Oral two times a day  aspirin  chewable 81 milliGRAM(s) Oral daily  atorvastatin 40 milliGRAM(s) Oral at bedtime  buDESOnide   0.25 milliGRAM(s) Respule 0.25 milliGRAM(s) Inhalation every 12 hours  buDESOnide 160 MICROgram(s)/formoterol 4.5 MICROgram(s) Inhaler 2 Puff(s) Inhalation two times a day  docusate sodium 100 milliGRAM(s) Oral three times a day  enoxaparin Injectable 40 milliGRAM(s) SubCutaneous daily  ferrous    sulfate 325 milliGRAM(s) Oral three times a day with meals  folic acid 1 milliGRAM(s) Oral daily  guaiFENesin  milliGRAM(s) Oral every 12 hours  lactated ringers. 1000 milliLiter(s) (75 mL/Hr) IV Continuous <Continuous>  levothyroxine 100 MICROGram(s) Oral daily  mirtazapine 30 milliGRAM(s) Oral at bedtime  multivitamin 1 Tablet(s) Oral daily  OLANZapine 5 milliGRAM(s) Oral at bedtime  tiotropium 18 MICROgram(s) Capsule 1 Capsule(s) Inhalation daily    MEDICATIONS  (PRN):  acetaminophen   Tablet 650 milliGRAM(s) Oral every 6 hours PRN For Temp greater than 38 C (100.4 F) or headache  benzocaine 15 mG/menthol 3.6 mG Lozenge 1 Lozenge Oral every 3 hours PRN Sore Throat  diphenhydrAMINE   Capsule 25 milliGRAM(s) Oral every 6 hours PRN Rash and/or Itching  ondansetron Injectable 4 milliGRAM(s) IV Push every 6 hours PRN Nausea and/or Vomiting  oxyCODONE    IR 5 milliGRAM(s) Oral every 4 hours PRN Moderate Pain (4 - 6)  zaleplon 5 milliGRAM(s) Oral at bedtime PRN Insomnia      Allergies    No Known Allergies    Intolerances        SOCIAL HISTORY: Denies tobacco, etoh abuse or illicit drug use    FAMILY HISTORY:  Family history of lung cancer (Sibling)  Family history of stomach cancer (Sibling)      Vital Signs Last 24 Hrs  T(C): 36.9 (24 Mar 2018 23:23), Max: 36.9 (24 Mar 2018 23:23)  T(F): 98.4 (24 Mar 2018 23:23), Max: 98.4 (24 Mar 2018 23:23)  HR: 89 (25 Mar 2018 11:37) (89 - 102)  BP: 121/64 (24 Mar 2018 23:23) (121/64 - 125/67)  BP(mean): --  RR: 18 (24 Mar 2018 23:23) (18 - 18)  SpO2: 95% (25 Mar 2018 11:55) (73% - 96%)    REVIEW OF SYSTEMS:    CONSTITUTIONAL:  As per HPI.  SKIN: no rashes  HEENT:  Eyes:  No diplopia or blurred vision. ENT:  No earache, sore throat or runny nose.  CARDIOVASCULAR:  No pressure, squeezing, tightness, heaviness or aching about the chest, neck, axilla or epigastrium.  RESPIRATORY:  see above.  GASTROINTESTINAL:  No nausea, vomiting or diarrhea.  GENITOURINARY:  No dysuria, frequency or urgency.  MUSCULOSKELETAL:  As per HPI.  SKIN:  No change in skin, hair or nails.  NEUROLOGIC:  No paresthesias, fasciculations, seizures or weakness.  PSYCHIATRIC:  No disorder of thought or mood.  ENDOCRINE:  No heat or cold intolerance, polyuria or polydipsia.  HEMATOLOGICAL:  No easy bruising or bleedings:  .     PHYSICAL EXAMINATION:    GENERAL APPEARANCE:  Pt. is not currently dyspneic, in no distress. Pt. is alert, oriented, and pleasant.  HEENT:  Pupils are normal and react normally. No icterus. Mucous membranes well colored.  NECK:  Supple. No lymphadenopathy. Jugular venous pressure not elevated. Carotids equal.   HEART:  S1S2 reg w 2/6 systolic murmur  CHEST:  decreased aud BS's bilat. few crackles aud bilat.  ABDOMEN:  Soft and nontender.   EXTREMITIES:  There is no cyanosis, clubbing or edema.   SKIN:  No rash or significant lesions are noted.  CNS: AAO x 3    LABS:                        9.3    10.81 )-----------( 231      ( 25 Mar 2018 06:22 )             29.5     03-25    139  |  102  |  25<H>  ----------------------------<  125<H>  4.0   |  30  |  0.48<L>    Ca    8.4<L>      25 Mar 2018 06:22                    RADIOLOGY & ADDITIONAL STUDIES:     EXAM:  CT ANGIO CHEST PE PROTOCOL IC                            PROCEDURE DATE:  03/25/2018          INTERPRETATION:  Clinical information: Dyspnea    TECHNIQUE:  Axial computed tomographic angiography images of the chest with   intravenous   contrast using pulmonary embolism protocol.     MIP reconstructed images were created and reviewed.  Coronal and sagittal reformatted images were created and reviewed.    CONTRAST:  90 mL of Omnipaque 350 administered intravenously with 10 cc discarded.    COMPARISON:  CT CHEST 2018-02-22 08:57    FINDINGS:  PULMONARY ARTERIES: Adequately opacified and no evidence of pulmonary   embolism.  AORTA: Moderate atherosclerotic arterial calcifications, including   coronary artery calcification. Normal caliberaorta.   LUNGS: Severe emphysema. Moderate sized consolidation at  the right lung base and mild patchy left lower lobe airspace disease.   Mild chronic lingular atelectasis.  PLEURAL SPACE: No pleural effusion or pneumothorax.  HEART: Heart is normalin size. No pericardial effusion.  BONES/JOINTS:  No acute fracture.  No dislocation. Moderate wedge   compression deformity at T7.  SOFT TISSUES:  Unremarkable.  LYMPH NODES:  Unremarkable.  No enlarged lymph nodes.  UPPER ABDOMEN: 1 cm nonobstructing right renal stone.    IMPRESSION:       No pulmonary embolism.    Bilateral lower lobe pneumonia, right side greater than left.    Severe emphysema.

## 2018-03-29 NOTE — PROGRESS NOTE ADULT - ASSESSMENT
84 y/o Female with h/o COPD, hypothyroidism, hyperlipidemia was admitted on 3/23 for increased weakness s/p mechanical fall. She normally walks with the walker, she did not take one with her when she went to the bathroom and she lost her balance and fell, sustaining LT hip fracture. She denies LOC. She underwent left hip ORIF without complications on same day. On 3/26 she was noted with SOB and cough; she was started on vancomycin IV and cefepime.    1. B/l lower lobes pneumonia. Possible aspiration pneumonia with rare PSAE in respiratory culture. COPD. Left hip fracture s/p ORIF.  -respiratory function is frail  -f/u BC x 2  -CT chest reviewed  -on vancomycin 750 mg IV q12h and cefepime 1 gm IV q12h # 3  -tolerating abx well so far; no side effects noted  -d/c vancomycin   -continue IV abx coverage with cefepime for now  -aspiration precautions  -local wound care  -monitor temps  -f/u CBC  -supportive care  2. Other issues:   -care per medicine

## 2018-03-29 NOTE — PROGRESS NOTE ADULT - PROBLEM SELECTOR PROBLEM 8
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

## 2018-03-29 NOTE — PROGRESS NOTE ADULT - ASSESSMENT
- RLL consolidation greater than LLL patchy findings on CTA of chest.  Treat for PNA (HCAP), ? aspiration.  Aspiration precautions,  Abx's per ID.     - severe COPD.  Decrease NC O2 as marce keeping O2 sat greater than 93%.  BIPAP prn during daytime and during sleep.  Did not use BIPAP last PM, plan to use today..  Encourage deep coughs.  Continue bronchodilators.  On p.o. prednisone.      - continue atrovent nebs, symbicort,  proaire via spacer 1 puff q 4 hrs.  - dvt proph

## 2018-03-29 NOTE — PROGRESS NOTE ADULT - SUBJECTIVE AND OBJECTIVE BOX
c/c: mechanical fall    HPI:  86yo/F with PMH COPD, hypothyroidism, hyperlipidemia, psychiatric history on zyprexa, dementia, recently came home from rehab,  who presented s/p mechanical fall.  She normally walks with the walker, she did not take one with her when she went to the bathroom and she lost her balance and fell, sustaining LT hip fracture. She was admitted for further management. Underwent IM nail 3/23    3/25: Pt seen and examined this am. Felt uncomfortable, tired. No f/c/r. Tolerating po. No acute overnight events.   3/26/18 c/o SOB, cough  3/27/18- s/p RR last night for hypoxia, now off ventimask and on NC doing good  3/28/18 doing much better today, up in a chair.  3/29/18 no acute events    Review of system- All 10 systems reviewed and is as per HPI otherwise negative.     Vital Signs Last 24 Hrs  T(C): 36.5 (29 Mar 2018 11:21), Max: 37.1 (28 Mar 2018 17:48)  T(F): 97.7 (29 Mar 2018 11:21), Max: 98.8 (28 Mar 2018 17:48)  HR: 88 (29 Mar 2018 13:18) (70 - 95)  BP: 123/69 (29 Mar 2018 11:21) (123/69 - 146/67)  BP(mean): 80 (29 Mar 2018 09:45) (80 - 80)  RR: 20 (29 Mar 2018 11:21) (16 - 20)  SpO2: 95% (29 Mar 2018 11:21) (94% - 98%)    PHYSICAL EXAM:  GENERAL: Comfortable, no acute distress  HEAD:  Atraumatic, Normocephalic  EYES: EOMI, PERRLA  HEENT: moist mucous membranes  NECK: Supple, No JVD  NERVOUS SYSTEM:  non focal, confused  CHEST/LUNG: rhonchi b/l  HEART: S1,S2+, Regular rate and rhythm;   ABDOMEN: Soft, Nontender, Nondistended; Bowel sounds present  GENITOURINARY- Voiding, no palpable bladder  EXTREMITIES:  No clubbing, cyanosis, or edema  MUSCULOSKELETAL- left hip dressing intact  SKIN-no rash    LABS:                        9.7    14.37 )-----------( 353      ( 29 Mar 2018 09:09 )             30.1     138    |  99     |  15     ----------------------------<  138    4.0     |  32     |  0.40   Ca    8.7        29 Mar 2018 09:09    Radiology:  EXAM:  CT ANGIO CHEST PE PROTOCOL IC                        PROCEDURE DATE:  03/25/2018    INTERPRETATION:  Clinical information: Dyspnea    TECHNIQUE:  Axial computed tomographic angiography images of the chest with   intravenous   contrast using pulmonary embolism protocol.     MIP reconstructed images were created and reviewed.  Coronal and sagittal reformatted images were created and reviewed.    CONTRAST:  90 mL of Omnipaque 350 administered intravenously with 10 cc discarded.    COMPARISON:  CT CHEST 2018-02-22 08:57    FINDINGS:  PULMONARY ARTERIES: Adequately opacified and no evidence of pulmonary   embolism.  AORTA: Moderate atherosclerotic arterial calcifications, including   coronary artery calcification. Normal caliberaorta.   LUNGS: Severe emphysema. Moderate sized consolidation at  the right lung base and mild patchy left lower lobe airspace disease.   Mild chronic lingular atelectasis.  PLEURAL SPACE: No pleural effusion or pneumothorax.  HEART: Heart is normalin size. No pericardial effusion.  BONES/JOINTS:  No acute fracture.  No dislocation. Moderate wedge   compression deformity at T7.  SOFT TISSUES:  Unremarkable.  LYMPH NODES:  Unremarkable.  No enlarged lymph nodes.  UPPER ABDOMEN: 1 cm nonobstructing right renal stone.    IMPRESSION:       No pulmonary embolism.  Bilateral lower lobe pneumonia, right side greater than left.  Severe emphysema.    EXAM:  XR CHEST AP OR PA 1V                        PROCEDURE DATE:  03/26/2018    INTERPRETATION:  Follow-up.    AP chest. Prior 3/25/2018.  Rotated to right. No change heart mediastinum. No significant interval   change in the airspace infiltrate in the right mid to lower lung field.   Generalized chronic accentuation bronchovascular markings similar to   prior. No pleural effusion, pneumothorax or other new abnormality.    Impression: No significant interval change.    MEDICATIONS  (STANDING):  ALBUTerol    90 MICROgram(s) HFA Inhaler 1 Puff(s) Inhalation every 4 hours  ascorbic acid 500 milliGRAM(s) Oral two times a day  aspirin  chewable 81 milliGRAM(s) Oral daily  atorvastatin 40 milliGRAM(s) Oral at bedtime  buDESOnide   0.25 milliGRAM(s) Respule 0.25 milliGRAM(s) Inhalation every 12 hours  buDESOnide 160 MICROgram(s)/formoterol 4.5 MICROgram(s) Inhaler 2 Puff(s) Inhalation two times a day  cefepime  IVPB      cefepime  IVPB 1000 milliGRAM(s) IV Intermittent every 12 hours  docusate sodium 100 milliGRAM(s) Oral three times a day  enoxaparin Injectable 40 milliGRAM(s) SubCutaneous daily  ferrous    sulfate 325 milliGRAM(s) Oral three times a day with meals  folic acid 1 milliGRAM(s) Oral daily  guaiFENesin  milliGRAM(s) Oral every 12 hours  ipratropium    for Nebulization 500 MICROGram(s) Nebulizer every 6 hours  levothyroxine 100 MICROGram(s) Oral daily  mirtazapine 30 milliGRAM(s) Oral at bedtime  multivitamin 1 Tablet(s) Oral daily  OLANZapine 5 milliGRAM(s) Oral at bedtime  predniSONE   Tablet 40 milliGRAM(s) Oral daily  tiotropium 18 MICROgram(s) Capsule 1 Capsule(s) Inhalation daily    MEDICATIONS  (PRN):  acetaminophen   Tablet 650 milliGRAM(s) Oral every 6 hours PRN For Temp greater than 38 C (100.4 F) or headache  benzocaine 15 mG/menthol 3.6 mG Lozenge 1 Lozenge Oral every 3 hours PRN Sore Throat  diphenhydrAMINE   Capsule 25 milliGRAM(s) Oral every 6 hours PRN Rash and/or Itching  ondansetron Injectable 4 milliGRAM(s) IV Push every 6 hours PRN Nausea and/or Vomiting  oxyCODONE    IR 5 milliGRAM(s) Oral every 4 hours PRN Moderate Pain (4 - 6)  zaleplon 5 milliGRAM(s) Oral at bedtime PRN Insomnia    ASSESSMENT AND PLAN:  #S/p mechanical fall with LT hip fracture s/p IMN/ anemia acute blood loss likely 2 to fracture  Ortho f/u appreciated  PT as tolerated  Monitor HH  Pain meds prn  Bowel regimen  Incentive spirometry  AC by Lovenox    #Pneumonia likely HCAP/pseudomonas/ Acute resp failure/hypoxia likely 2 to PNA  Imaging studies reviewed  On BiPAP intermittently  Pulm eval appreciated, d/w DR Berger  ID eval appreciated- cont IV abx as per ID  Swallow eval appreciated- on regular diet  Switched from IV steroids to PO prednisone, leukocytosis likely from steroids    #COPD acute exacerbation/chr resp failure on home O2  Pulm f/u appreciated  switched to PO prednisone   Nebs    #Hypothyroidism/hyperlipidemia  Stable    #Dispo- treat pneumonia. Not ready for discharge yet. Possible [facility] tomorrow if clinically stable

## 2018-03-29 NOTE — PROGRESS NOTE ADULT - PROBLEM SELECTOR PROBLEM 7
Closed fracture of left hip, initial encounter

## 2018-03-30 VITALS — OXYGEN SATURATION: 96 %

## 2018-03-30 LAB
ANION GAP SERPL CALC-SCNC: 6 MMOL/L — SIGNIFICANT CHANGE UP (ref 5–17)
BUN SERPL-MCNC: 16 MG/DL — SIGNIFICANT CHANGE UP (ref 7–23)
CALCIUM SERPL-MCNC: 8.5 MG/DL — SIGNIFICANT CHANGE UP (ref 8.5–10.1)
CHLORIDE SERPL-SCNC: 99 MMOL/L — SIGNIFICANT CHANGE UP (ref 96–108)
CO2 SERPL-SCNC: 33 MMOL/L — HIGH (ref 22–31)
CREAT SERPL-MCNC: 0.41 MG/DL — LOW (ref 0.5–1.3)
GLUCOSE SERPL-MCNC: 175 MG/DL — HIGH (ref 70–99)
HCT VFR BLD CALC: 28.4 % — LOW (ref 34.5–45)
HGB BLD-MCNC: 9.4 G/DL — LOW (ref 11.5–15.5)
MCHC RBC-ENTMCNC: 28.1 PG — SIGNIFICANT CHANGE UP (ref 27–34)
MCHC RBC-ENTMCNC: 33.1 GM/DL — SIGNIFICANT CHANGE UP (ref 32–36)
MCV RBC AUTO: 85 FL — SIGNIFICANT CHANGE UP (ref 80–100)
NRBC # BLD: 0 /100 WBCS — SIGNIFICANT CHANGE UP (ref 0–0)
PLATELET # BLD AUTO: 335 K/UL — SIGNIFICANT CHANGE UP (ref 150–400)
POTASSIUM SERPL-MCNC: 3.9 MMOL/L — SIGNIFICANT CHANGE UP (ref 3.5–5.3)
POTASSIUM SERPL-SCNC: 3.9 MMOL/L — SIGNIFICANT CHANGE UP (ref 3.5–5.3)
RBC # BLD: 3.34 M/UL — LOW (ref 3.8–5.2)
RBC # FLD: 14.3 % — SIGNIFICANT CHANGE UP (ref 10.3–14.5)
SODIUM SERPL-SCNC: 138 MMOL/L — SIGNIFICANT CHANGE UP (ref 135–145)
WBC # BLD: 12.85 K/UL — HIGH (ref 3.8–10.5)
WBC # FLD AUTO: 12.85 K/UL — HIGH (ref 3.8–10.5)

## 2018-03-30 PROCEDURE — 99233 SBSQ HOSP IP/OBS HIGH 50: CPT

## 2018-03-30 RX ORDER — CEFUROXIME AXETIL 250 MG
1 TABLET ORAL
Qty: 0 | Refills: 0 | COMMUNITY
Start: 2018-03-30

## 2018-03-30 RX ORDER — CEFUROXIME AXETIL 250 MG
250 TABLET ORAL EVERY 12 HOURS
Qty: 0 | Refills: 0 | Status: DISCONTINUED | OUTPATIENT
Start: 2018-03-30 | End: 2018-03-30

## 2018-03-30 RX ORDER — ALBUTEROL 90 UG/1
1 AEROSOL, METERED ORAL
Qty: 0 | Refills: 0 | COMMUNITY
Start: 2018-03-30

## 2018-03-30 RX ADMIN — Medication 325 MILLIGRAM(S): at 08:54

## 2018-03-30 RX ADMIN — Medication 325 MILLIGRAM(S): at 13:24

## 2018-03-30 RX ADMIN — Medication 100 MICROGRAM(S): at 04:52

## 2018-03-30 RX ADMIN — Medication 500 MICROGRAM(S): at 14:15

## 2018-03-30 RX ADMIN — Medication 650 MILLIGRAM(S): at 01:46

## 2018-03-30 RX ADMIN — Medication 500 MICROGRAM(S): at 03:21

## 2018-03-30 RX ADMIN — ALBUTEROL 1 PUFF(S): 90 AEROSOL, METERED ORAL at 08:11

## 2018-03-30 RX ADMIN — ALBUTEROL 1 PUFF(S): 90 AEROSOL, METERED ORAL at 11:00

## 2018-03-30 RX ADMIN — Medication 40 MILLIGRAM(S): at 04:53

## 2018-03-30 RX ADMIN — Medication 81 MILLIGRAM(S): at 13:24

## 2018-03-30 RX ADMIN — ALBUTEROL 1 PUFF(S): 90 AEROSOL, METERED ORAL at 15:15

## 2018-03-30 RX ADMIN — Medication 1 TABLET(S): at 13:23

## 2018-03-30 RX ADMIN — ENOXAPARIN SODIUM 40 MILLIGRAM(S): 100 INJECTION SUBCUTANEOUS at 13:25

## 2018-03-30 RX ADMIN — Medication 1 MILLIGRAM(S): at 13:24

## 2018-03-30 RX ADMIN — BUDESONIDE AND FORMOTEROL FUMARATE DIHYDRATE 2 PUFF(S): 160; 4.5 AEROSOL RESPIRATORY (INHALATION) at 08:12

## 2018-03-30 RX ADMIN — Medication 600 MILLIGRAM(S): at 04:52

## 2018-03-30 RX ADMIN — Medication 500 MICROGRAM(S): at 08:12

## 2018-03-30 RX ADMIN — ALBUTEROL 1 PUFF(S): 90 AEROSOL, METERED ORAL at 03:20

## 2018-03-30 NOTE — PROGRESS NOTE ADULT - PROVIDER SPECIALTY LIST ADULT
Anticoag Management
Hospitalist
Infectious Disease
Infectious Disease
Orthopedics
Pulmonology
Infectious Disease
Anticoag Management

## 2018-03-30 NOTE — PROGRESS NOTE ADULT - ASSESSMENT
86 y/o Female with h/o COPD, hypothyroidism, hyperlipidemia was admitted on 3/23 for increased weakness s/p mechanical fall. She normally walks with the walker, she did not take one with her when she went to the bathroom and she lost her balance and fell, sustaining LT hip fracture. She denies LOC. She underwent left hip ORIF without complications on same day. On 3/26 she was noted with SOB and cough; she was started on vancomycin IV and cefepime.    1. B/l lower lobes pneumonia. Possible aspiration pneumonia with rare PSAE in respiratory culture. COPD. Left hip fracture s/p ORIF.  -respiratory function is frail  -f/u BC x 2  -CT chest reviewed  -on cefepime 1 gm IV q12h # 4  -tolerating abx well so far; no side effects noted  -change abx to ceftin 250 mg PO q12h for 10 more days  -aspiration precautions  -local wound care  -monitor temps  -f/u CBC  -supportive care  2. Other issues:   -care per medicine

## 2018-03-30 NOTE — PROGRESS NOTE ADULT - SUBJECTIVE AND OBJECTIVE BOX
Patient is a 85y old  Female who presents with a chief complaint of s/p mechanical fall (23 Mar 2018 19:59)    Date of service: 03-30-18 @ 10:13    OOB to chair  No SOB at rest;   Weak looking    ROS: no fever or chills; denies dizziness, no HA, no SOB or cough, no abdominal pain, no diarrhea or constipation; no dysuria, no legs pain, no rashes    MEDICATIONS  (STANDING):  ALBUTerol    90 MICROgram(s) HFA Inhaler 1 Puff(s) Inhalation every 4 hours  ascorbic acid 500 milliGRAM(s) Oral two times a day  aspirin  chewable 81 milliGRAM(s) Oral daily  atorvastatin 40 milliGRAM(s) Oral at bedtime  buDESOnide   0.25 milliGRAM(s) Respule 0.25 milliGRAM(s) Inhalation every 12 hours  buDESOnide 160 MICROgram(s)/formoterol 4.5 MICROgram(s) Inhaler 2 Puff(s) Inhalation two times a day  cefepime  IVPB      cefepime  IVPB 1000 milliGRAM(s) IV Intermittent every 12 hours  docusate sodium 100 milliGRAM(s) Oral three times a day  enoxaparin Injectable 40 milliGRAM(s) SubCutaneous daily  ferrous    sulfate 325 milliGRAM(s) Oral three times a day with meals  folic acid 1 milliGRAM(s) Oral daily  guaiFENesin  milliGRAM(s) Oral every 12 hours  ipratropium    for Nebulization 500 MICROGram(s) Nebulizer every 6 hours  levothyroxine 100 MICROGram(s) Oral daily  mirtazapine 30 milliGRAM(s) Oral at bedtime  multivitamin 1 Tablet(s) Oral daily  OLANZapine 5 milliGRAM(s) Oral at bedtime  predniSONE   Tablet 40 milliGRAM(s) Oral daily  tiotropium 18 MICROgram(s) Capsule 1 Capsule(s) Inhalation daily    MEDICATIONS  (PRN):  acetaminophen   Tablet 650 milliGRAM(s) Oral every 6 hours PRN For Temp greater than 38 C (100.4 F) or headache  benzocaine 15 mG/menthol 3.6 mG Lozenge 1 Lozenge Oral every 3 hours PRN Sore Throat  diphenhydrAMINE   Capsule 25 milliGRAM(s) Oral every 6 hours PRN Rash and/or Itching  ondansetron Injectable 4 milliGRAM(s) IV Push every 6 hours PRN Nausea and/or Vomiting  oxyCODONE    IR 5 milliGRAM(s) Oral every 4 hours PRN Moderate Pain (4 - 6)  zaleplon 5 milliGRAM(s) Oral at bedtime PRN Insomnia      Vital Signs Last 24 Hrs  T(C): 36.4 (29 Mar 2018 23:57), Max: 37 (29 Mar 2018 17:13)  T(F): 97.6 (29 Mar 2018 23:57), Max: 98.6 (29 Mar 2018 17:13)  HR: 81 (30 Mar 2018 08:17) (80 - 89)  BP: 139/58 (29 Mar 2018 23:57) (119/61 - 139/58)  BP(mean): --  RR: 18 (29 Mar 2018 23:57) (18 - 20)  SpO2: 94% (30 Mar 2018 08:17) (94% - 97%)    Physical Exam:      Constitutional: frail looking  HEENT: NC/AT, EOMI, PERRLA  Neck: supple  Back: no tenderness  Respiratory:  few rhonchi  Cardiovascular: S1S2 regular, no murmurs  Abdomen: soft, not tender, not distended, positive BS  Genitourinary: no LN palpable  Rectal: deferred  Musculoskeletal: no muscle tenderness, no joint swelling or tenderness  Extremities: no pedal edema; left hip wound clean  Neurological: AxOx3, moving all extremities  Skin: no rashes    Labs:                        9.7    14.37 )-----------( 353      ( 29 Mar 2018 09:09 )             30.1     03-29    138  |  99  |  15  ----------------------------<  138<H>  4.0   |  32<H>  |  0.40<L>    Ca    8.7      29 Mar 2018 09:09         Vancomycin Level, Trough: 14.2 ug/mL (03-28 @ 21:57)  Vancomycin Level, Trough: 16.5 ug/mL (03-28 @ 04:56)                                   8.9    11.95 )-----------( 269      ( 27 Mar 2018 07:52 )             28.2     03-27    138  |  96  |  21  ----------------------------<  125<H>  4.1   |  36<H>  |  0.50    Ca    8.3<L>      27 Mar 2018 05:35    Culture - Sputum . (03.26.18 @ 16:00)    Gram Stain:   Numerous polymorphonuclear leukocytes per low power field  Few Squamous epithelial cells per low power field  Few Gram Positive Cocci in Clusters seen per oil power field  Few Gram Variable Rods seen per oil power field    Specimen Source: .Sputum Sputum    Culture Results:   **Please Note**: This is a Corrected Report**  No Pseudomonas aeruginosa isolated  Normal Respiratory Brenda present  Previously reported as:  Rare Pseudomonas aeruginosa  Normal Respiratory Brenda present      Culture - Urine (collected 22 Mar 2018 13:48)  Source: .Urine None  Final Report (23 Mar 2018 22:17):    No growth          Radiology:    < from: CT Angio Chest PE Protocol w/ IV Cont (03.25.18 @ 20:16) >  No pulmonary embolism.    Bilateral lower lobe pneumonia, right side greater than left.  Severe emphysema.    < end of copied text >      Advanced directives addressed: full resuscitation

## 2018-03-30 NOTE — PROGRESS NOTE ADULT - ASSESSMENT
This is an 85 year old female s/p IM nail with high risk for VTE due to current pulmonary function, surgery, fracture, and immobility. Moderate bleeding risk due to age.        Plan:  prob rehab today    Cont  Lovenox 40 mg SQ daily x 4 weeks  cotn ASA 81 mg po daily  ::Daily CBC/BMP  ::Venodynes b/l  ::Enc ambulation

## 2018-03-30 NOTE — PROGRESS NOTE ADULT - SUBJECTIVE AND OBJECTIVE BOX
c/c: mechanical fall    HPI:  84yo/F with PMH COPD, hypothyroidism, hyperlipidemia, psychiatric history on zyprexa, dementia, recently came home from rehab,  who presented s/p mechanical fall.  She normally walks with the walker, she did not take one with her when she went to the bathroom and she lost her balance and fell, sustaining LT hip fracture. She was admitted for further management. Underwent IM nail 3/23    3/25: Pt seen and examined this am. Felt uncomfortable, tired. No f/c/r. Tolerating po. No acute overnight events.   3/26/18 c/o SOB, cough  3/27/18- s/p RR last night for hypoxia, now off ventimask and on NC doing good  3/28/18 doing much better today, up in a chair.  3/29/18 no acute events  3/30/18 feels better    Review of system- All 10 systems reviewed and is as per HPI otherwise negative.     Vital Signs Last 24 Hrs  T(C): 36.4 (29 Mar 2018 23:57), Max: 37 (29 Mar 2018 17:13)  T(F): 97.6 (29 Mar 2018 23:57), Max: 98.6 (29 Mar 2018 17:13)  HR: 81 (30 Mar 2018 08:17) (80 - 89)  BP: 139/58 (29 Mar 2018 23:57) (119/61 - 139/58)  BP(mean): --  RR: 18 (29 Mar 2018 23:57) (18 - 20)  SpO2: 94% (30 Mar 2018 08:17) (94% - 97%)    PHYSICAL EXAM:  GENERAL: Comfortable, no acute distress  HEAD:  Atraumatic, Normocephalic  EYES: EOMI, PERRLA  HEENT: moist mucous membranes  NECK: Supple, No JVD  NERVOUS SYSTEM:  non focal, confused  CHEST/LUNG: rhonchi b/l better  HEART: S1,S2+, Regular rate and rhythm;   ABDOMEN: Soft, Nontender, Nondistended; Bowel sounds present  GENITOURINARY- Voiding, no palpable bladder  EXTREMITIES:  No clubbing, cyanosis, or edema  MUSCULOSKELETAL- left hip dressing intact  SKIN-no rash    LABS:                        9.4    12.85 )-----------( 335      ( 30 Mar 2018 09:44 )             28.4     138    |  99     |  16     ----------------------------<  175    3.9     |  33     |  0.41     Ca    8.5        30 Mar 2018 09:44    Radiology:  EXAM:  CT ANGIO CHEST PE PROTOCOL IC                        PROCEDURE DATE:  03/25/2018    INTERPRETATION:  Clinical information: Dyspnea    TECHNIQUE:  Axial computed tomographic angiography images of the chest with   intravenous   contrast using pulmonary embolism protocol.     MIP reconstructed images were created and reviewed.  Coronal and sagittal reformatted images were created and reviewed.    CONTRAST:  90 mL of Omnipaque 350 administered intravenously with 10 cc discarded.    COMPARISON:  CT CHEST 2018-02-22 08:57    FINDINGS:  PULMONARY ARTERIES: Adequately opacified and no evidence of pulmonary   embolism.  AORTA: Moderate atherosclerotic arterial calcifications, including   coronary artery calcification. Normal caliberaorta.   LUNGS: Severe emphysema. Moderate sized consolidation at  the right lung base and mild patchy left lower lobe airspace disease.   Mild chronic lingular atelectasis.  PLEURAL SPACE: No pleural effusion or pneumothorax.  HEART: Heart is normalin size. No pericardial effusion.  BONES/JOINTS:  No acute fracture.  No dislocation. Moderate wedge   compression deformity at T7.  SOFT TISSUES:  Unremarkable.  LYMPH NODES:  Unremarkable.  No enlarged lymph nodes.  UPPER ABDOMEN: 1 cm nonobstructing right renal stone.    IMPRESSION:       No pulmonary embolism.  Bilateral lower lobe pneumonia, right side greater than left.  Severe emphysema.    EXAM:  XR CHEST AP OR PA 1V                        PROCEDURE DATE:  03/26/2018    INTERPRETATION:  Follow-up.    AP chest. Prior 3/25/2018.  Rotated to right. No change heart mediastinum. No significant interval   change in the airspace infiltrate in the right mid to lower lung field.   Generalized chronic accentuation bronchovascular markings similar to   prior. No pleural effusion, pneumothorax or other new abnormality.    Impression: No significant interval change.    MEDICATIONS  (STANDING):  ALBUTerol    90 MICROgram(s) HFA Inhaler 1 Puff(s) Inhalation every 4 hours  ascorbic acid 500 milliGRAM(s) Oral two times a day  aspirin  chewable 81 milliGRAM(s) Oral daily  atorvastatin 40 milliGRAM(s) Oral at bedtime  buDESOnide   0.25 milliGRAM(s) Respule 0.25 milliGRAM(s) Inhalation every 12 hours  buDESOnide 160 MICROgram(s)/formoterol 4.5 MICROgram(s) Inhaler 2 Puff(s) Inhalation two times a day  cefepime  IVPB      cefepime  IVPB 1000 milliGRAM(s) IV Intermittent every 12 hours  docusate sodium 100 milliGRAM(s) Oral three times a day  enoxaparin Injectable 40 milliGRAM(s) SubCutaneous daily  ferrous    sulfate 325 milliGRAM(s) Oral three times a day with meals  folic acid 1 milliGRAM(s) Oral daily  guaiFENesin  milliGRAM(s) Oral every 12 hours  ipratropium    for Nebulization 500 MICROGram(s) Nebulizer every 6 hours  levothyroxine 100 MICROGram(s) Oral daily  mirtazapine 30 milliGRAM(s) Oral at bedtime  multivitamin 1 Tablet(s) Oral daily  OLANZapine 5 milliGRAM(s) Oral at bedtime  predniSONE   Tablet 40 milliGRAM(s) Oral daily  tiotropium 18 MICROgram(s) Capsule 1 Capsule(s) Inhalation daily    MEDICATIONS  (PRN):  acetaminophen   Tablet 650 milliGRAM(s) Oral every 6 hours PRN For Temp greater than 38 C (100.4 F) or headache  benzocaine 15 mG/menthol 3.6 mG Lozenge 1 Lozenge Oral every 3 hours PRN Sore Throat  diphenhydrAMINE   Capsule 25 milliGRAM(s) Oral every 6 hours PRN Rash and/or Itching  ondansetron Injectable 4 milliGRAM(s) IV Push every 6 hours PRN Nausea and/or Vomiting  oxyCODONE    IR 5 milliGRAM(s) Oral every 4 hours PRN Moderate Pain (4 - 6)  zaleplon 5 milliGRAM(s) Oral at bedtime PRN Insomnia    ASSESSMENT AND PLAN:  #S/p mechanical fall with LT hip fracture s/p IMN/ anemia acute blood loss likely 2 to fracture  Ortho f/u appreciated  PT as tolerated  Monitor HH  Pain meds prn  Bowel regimen  Incentive spirometry  AC by Lovenox 4 weeks total    #Pneumonia likely HCAP/pseudomonas/ Acute resp failure/hypoxia likely 2 to PNA  Imaging studies reviewed  On BiPAP intermittently  Pulm eval appreciated, d/w DR Berger  D/w DR Gonzálesp- OK to switch to PO Ceftin 250mg BID for 7 more days  Swallow eval appreciated- on regular diet  Taper PO prednisone    #COPD acute exacerbation/chr resp failure on home O2  Pulm f/u appreciated  switched to PO prednisone   Nebs    #Hypothyroidism/hyperlipidemia  Stable    #Dispo- [facility] today

## 2018-03-30 NOTE — PROGRESS NOTE ADULT - SUBJECTIVE AND OBJECTIVE BOX
HPI:  86yo/F with PMH COPD, hypothyroidism, hyperlipidemia presented s/p mechanical fall earlier today. She normally walks with the walker, she did not take one with her when she went to the bathroom and she lost her balance and fell, sustaining LT hip fracture. She denies LOC. (22 Mar 2018 15:59)      Patient is a 85y old  Female who presents with a chief complaint of s/p mechanical fall (23 Mar 2018 19:59) is now s/p IM nail 3.23 with Dr. Mireles.      Consulted by Dr. Mireles for VTE prophylaxis, risk stratification, and anticoagulation management.    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    Allergies    No Known Allergies    Intolerances    REVIEW OF SYSTEMS    (  )Fever	     (  )Constipation	(  )SOB				(  )Headache	(  )Dysuria  (  )Chills	     (  )Melena	(  )Dyspnea present on exertion	                    (  )Dizziness                    (  )Polyuria  (  )Nausea	     (  )Hematochezia	(  )Cough			                    (  )Syncope   	(  )Hematuria  (  )Vomiting    (  )Chest Pain	(  )Wheezing			(  )Weakness  (  )Diarrhea     (  )Palpitations	(  )Anorexia			(  )Myalgia    All other review of systems negative: Yes    PHYSICAL EXAM:    Constitutional: Appears Well    Neurological: A& O x 2    Skin: Warm    Respiratory and Thorax: normal effort; Breath sounds: decreased with rhonchi b/l + nonprod cough  	  Cardiovascular: S1, S2, regular, NMBR	    Gastrointestinal: BS + x 4Q, nontender	    Genitourinary:  Bladder nondistended, nontender    Musculoskeletal:   General Right:   no muscle/joint tenderness,   normal tone, no joint swelling,   ROM: full	    General Left:   no muscle/joint tenderness,   normal tone, no joint swelling,   ROM: limited    Hip:  Left: Dressing CDI; aquacels x 3    Lower extrems:   Right: no calf tenderness              negative yasmany's sign               + pedal pulses    Left:   no calf tenderness              negative yasmany's sign               + pedal pulses                          9.4    12.85 )-----------( 335      ( 30 Mar 2018 09:44 )             28.4       03-30    138  |  99  |  16  ----------------------------<  175<H>  3.9   |  33<H>  |  0.41<L>    Ca    8.5      30 Mar 2018 09:44                              9.7    14.37 )-----------( 353      ( 29 Mar 2018 09:09 )             30.1       03-29    138  |  99  |  15  ----------------------------<  138<H>  4.0   |  32<H>  |  0.40<L>    Ca    8.7      29 Mar 2018 09:09                                9.9    12.19 )-----------( 362      ( 28 Mar 2018 04:56 )             31.4       03-28    134<L>  |  96  |  20  ----------------------------<  136<H>  4.2   |  30  |  0.59    Ca    8.8      28 Mar 2018 04:56                                9.6    12.88 )-----------( 255      ( 24 Mar 2018 05:42 )             30.9       03-24    140  |  103  |  25<H>  ----------------------------<  103<H>  4.5   |  30  |  0.45<L>    Ca    8.6      24 Mar 2018 05:42    TPro  7.4  /  Alb  2.3<L>  /  TBili  0.5  /  DBili  x   /  AST  18  /  ALT  21  /  AlkPhos  137<H>  03-22      PT/INR - ( 23 Mar 2018 04:33 )   PT: 13.8 sec;   INR: 1.27 ratio         PTT - ( 23 Mar 2018 04:33 )  PTT:28.1 sec				    MEDICATIONS  (STANDING):  ALBUTerol    0.083% 2.5 milliGRAM(s) Nebulizer every 6 hours  ALBUTerol    90 MICROgram(s) HFA Inhaler 1 Puff(s) Inhalation every 4 hours  ascorbic acid 500 milliGRAM(s) Oral two times a day  atorvastatin 40 milliGRAM(s) Oral at bedtime  buDESOnide   0.25 milliGRAM(s) Respule 0.25 milliGRAM(s) Inhalation every 12 hours  docusate sodium 100 milliGRAM(s) Oral three times a day  enoxaparin Injectable 40 milliGRAM(s) SubCutaneous daily  ferrous    sulfate 325 milliGRAM(s) Oral three times a day with meals  folic acid 1 milliGRAM(s) Oral daily  guaiFENesin  milliGRAM(s) Oral every 12 hours  lactated ringers. 1000 milliLiter(s) IV Continuous <Continuous>  levothyroxine 100 MICROGram(s) Oral daily  mirtazapine 30 milliGRAM(s) Oral at bedtime  multivitamin 1 Tablet(s) Oral daily  OLANZapine 5 milliGRAM(s) Oral at bedtime  sodium chloride 0.9%. 1000 milliLiter(s) IV Continuous <Continuous>    Vital Signs Last 24 Hrs  T(C): 36.6 (03-30-18 @ 11:12), Max: 37 (03-29-18 @ 17:13)  T(F): 97.8 (03-30-18 @ 11:12), Max: 98.6 (03-29-18 @ 17:13)  HR: 85 (03-30-18 @ 11:12) (80 - 88)  BP: 124/68 (03-30-18 @ 11:12) (119/61 - 139/58)  BP(mean): 81 (03-30-18 @ 11:12) (81 - 81)  RR: 17 (03-30-18 @ 11:12) (17 - 18)  SpO2: 96% (03-30-18 @ 11:12) (94% - 97%)    DVT Prophylaxis:  LMWH                   (x  )  Heparin SQ           (  )  Coumadin             (  )  Xarelto                  (  )  Eliquis                   (  )  Venodynes           (x  )  Ambulation          ( x )  UFH                       (  )  Contraindicated  (  )

## 2018-04-03 DIAGNOSIS — J44.1 CHRONIC OBSTRUCTIVE PULMONARY DISEASE WITH (ACUTE) EXACERBATION: ICD-10-CM

## 2018-04-03 DIAGNOSIS — Z79.82 LONG TERM (CURRENT) USE OF ASPIRIN: ICD-10-CM

## 2018-04-03 DIAGNOSIS — Z87.891 PERSONAL HISTORY OF NICOTINE DEPENDENCE: ICD-10-CM

## 2018-04-03 DIAGNOSIS — J15.1 PNEUMONIA DUE TO PSEUDOMONAS: ICD-10-CM

## 2018-04-03 DIAGNOSIS — S32.049A UNSPECIFIED FRACTURE OF FOURTH LUMBAR VERTEBRA, INITIAL ENCOUNTER FOR CLOSED FRACTURE: ICD-10-CM

## 2018-04-03 DIAGNOSIS — M19.021 PRIMARY OSTEOARTHRITIS, RIGHT ELBOW: ICD-10-CM

## 2018-04-03 DIAGNOSIS — Z99.81 DEPENDENCE ON SUPPLEMENTAL OXYGEN: ICD-10-CM
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
## 2018-04-03 DIAGNOSIS — J44.0 CHRONIC OBSTRUCTIVE PULMONARY DISEASE WITH (ACUTE) LOWER RESPIRATORY INFECTION: ICD-10-CM

## 2018-04-03 DIAGNOSIS — J69.0 PNEUMONITIS DUE TO INHALATION OF FOOD AND VOMIT: ICD-10-CM

## 2018-04-03 DIAGNOSIS — E78.5 HYPERLIPIDEMIA, UNSPECIFIED: ICD-10-CM

## 2018-04-03 DIAGNOSIS — Q72.811 CONGENITAL SHORTENING OF RIGHT LOWER LIMB: ICD-10-CM

## 2018-04-03 DIAGNOSIS — J96.21 ACUTE AND CHRONIC RESPIRATORY FAILURE WITH HYPOXIA: ICD-10-CM

## 2018-04-03 DIAGNOSIS — M19.022 PRIMARY OSTEOARTHRITIS, LEFT ELBOW: ICD-10-CM

## 2018-04-03 DIAGNOSIS — S72.142A DISPLACED INTERTROCHANTERIC FRACTURE OF LEFT FEMUR, INITIAL ENCOUNTER FOR CLOSED FRACTURE: ICD-10-CM

## 2018-04-03 DIAGNOSIS — D62 ACUTE POSTHEMORRHAGIC ANEMIA: ICD-10-CM

## 2018-04-03 DIAGNOSIS — E03.9 HYPOTHYROIDISM, UNSPECIFIED: ICD-10-CM

## 2018-04-03 DIAGNOSIS — F32.9 MAJOR DEPRESSIVE DISORDER, SINGLE EPISODE, UNSPECIFIED: ICD-10-CM

## 2018-04-03 DIAGNOSIS — D72.829 ELEVATED WHITE BLOOD CELL COUNT, UNSPECIFIED: ICD-10-CM
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
## 2018-04-03 DIAGNOSIS — F03.90 UNSPECIFIED DEMENTIA WITHOUT BEHAVIORAL DISTURBANCE: ICD-10-CM

## 2018-04-20 NOTE — H&P ADULT - NEGATIVE MUSCULOSKELETAL SYMPTOMS
Immediate Operative Summary


Operative Date


Apr 20, 2018.





Pre-Operative Diagnosis





Pilonidal Cyst





Post-Operative Diagnosis





Same





Procedure(s) Performed





Pilonidal Cystectomy





Surgeon


Dr. Amato





Assistant Surgeon(s)


MARY Velez PA-C





Estimated Blood Loss


4 ml





Findings


Consistent with Post-Op Diagnosis


Pilonidal cyst excised, closed in layers





Specimens





A.) Pilonidal Cyst





Drains


None





Anesthesia Type


General





Complication(s)


none





Disposition


Accompanied Pt To Recovery:  no


Disposition:  Recovery Room / PACU no joint swelling

## 2018-05-03 ENCOUNTER — INPATIENT (INPATIENT)
Facility: HOSPITAL | Age: 83
LOS: 5 days | Discharge: NURSING, REHAB OR LONG-TERM CARE | End: 2018-05-09
Attending: INTERNAL MEDICINE | Admitting: INTERNAL MEDICINE
Payer: MEDICARE

## 2018-05-03 VITALS
DIASTOLIC BLOOD PRESSURE: 68 MMHG | WEIGHT: 115.08 LBS | TEMPERATURE: 97 F | HEIGHT: 66 IN | OXYGEN SATURATION: 98 % | HEART RATE: 95 BPM | SYSTOLIC BLOOD PRESSURE: 104 MMHG | RESPIRATION RATE: 18 BRPM

## 2018-05-03 PROBLEM — J44.9 CHRONIC OBSTRUCTIVE PULMONARY DISEASE, UNSPECIFIED: Chronic | Status: ACTIVE | Noted: 2018-03-22

## 2018-05-03 LAB
ALBUMIN SERPL ELPH-MCNC: 1.5 G/DL — LOW (ref 3.3–5)
ALP SERPL-CCNC: 175 U/L — HIGH (ref 40–120)
ALT FLD-CCNC: 9 U/L — LOW (ref 12–78)
ANION GAP SERPL CALC-SCNC: 10 MMOL/L — SIGNIFICANT CHANGE UP (ref 5–17)
ANISOCYTOSIS BLD QL: SIGNIFICANT CHANGE UP
AST SERPL-CCNC: 12 U/L — LOW (ref 15–37)
BASE EXCESS BLDA CALC-SCNC: 0.3 MMOL/L — SIGNIFICANT CHANGE UP (ref -2–2)
BASOPHILS # BLD AUTO: 0 K/UL — SIGNIFICANT CHANGE UP (ref 0–0.2)
BASOPHILS NFR BLD AUTO: 0 % — SIGNIFICANT CHANGE UP (ref 0–2)
BILIRUB SERPL-MCNC: 0.3 MG/DL — SIGNIFICANT CHANGE UP (ref 0.2–1.2)
BIZARRE PLATELETS BLD QL SMEAR: PRESENT — SIGNIFICANT CHANGE UP
BLOOD GAS COMMENTS ARTERIAL: SIGNIFICANT CHANGE UP
BLOOD GAS COMMENTS ARTERIAL: SIGNIFICANT CHANGE UP
BUN SERPL-MCNC: 45 MG/DL — HIGH (ref 7–23)
BURR CELLS BLD QL SMEAR: PRESENT — SIGNIFICANT CHANGE UP
CALCIUM SERPL-MCNC: 7.6 MG/DL — LOW (ref 8.5–10.1)
CHLORIDE SERPL-SCNC: 99 MMOL/L — SIGNIFICANT CHANGE UP (ref 96–108)
CO2 SERPL-SCNC: 26 MMOL/L — SIGNIFICANT CHANGE UP (ref 22–31)
CREAT SERPL-MCNC: 1.39 MG/DL — HIGH (ref 0.5–1.3)
DACRYOCYTES BLD QL SMEAR: SLIGHT — SIGNIFICANT CHANGE UP
EOSINOPHIL # BLD AUTO: 0 K/UL — SIGNIFICANT CHANGE UP (ref 0–0.5)
EOSINOPHIL NFR BLD AUTO: 0 % — SIGNIFICANT CHANGE UP (ref 0–6)
GAS PNL BLDA: SIGNIFICANT CHANGE UP
GLUCOSE SERPL-MCNC: 131 MG/DL — HIGH (ref 70–99)
HCO3 BLDA-SCNC: 23 MMOL/L — SIGNIFICANT CHANGE UP (ref 21–29)
HCT VFR BLD CALC: 37.9 % — SIGNIFICANT CHANGE UP (ref 34.5–45)
HGB BLD-MCNC: 12.4 G/DL — SIGNIFICANT CHANGE UP (ref 11.5–15.5)
HOROWITZ INDEX BLDA+IHG-RTO: 28 — SIGNIFICANT CHANGE UP
LACTATE SERPL-SCNC: 1.9 MMOL/L — SIGNIFICANT CHANGE UP (ref 0.7–2)
LG PLATELETS BLD QL AUTO: SLIGHT — SIGNIFICANT CHANGE UP
LIDOCAIN IGE QN: 24 U/L — LOW (ref 73–393)
LYMPHOCYTES # BLD AUTO: 0.34 K/UL — LOW (ref 1–3.3)
LYMPHOCYTES # BLD AUTO: 1 % — LOW (ref 13–44)
MACROCYTES BLD QL: SLIGHT — SIGNIFICANT CHANGE UP
MANUAL SMEAR VERIFICATION: SIGNIFICANT CHANGE UP
MCHC RBC-ENTMCNC: 28 PG — SIGNIFICANT CHANGE UP (ref 27–34)
MCHC RBC-ENTMCNC: 32.7 GM/DL — SIGNIFICANT CHANGE UP (ref 32–36)
MCV RBC AUTO: 85.6 FL — SIGNIFICANT CHANGE UP (ref 80–100)
MICROCYTES BLD QL: SLIGHT — SIGNIFICANT CHANGE UP
MONOCYTES # BLD AUTO: 3.1 K/UL — HIGH (ref 0–0.9)
MONOCYTES NFR BLD AUTO: 9 % — SIGNIFICANT CHANGE UP (ref 2–14)
NEUTROPHILS # BLD AUTO: 30.96 K/UL — HIGH (ref 1.8–7.4)
NEUTROPHILS NFR BLD AUTO: 84 % — HIGH (ref 43–77)
NEUTS BAND # BLD: 6 % — SIGNIFICANT CHANGE UP (ref 0–8)
NEUTS VAC BLD QL SMEAR: SLIGHT — SIGNIFICANT CHANGE UP
NRBC # BLD: 0 /100 — SIGNIFICANT CHANGE UP (ref 0–0)
NRBC # BLD: SIGNIFICANT CHANGE UP /100 WBCS (ref 0–0)
PCO2 BLDA: 31 MMHG — LOW (ref 32–46)
PH BLDA: 7.48 — HIGH (ref 7.35–7.45)
PLAT MORPH BLD: NORMAL — SIGNIFICANT CHANGE UP
PLATELET # BLD AUTO: 375 K/UL — SIGNIFICANT CHANGE UP (ref 150–400)
PO2 BLDA: 96 MMHG — SIGNIFICANT CHANGE UP (ref 74–108)
POIKILOCYTOSIS BLD QL AUTO: SIGNIFICANT CHANGE UP
POLYCHROMASIA BLD QL SMEAR: SLIGHT — SIGNIFICANT CHANGE UP
POTASSIUM SERPL-MCNC: 4.7 MMOL/L — SIGNIFICANT CHANGE UP (ref 3.5–5.3)
POTASSIUM SERPL-SCNC: 4.7 MMOL/L — SIGNIFICANT CHANGE UP (ref 3.5–5.3)
PROT SERPL-MCNC: 5.3 GM/DL — LOW (ref 6–8.3)
RBC # BLD: 4.43 M/UL — SIGNIFICANT CHANGE UP (ref 3.8–5.2)
RBC # FLD: 16.4 % — HIGH (ref 10.3–14.5)
RBC BLD AUTO: (no result)
SAO2 % BLDA: 97 % — HIGH (ref 92–96)
SCHISTOCYTES BLD QL AUTO: SLIGHT — SIGNIFICANT CHANGE UP
SODIUM SERPL-SCNC: 135 MMOL/L — SIGNIFICANT CHANGE UP (ref 135–145)
TOXIC GRANULES BLD QL SMEAR: PRESENT — SIGNIFICANT CHANGE UP
WBC # BLD: 34.4 K/UL — HIGH (ref 3.8–10.5)
WBC # FLD AUTO: 34.4 K/UL — HIGH (ref 3.8–10.5)

## 2018-05-03 PROCEDURE — 71045 X-RAY EXAM CHEST 1 VIEW: CPT | Mod: 26

## 2018-05-03 PROCEDURE — 99285 EMERGENCY DEPT VISIT HI MDM: CPT

## 2018-05-03 PROCEDURE — 93010 ELECTROCARDIOGRAM REPORT: CPT

## 2018-05-03 RX ORDER — OLANZAPINE 15 MG/1
5 TABLET, FILM COATED ORAL DAILY
Qty: 0 | Refills: 0 | Status: DISCONTINUED | OUTPATIENT
Start: 2018-05-03 | End: 2018-05-09

## 2018-05-03 RX ORDER — HEPARIN SODIUM 5000 [USP'U]/ML
5000 INJECTION INTRAVENOUS; SUBCUTANEOUS EVERY 8 HOURS
Qty: 0 | Refills: 0 | Status: DISCONTINUED | OUTPATIENT
Start: 2018-05-03 | End: 2018-05-09

## 2018-05-03 RX ORDER — MIRTAZAPINE 45 MG/1
15 TABLET, ORALLY DISINTEGRATING ORAL AT BEDTIME
Qty: 0 | Refills: 0 | Status: DISCONTINUED | OUTPATIENT
Start: 2018-05-03 | End: 2018-05-09

## 2018-05-03 RX ORDER — ACETAMINOPHEN 500 MG
650 TABLET ORAL EVERY 6 HOURS
Qty: 0 | Refills: 0 | Status: DISCONTINUED | OUTPATIENT
Start: 2018-05-03 | End: 2018-05-09

## 2018-05-03 RX ORDER — VANCOMYCIN HCL 1 G
VIAL (EA) INTRAVENOUS
Qty: 0 | Refills: 0 | Status: DISCONTINUED | OUTPATIENT
Start: 2018-05-03 | End: 2018-05-03

## 2018-05-03 RX ORDER — SODIUM CHLORIDE 9 MG/ML
1000 INJECTION INTRAMUSCULAR; INTRAVENOUS; SUBCUTANEOUS
Qty: 0 | Refills: 0 | Status: DISCONTINUED | OUTPATIENT
Start: 2018-05-03 | End: 2018-05-06

## 2018-05-03 RX ORDER — ATORVASTATIN CALCIUM 80 MG/1
40 TABLET, FILM COATED ORAL AT BEDTIME
Qty: 0 | Refills: 0 | Status: DISCONTINUED | OUTPATIENT
Start: 2018-05-03 | End: 2018-05-09

## 2018-05-03 RX ORDER — BUDESONIDE, MICRONIZED 100 %
0.25 POWDER (GRAM) MISCELLANEOUS EVERY 12 HOURS
Qty: 0 | Refills: 0 | Status: DISCONTINUED | OUTPATIENT
Start: 2018-05-03 | End: 2018-05-09

## 2018-05-03 RX ORDER — POTASSIUM CHLORIDE 20 MEQ
2 PACKET (EA) ORAL
Qty: 0 | Refills: 0 | COMMUNITY

## 2018-05-03 RX ORDER — SODIUM CHLORIDE 9 MG/ML
1000 INJECTION INTRAMUSCULAR; INTRAVENOUS; SUBCUTANEOUS ONCE
Qty: 0 | Refills: 0 | Status: COMPLETED | OUTPATIENT
Start: 2018-05-03 | End: 2018-05-03

## 2018-05-03 RX ORDER — LEVOTHYROXINE SODIUM 125 MCG
125 TABLET ORAL DAILY
Qty: 0 | Refills: 0 | Status: DISCONTINUED | OUTPATIENT
Start: 2018-05-03 | End: 2018-05-09

## 2018-05-03 RX ORDER — PIPERACILLIN AND TAZOBACTAM 4; .5 G/20ML; G/20ML
3.38 INJECTION, POWDER, LYOPHILIZED, FOR SOLUTION INTRAVENOUS EVERY 12 HOURS
Qty: 0 | Refills: 0 | Status: DISCONTINUED | OUTPATIENT
Start: 2018-05-04 | End: 2018-05-04

## 2018-05-03 RX ORDER — ALBUTEROL 90 UG/1
1 AEROSOL, METERED ORAL EVERY 4 HOURS
Qty: 0 | Refills: 0 | Status: DISCONTINUED | OUTPATIENT
Start: 2018-05-03 | End: 2018-05-09

## 2018-05-03 RX ORDER — VANCOMYCIN HCL 1 G
1000 VIAL (EA) INTRAVENOUS ONCE
Qty: 0 | Refills: 0 | Status: COMPLETED | OUTPATIENT
Start: 2018-05-03 | End: 2018-05-04

## 2018-05-03 RX ORDER — CIPROFLOXACIN LACTATE 400MG/40ML
400 VIAL (ML) INTRAVENOUS ONCE
Qty: 0 | Refills: 0 | Status: COMPLETED | OUTPATIENT
Start: 2018-05-03 | End: 2018-05-03

## 2018-05-03 RX ORDER — PIPERACILLIN AND TAZOBACTAM 4; .5 G/20ML; G/20ML
3.38 INJECTION, POWDER, LYOPHILIZED, FOR SOLUTION INTRAVENOUS ONCE
Qty: 0 | Refills: 0 | Status: COMPLETED | OUTPATIENT
Start: 2018-05-03 | End: 2018-05-03

## 2018-05-03 RX ADMIN — SODIUM CHLORIDE 75 MILLILITER(S): 9 INJECTION INTRAMUSCULAR; INTRAVENOUS; SUBCUTANEOUS at 21:46

## 2018-05-03 RX ADMIN — MIRTAZAPINE 15 MILLIGRAM(S): 45 TABLET, ORALLY DISINTEGRATING ORAL at 23:12

## 2018-05-03 RX ADMIN — HEPARIN SODIUM 5000 UNIT(S): 5000 INJECTION INTRAVENOUS; SUBCUTANEOUS at 23:12

## 2018-05-03 RX ADMIN — SODIUM CHLORIDE 1000 MILLILITER(S): 9 INJECTION INTRAMUSCULAR; INTRAVENOUS; SUBCUTANEOUS at 18:19

## 2018-05-03 RX ADMIN — PIPERACILLIN AND TAZOBACTAM 200 GRAM(S): 4; .5 INJECTION, POWDER, LYOPHILIZED, FOR SOLUTION INTRAVENOUS at 18:15

## 2018-05-03 RX ADMIN — Medication 200 MILLIGRAM(S): at 20:09

## 2018-05-03 RX ADMIN — ATORVASTATIN CALCIUM 40 MILLIGRAM(S): 80 TABLET, FILM COATED ORAL at 23:12

## 2018-05-03 NOTE — H&P ADULT - NSHPLABSRESULTS_GEN_ALL_CORE
.  LABS:                         12.4   34.40 )-----------( 375      ( 03 May 2018 15:54 )             37.9     05-03    135  |  99  |  45<H>  ----------------------------<  131<H>  4.7   |  26  |  1.39<H>    Ca    7.6<L>      03 May 2018 16:50    TPro  5.3<L>  /  Alb  1.5<L>  /  TBili  0.3  /  DBili  x   /  AST  12<L>  /  ALT  9<L>  /  AlkPhos  175<H>  05-03          Lactate, Blood: 1.9 mmol/L (05-03 @ 15:54)      RADIOLOGY, EKG & ADDITIONAL TESTS: Reviewed.

## 2018-05-03 NOTE — H&P ADULT - NSHPSOCIALHISTORY_GEN_ALL_CORE
Former smoker quit 10 years ago   1PPD x 60 years   No etoh.  Prior to hospitalization in February patient resided with daughter and was dependent of all adls

## 2018-05-03 NOTE — H&P ADULT - ASSESSMENT
This is 86 yo female with a history of COPD, and hypothyroidism recent hospitalization of r bilateral PNA now admitted for HCAP pneumonia complicated severe protein calorie nutrition and acute kidney injury.     Pulm: RML pneumonia HCAP + 2 MDR  given location of pneumonia and lethargy also may suggest probable aspiration pneumonia   check procalcitonin   continue with zosyn and Vanco renally dosed   speech and swallow   patient recently completed course fof Tamiflu for influenza exposure in NH facility would cover for post influenza MRSA   COPD: none o2 dependent   continue with nebs     Endo: Hypothyroidism   check TFTs   Patients Synthroid dose was recently changed in last 4 weeks     GI: Severe protein calorie malnutrition   supplement Prostat     Diarrhea  would check Stool studies   C. Diff     Heme: Anemia   presently appears to be hemoconcentrated due to dehydration   will start IVF     Cardio:   HLD- continue with statin     MSK:   Will require PT evaluation prior to discharge This is 86 yo female with a history of COPD, and hypothyroidism recent hospitalization of r bilateral PNA now admitted for HCAP pneumonia complicated severe protein calorie nutrition and acute kidney injury.     Pulm: RML pneumonia HCAP + 2 MDR  given location of pneumonia and lethargy also may suggest probable aspiration pneumonia   check procalcitonin   continue with zosyn and Vanco renally dosed   speech and swallow   patient recently completed course fof Tamiflu for influenza exposure in NH facility would cover for post influenza MRSA     Renal: DUNIA likely pre- renal   reassess in am after IVF     COPD: none o2 dependent   continue with nebs     Endo: Hypothyroidism   check TFTs   Patients Synthroid dose was recently changed in last 4 weeks     GI: Severe protein calorie malnutrition   supplement Prostat     Diarrhea  would check Stool studies   C. Diff     Heme: Anemia   presently appears to be hemoconcentrated due to dehydration   will start IVF     Cardio:   HLD- continue with statin     MSK:   Will require PT evaluation prior to discharge

## 2018-05-03 NOTE — H&P ADULT - NSHPPHYSICALEXAM_GEN_ALL_CORE
Vital signsT(C): 37 (05-03-18 @ 18:30), Max: 37 (05-03-18 @ 18:30)  HR: 89 (05-03-18 @ 18:30) (82 - 95)  BP: 99/53 (05-03-18 @ 18:30) (99/53 - 108/65)  RR: 18 (05-03-18 @ 18:30) (18 - 19)  SpO2: 100% (05-03-18 @ 18:30) (98% - 100%)  Wt(kg): --  General apperance: Patient in no acute distress AAOx 1  HEENT: No JVD, no cervical lymphadenopathy   CVS: S1 S2 no murmurs, rubs or gallops   Lung: Clear to auscultation bilaterally   Abdomen: Soft non tender dimished bowel sounds yet present  Extremity: bilateral pitting edema  lower extremity edema   MSK: 5/5 strength in all four extremities , sensation grossly intact throughout   Back: No spinal tenderness

## 2018-05-03 NOTE — ED ADULT NURSE NOTE - OBJECTIVE STATEMENT
Pt snt by pmd for evaluation of elevated wbc count.  Pt is afebrile upon arrival, denies complaints.

## 2018-05-03 NOTE — ED PROVIDER NOTE - PMH
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

## 2018-05-03 NOTE — ED ADULT NURSE NOTE - CHPI ED SYMPTOMS NEG
no pain/no fever/no tingling/no vomiting/no chills/no weakness/no nausea/no decreased eating/drinking/no dizziness/no numbness Eyeglasses

## 2018-05-03 NOTE — ED ADULT NURSE REASSESSMENT NOTE - NS ED NURSE REASSESS COMMENT FT1
straight cath attempted w/ 2 RN's. bladder scan revealed <100mL urine in bladder. attempted to call floor w/ update x3. pt being transported to 5S at this time.

## 2018-05-03 NOTE — H&P ADULT - HISTORY OF PRESENT ILLNESS
This is an 85-year-old female with a past medical history significant for hypothyroidism hyperlipidemia COPD recently hospitalized in February 2018 for subsequently was discharged to rehab facility presents from rehab facility for altered mental status energy and malaise.  The patient was seen by the house doctor and routine assessment of labs was noted to have a white blood cell count of 34.4 creatinine 1.18 baseline was 0.7 patient was brought to the emergency room she was afebrile. plain film revealed right middle lobe opacity.

## 2018-05-03 NOTE — ED ADULT TRIAGE NOTE - CHIEF COMPLAINT QUOTE
Patient comes to ED for AMS for a few days with WBC count 34,000 sent for evaluation. pt wears O2 3l nasal cannula

## 2018-05-03 NOTE — ED PROVIDER NOTE - PROGRESS NOTE DETAILS
Luis DO: S/o to Dr. Keys pending work up at this time. +inc WBC 34k, pneumonia on CXR, right mid ling. ordered hcap abx. admit to hospital . MD Ofe

## 2018-05-03 NOTE — ED PROVIDER NOTE - OBJECTIVE STATEMENT
84 y/o F w/ pmhx of COPD, hypothyroidism, congenital right leg shortening, osteoarthritis, presents to ED BIBA from [facility] for abnormal lab result, [place] [address], and urinary and bowel incontinence. Pt is A&O x1. Hx obtained through NH paperwork. 86 y/o F w/ pmhx of COPD, hypothyroidism, dementia, congenital right leg shortening, osteoarthritis, presents to ED BIBA from [facility] for abnormal lab result, [place] [address], and urinary and bowel incontinence. Pt is A&O x1. Hx obtained through NH paperwork.

## 2018-05-03 NOTE — ED PROVIDER NOTE - UNABLE TO OBTAIN
Pt unable to provide adequate HPI due to baseline dementia. Dementia pt unable to provide ROS due to baseline dementia.

## 2018-05-04 LAB
ANION GAP SERPL CALC-SCNC: 12 MMOL/L — SIGNIFICANT CHANGE UP (ref 5–17)
BUN SERPL-MCNC: 48 MG/DL — HIGH (ref 7–23)
C DIFF BY PCR RESULT: DETECTED
C DIFF TOX GENS STL QL NAA+PROBE: SIGNIFICANT CHANGE UP
CALCIUM SERPL-MCNC: 7.2 MG/DL — LOW (ref 8.5–10.1)
CHLORIDE SERPL-SCNC: 101 MMOL/L — SIGNIFICANT CHANGE UP (ref 96–108)
CO2 SERPL-SCNC: 22 MMOL/L — SIGNIFICANT CHANGE UP (ref 22–31)
CREAT SERPL-MCNC: 1.19 MG/DL — SIGNIFICANT CHANGE UP (ref 0.5–1.3)
GLUCOSE SERPL-MCNC: 143 MG/DL — HIGH (ref 70–99)
HCT VFR BLD CALC: 36.7 % — SIGNIFICANT CHANGE UP (ref 34.5–45)
HGB BLD-MCNC: 11.9 G/DL — SIGNIFICANT CHANGE UP (ref 11.5–15.5)
MCHC RBC-ENTMCNC: 27.8 PG — SIGNIFICANT CHANGE UP (ref 27–34)
MCHC RBC-ENTMCNC: 32.4 GM/DL — SIGNIFICANT CHANGE UP (ref 32–36)
MCV RBC AUTO: 85.7 FL — SIGNIFICANT CHANGE UP (ref 80–100)
PLATELET # BLD AUTO: 351 K/UL — SIGNIFICANT CHANGE UP (ref 150–400)
POTASSIUM SERPL-MCNC: 4.5 MMOL/L — SIGNIFICANT CHANGE UP (ref 3.5–5.3)
POTASSIUM SERPL-SCNC: 4.5 MMOL/L — SIGNIFICANT CHANGE UP (ref 3.5–5.3)
PROCALCITONIN SERPL-MCNC: 1.46 NG/ML — HIGH (ref 0–0.04)
RBC # BLD: 4.28 M/UL — SIGNIFICANT CHANGE UP (ref 3.8–5.2)
RBC # FLD: 16.4 % — HIGH (ref 10.3–14.5)
SODIUM SERPL-SCNC: 135 MMOL/L — SIGNIFICANT CHANGE UP (ref 135–145)
WBC # BLD: 42.22 K/UL — CRITICAL HIGH (ref 3.8–10.5)
WBC # FLD AUTO: 42.22 K/UL — CRITICAL HIGH (ref 3.8–10.5)

## 2018-05-04 PROCEDURE — 93010 ELECTROCARDIOGRAM REPORT: CPT

## 2018-05-04 RX ORDER — AZITHROMYCIN 500 MG/1
250 TABLET, FILM COATED ORAL DAILY
Qty: 0 | Refills: 0 | Status: COMPLETED | OUTPATIENT
Start: 2018-05-05 | End: 2018-05-07

## 2018-05-04 RX ORDER — CEFTRIAXONE 500 MG/1
1000 INJECTION, POWDER, FOR SOLUTION INTRAMUSCULAR; INTRAVENOUS EVERY 24 HOURS
Qty: 0 | Refills: 0 | Status: COMPLETED | OUTPATIENT
Start: 2018-05-04 | End: 2018-05-07

## 2018-05-04 RX ORDER — VANCOMYCIN HCL 1 G
125 VIAL (EA) INTRAVENOUS EVERY 6 HOURS
Qty: 0 | Refills: 0 | Status: DISCONTINUED | OUTPATIENT
Start: 2018-05-04 | End: 2018-05-05

## 2018-05-04 RX ORDER — AZITHROMYCIN 500 MG/1
500 TABLET, FILM COATED ORAL ONCE
Qty: 0 | Refills: 0 | Status: COMPLETED | OUTPATIENT
Start: 2018-05-04 | End: 2018-05-04

## 2018-05-04 RX ORDER — CEFTRIAXONE 500 MG/1
1 INJECTION, POWDER, FOR SOLUTION INTRAMUSCULAR; INTRAVENOUS EVERY 24 HOURS
Qty: 0 | Refills: 0 | Status: DISCONTINUED | OUTPATIENT
Start: 2018-05-04 | End: 2018-05-04

## 2018-05-04 RX ADMIN — Medication 125 MILLIGRAM(S): at 23:19

## 2018-05-04 RX ADMIN — HEPARIN SODIUM 5000 UNIT(S): 5000 INJECTION INTRAVENOUS; SUBCUTANEOUS at 13:31

## 2018-05-04 RX ADMIN — Medication 125 MILLIGRAM(S): at 17:20

## 2018-05-04 RX ADMIN — HEPARIN SODIUM 5000 UNIT(S): 5000 INJECTION INTRAVENOUS; SUBCUTANEOUS at 06:12

## 2018-05-04 RX ADMIN — AZITHROMYCIN 500 MILLIGRAM(S): 500 TABLET, FILM COATED ORAL at 12:02

## 2018-05-04 RX ADMIN — PIPERACILLIN AND TAZOBACTAM 25 GRAM(S): 4; .5 INJECTION, POWDER, LYOPHILIZED, FOR SOLUTION INTRAVENOUS at 06:12

## 2018-05-04 RX ADMIN — ATORVASTATIN CALCIUM 40 MILLIGRAM(S): 80 TABLET, FILM COATED ORAL at 22:08

## 2018-05-04 RX ADMIN — CEFTRIAXONE 1000 MILLIGRAM(S): 500 INJECTION, POWDER, FOR SOLUTION INTRAMUSCULAR; INTRAVENOUS at 12:02

## 2018-05-04 RX ADMIN — HEPARIN SODIUM 5000 UNIT(S): 5000 INJECTION INTRAVENOUS; SUBCUTANEOUS at 22:08

## 2018-05-04 RX ADMIN — Medication 125 MICROGRAM(S): at 06:12

## 2018-05-04 RX ADMIN — Medication 125 MILLIGRAM(S): at 13:31

## 2018-05-04 RX ADMIN — MIRTAZAPINE 15 MILLIGRAM(S): 45 TABLET, ORALLY DISINTEGRATING ORAL at 22:09

## 2018-05-04 RX ADMIN — Medication 0.25 MILLIGRAM(S): at 19:47

## 2018-05-04 RX ADMIN — Medication 250 MILLIGRAM(S): at 00:49

## 2018-05-04 RX ADMIN — OLANZAPINE 5 MILLIGRAM(S): 15 TABLET, FILM COATED ORAL at 12:02

## 2018-05-04 NOTE — PROGRESS NOTE ADULT - SUBJECTIVE AND OBJECTIVE BOX
This is an 85-year-old female with a past medical history significant for hypothyroidism hyperlipidemia COPD recently hospitalized in February 2018 for subsequently was discharged to rehab facility presents from rehab facility for altered mental status energy and malaise. Adm for poss PNA.    5/4: eating, no c/o' denies cough is on home O2 she tells me    Vital Signs Last 24 Hrs  T(C): 37 (04 May 2018 05:55), Max: 37 (03 May 2018 18:30)  T(F): 98.6 (04 May 2018 05:55), Max: 98.6 (03 May 2018 18:30)  HR: 104 (04 May 2018 05:55) (82 - 104)  BP: 98/48 (04 May 2018 05:55) (92/53 - 108/65)  BP(mean): --  RR: 19 (04 May 2018 05:55) (18 - 19)  SpO2: 95% (04 May 2018 05:55) (95% - 100%) This is an 85-year-old female with a past medical history significant for hypothyroidism hyperlipidemia COPD recently hospitalized in February 2018 for subsequently was discharged to rehab facility presents from rehab facility for altered mental status energy and malaise. Adm for poss PNA.    5/4: eating, no c/o' denies cough is on home O2 she tells me; wbc continues to rise; had diarrhea    Vital Signs Last 24 Hrs  T(C): 37 (04 May 2018 05:55), Max: 37 (03 May 2018 18:30)  T(F): 98.6 (04 May 2018 05:55), Max: 98.6 (03 May 2018 18:30)  HR: 104 (04 May 2018 05:55) (82 - 104)  BP: 98/48 (04 May 2018 05:55) (92/53 - 108/65)  BP(mean): --  RR: 19 (04 May 2018 05:55) (18 - 19)  SpO2: 95% (04 May 2018 05:55) (95% - 100%)    	General appearance Patient in no acute distress   	HEENT: No JVD, no cervical lymphadenopathy   	CVS: S1 S2 no murmurs, rubs or gallops   	Lung: Clear to auscultation bilaterally   	Abdomen: Soft non tender diminished bowel sounds   	Extremity:+1 edema  	MSK: 5/5 strength in all four extremities , sensation grossly intact throughout               Skin: no rash    A/P:    LEUKOCYTOSIS  DIARRHEA  WEAKNESS  CHR RESP FAILURE ON HOME O2 FOR COPD    - empiric PO vanco  - change to This is an 85-year-old female with a past medical history significant for hypothyroidism hyperlipidemia COPD recently hospitalized in February 2018 for subsequently was discharged to rehab facility presents from rehab facility for altered mental status energy and malaise. Adm for poss PNA.    5/4: eating, no c/o' denies cough is on home O2 she tells me; wbc continues to rise; had diarrhea    Vital Signs Last 24 Hrs  T(C): 37 (04 May 2018 05:55), Max: 37 (03 May 2018 18:30)  T(F): 98.6 (04 May 2018 05:55), Max: 98.6 (03 May 2018 18:30)  HR: 104 (04 May 2018 05:55) (82 - 104)  BP: 98/48 (04 May 2018 05:55) (92/53 - 108/65)  BP(mean): --  RR: 19 (04 May 2018 05:55) (18 - 19)  SpO2: 95% (04 May 2018 05:55) (95% - 100%)    	General appearance Patient in no acute distress   	HEENT: No JVD, no cervical lymphadenopathy   	CVS: S1 S2 no murmurs, rubs or gallops   	Lung: Clear to auscultation bilaterally   	Abdomen: Soft non tender diminished bowel sounds   	Extremity:+1 edema  	MSK: 5/5 strength in all four extremities , sensation grossly intact throughout               Skin: no rash    A/P:    LEUKOCYTOSIS  DIARRHEA  WEAKNESS  CHR RESP FAILURE ON HOME O2 FOR COPD    - empiric PO vanco  - change to ceftriaxone and zithro  - no urine sent too late for cx she received few rounds of abx; monitor  - c/w O supplement  - tachy reported per nurse ; check ekg  - PT eval

## 2018-05-04 NOTE — SWALLOW BEDSIDE ASSESSMENT ADULT - SWALLOW EVAL: DIAGNOSIS
1) The patient demonstrates mild functional Oropharyngeal Presbyphagia with presbyphagic features that may appear heightened at times when her dementia/COPD sequel exacerbate. With that being stated, the patient demonstrated sufficient oropharyngeal swallowing preservation for age(85) nonetheless on clinical exam. Note that while Presbyphagia may place pt at a relatively heightened risk for episodic aspiration, there were NO behavioral aspiration signs exhibited on exam.  2) Pt was alert and interactive but somewhat anxious as well as internally distractible at times. She could not be directed to structured communication tasks but she did sometimes verbally respond when asked  concrete personally relevant questions. At these times, her motor speech abilities were felt to be functional, her speech output was intelligible and her utterances were linguistically intact, albeit somewhat brief/reflective of reduced insight c/w baseline Cognitive Dysfunction associated with dementia.

## 2018-05-04 NOTE — SWALLOW BEDSIDE ASSESSMENT ADULT - ADDITIONAL RECOMMENDATIONS
Pt should be wearing U/L dentures during meals and her dentures should be well fastened, NOTE THAT IF PT NEEDS TO BE ON A LIQUID DIET FOR GI REASONS, SHE IS ABLE TO TOLERATE THIN LIQUIDS FROM AN OROPHARYNGEAL SWALLOWING STANCE ON CLINICAL EXAM.

## 2018-05-04 NOTE — SWALLOW BEDSIDE ASSESSMENT ADULT - SLP GENERAL OBSERVATIONS
Pt seen at bedside.  On encounter, a cervical kyphosis was evident and some loss of bulk was noted in strap muscle regions. An occasional moist non nutritive cough was produced non nutritively but she was not in overt respiratory distress. Pt was alert and interactive but somewhat anxious as well as internally distractible at times. She could not be directed to structured communication tasks but she did sometimes verbally respond when asked  concrete personally relevant questions. At these times, her motor speech abilities were felt to be functional, her speech output was intelligible and her utterances were linguistically intact, albeit somewhat brief/reflective of reduced insight c/w baseline Cognitive Dysfunction associated with dementia.

## 2018-05-04 NOTE — SWALLOW BEDSIDE ASSESSMENT ADULT - DIET PRIOR TO ADMI
The patient was reportedly on a regular texture diet with thin liquids at [facility]. The patient was reportedly on a regular texture diet with thin liquids at [facility], and was also on a regular texture diet with thin liquids when seen by this service during a prior hospitalization in 3/18.

## 2018-05-04 NOTE — SWALLOW BEDSIDE ASSESSMENT ADULT - COMMENTS
This patient was admitted to  from [facility] with lethargy as well as altered mentation. Hospital course is notable for leukocytosis, diarrhea and generalized weakness. This profile is superimposed upon a prior history of a hospitalization at this facility on 3/18 status post fall. Hospital course at the time was notable for left hip fracture status post IM nailing and development of left sided pneumonia.  She was also seen by this service during prior hospitalization in 3/18 and diagnosed with chronic Cognitive Dysfunction/functional Presbyphagia. No need for speech path follow up was suggested at the time. Additional prior medical history is remarkable for COPD, dementia, depression, hypothyroidism, HLD, OA and a congential shortening of her right leg. This patient was admitted to  from [facility] with lethargy as well as altered mentation. Hospital course is notable for leukocytosis, diarrhea and generalized weakness. This profile is superimposed upon a prior history of a hospitalization at this facility in 3/18 status post fall. Hospital course at the time was notable for left hip fracture status post IM nailing and development of left sided pneumonia.  She was also seen by this service during prior hospitalization in 3/18 and diagnosed with chronic Cognitive Dysfunction/functional Presbyphagia. No need for acute speech path follow up was suggested at the time. Additional prior medical history is remarkable for COPD, dementia, depression, hypothyroidism, HLD, OA and a congential shortening of her right leg.

## 2018-05-05 LAB
ANION GAP SERPL CALC-SCNC: 8 MMOL/L — SIGNIFICANT CHANGE UP (ref 5–17)
BUN SERPL-MCNC: 37 MG/DL — HIGH (ref 7–23)
CALCIUM SERPL-MCNC: 7.2 MG/DL — LOW (ref 8.5–10.1)
CHLORIDE SERPL-SCNC: 106 MMOL/L — SIGNIFICANT CHANGE UP (ref 96–108)
CO2 SERPL-SCNC: 23 MMOL/L — SIGNIFICANT CHANGE UP (ref 22–31)
CREAT SERPL-MCNC: 0.67 MG/DL — SIGNIFICANT CHANGE UP (ref 0.5–1.3)
GLUCOSE SERPL-MCNC: 84 MG/DL — SIGNIFICANT CHANGE UP (ref 70–99)
HCT VFR BLD CALC: 31.8 % — LOW (ref 34.5–45)
HGB BLD-MCNC: 10.2 G/DL — LOW (ref 11.5–15.5)
MCHC RBC-ENTMCNC: 28 PG — SIGNIFICANT CHANGE UP (ref 27–34)
MCHC RBC-ENTMCNC: 32.1 GM/DL — SIGNIFICANT CHANGE UP (ref 32–36)
MCV RBC AUTO: 87.4 FL — SIGNIFICANT CHANGE UP (ref 80–100)
NRBC # BLD: 0 /100 WBCS — SIGNIFICANT CHANGE UP (ref 0–0)
PLATELET # BLD AUTO: 313 K/UL — SIGNIFICANT CHANGE UP (ref 150–400)
POTASSIUM SERPL-MCNC: 3.8 MMOL/L — SIGNIFICANT CHANGE UP (ref 3.5–5.3)
POTASSIUM SERPL-SCNC: 3.8 MMOL/L — SIGNIFICANT CHANGE UP (ref 3.5–5.3)
RBC # BLD: 3.64 M/UL — LOW (ref 3.8–5.2)
RBC # FLD: 16.4 % — HIGH (ref 10.3–14.5)
SODIUM SERPL-SCNC: 137 MMOL/L — SIGNIFICANT CHANGE UP (ref 135–145)
WBC # BLD: 32.03 K/UL — HIGH (ref 3.8–10.5)
WBC # FLD AUTO: 32.03 K/UL — HIGH (ref 3.8–10.5)

## 2018-05-05 RX ORDER — VANCOMYCIN HCL 1 G
500 VIAL (EA) INTRAVENOUS EVERY 6 HOURS
Qty: 0 | Refills: 0 | Status: DISCONTINUED | OUTPATIENT
Start: 2018-05-05 | End: 2018-05-09

## 2018-05-05 RX ADMIN — AZITHROMYCIN 250 MILLIGRAM(S): 500 TABLET, FILM COATED ORAL at 13:45

## 2018-05-05 RX ADMIN — Medication 0.25 MILLIGRAM(S): at 19:49

## 2018-05-05 RX ADMIN — Medication 125 MICROGRAM(S): at 05:49

## 2018-05-05 RX ADMIN — CEFTRIAXONE 1000 MILLIGRAM(S): 500 INJECTION, POWDER, FOR SOLUTION INTRAMUSCULAR; INTRAVENOUS at 13:53

## 2018-05-05 RX ADMIN — Medication 125 MILLIGRAM(S): at 13:44

## 2018-05-05 RX ADMIN — HEPARIN SODIUM 5000 UNIT(S): 5000 INJECTION INTRAVENOUS; SUBCUTANEOUS at 15:54

## 2018-05-05 RX ADMIN — MIRTAZAPINE 15 MILLIGRAM(S): 45 TABLET, ORALLY DISINTEGRATING ORAL at 21:05

## 2018-05-05 RX ADMIN — SODIUM CHLORIDE 75 MILLILITER(S): 9 INJECTION INTRAMUSCULAR; INTRAVENOUS; SUBCUTANEOUS at 05:04

## 2018-05-05 RX ADMIN — ATORVASTATIN CALCIUM 40 MILLIGRAM(S): 80 TABLET, FILM COATED ORAL at 21:05

## 2018-05-05 RX ADMIN — HEPARIN SODIUM 5000 UNIT(S): 5000 INJECTION INTRAVENOUS; SUBCUTANEOUS at 05:49

## 2018-05-05 RX ADMIN — OLANZAPINE 5 MILLIGRAM(S): 15 TABLET, FILM COATED ORAL at 13:45

## 2018-05-05 RX ADMIN — HEPARIN SODIUM 5000 UNIT(S): 5000 INJECTION INTRAVENOUS; SUBCUTANEOUS at 21:05

## 2018-05-05 RX ADMIN — Medication 500 MILLIGRAM(S): at 18:13

## 2018-05-05 RX ADMIN — Medication 125 MILLIGRAM(S): at 05:49

## 2018-05-05 NOTE — PHYSICAL THERAPY INITIAL EVALUATION ADULT - CRITERIA FOR SKILLED THERAPEUTIC INTERVENTIONS
predicted duration of therapy intervention/anticipated equipment needs at discharge/impairments found/risk reduction/prevention/rehab potential/anticipated discharge recommendation

## 2018-05-05 NOTE — PROGRESS NOTE ADULT - SUBJECTIVE AND OBJECTIVE BOX
This is an 85-year-old female with a past medical history significant for hypothyroidism hyperlipidemia COPD recently hospitalized in February 2018 for subsequently was discharged to rehab facility presents from rehab facility for altered mental status energy and malaise. Adm for poss PNA.    5/4: eating, no c/o' denies cough is on home O2 she tells me; wbc continues to rise; had diarrhea  5/5- Feels well    Vital Signs Last 24 Hrs  T(C): 36.6 (05 May 2018 05:08), Max: 36.6 (05 May 2018 05:08)  T(F): 97.9 (05 May 2018 05:08), Max: 97.9 (05 May 2018 05:08)  HR: 99 (05 May 2018 05:08) (94 - 100)  BP: 118/58 (05 May 2018 05:08) (100/52 - 118/58)  BP(mean): --  RR: 18 (05 May 2018 05:08) (18 - 18)  SpO2: 97% (05 May 2018 05:08) (97% - 99%)    	General appearance Patient in no acute distress   	HEENT: No JVD, no cervical lymphadenopathy   	CVS: S1 S2 no murmurs, rubs or gallops   	Lung: Clear to auscultation bilaterally   	Abdomen: Soft non tender diminished bowel sounds   	Extremity:+1 edema  	MSK: 5/5 strength in all four extremities , sensation grossly intact throughout               Skin: no rash                            10.2   32.03 )-----------( 313      ( 05 May 2018 08:12 )             31.8     05-05    137  |  106  |  37<H>  ----------------------------<  84  3.8   |  23  |  0.67    Ca    7.2<L>      05 May 2018 08:12    TPro  5.3<L>  /  Alb  1.5<L>  /  TBili  0.3  /  DBili  x   /  AST  12<L>  /  ALT  9<L>  /  AlkPhos  175<H>  05-03        LIVER FUNCTIONS - ( 03 May 2018 16:50 )  Alb: 1.5 g/dL / Pro: 5.3 gm/dL / ALK PHOS: 175 U/L / ALT: 9 U/L / AST: 12 U/L / GGT: x               ABG - ( 03 May 2018 20:11 )  pH, Arterial: 7.48  pH, Blood: x     /  pCO2: 31    /  pO2: 96    / HCO3: 23    / Base Excess: .3    /  SaO2: 97                  A/P:    LEUKOCYTOSIS  DIARRHEA  WEAKNESS  CHR RESP FAILURE ON HOME O2 FOR COPD    - empiric PO vanco  - change to ceftriaxone and zithro  - no urine sent too late for cx she received few rounds of abx; monitor  - c/w O supplement  - tachy reported per nurse ; check ekg  - PT eval This is an 85-year-old female with a past medical history significant for hypothyroidism hyperlipidemia COPD recently hospitalized in February 2018 for subsequently was discharged to rehab facility presents from rehab facility for altered mental status energy and malaise. Adm for poss PNA.    5/4: eating, no c/o' denies cough is on home O2 she tells me; wbc continues to rise; had diarrhea  5/5- poor appetite, two episodes of diarrhea today.    Vital Signs Last 24 Hrs  T(C): 36.6 (05 May 2018 05:08), Max: 36.6 (05 May 2018 05:08)  T(F): 97.9 (05 May 2018 05:08), Max: 97.9 (05 May 2018 05:08)  HR: 99 (05 May 2018 05:08) (94 - 100)  BP: 118/58 (05 May 2018 05:08) (100/52 - 118/58)  BP(mean): --  RR: 18 (05 May 2018 05:08) (18 - 18)  SpO2: 97% (05 May 2018 05:08) (97% - 99%)    GEN: lying in bed, NAD  HEENT:   NC/AT, pupils equal and reactive, EOMI  CV:  +S1, +S2, RRR  RESP:   lungs clear to auscultation bilaterally, no wheeze, rales, rhonchi   BREAST:  not examined  GI:  abdomen soft, non-tender, non-distended, normoactive BS  RECTAL:  not examined  :  not examined  MSK:   normal muscle tone  EXT:  no edema  NEURO:  AAOX3, no focal neurological deficits  SKIN:  no rashes                        10.2   32.03 )-----------( 313      ( 05 May 2018 08:12 )             31.8     05-05    137  |  106  |  37<H>  ----------------------------<  84  3.8   |  23  |  0.67    Ca    7.2<L>      05 May 2018 08:12    TPro  5.3<L>  /  Alb  1.5<L>  /  TBili  0.3  /  DBili  x   /  AST  12<L>  /  ALT  9<L>  /  AlkPhos  175<H>  05-03        LIVER FUNCTIONS - ( 03 May 2018 16:50 )  Alb: 1.5 g/dL / Pro: 5.3 gm/dL / ALK PHOS: 175 U/L / ALT: 9 U/L / AST: 12 U/L / GGT: x               ABG - ( 03 May 2018 20:11 )  pH, Arterial: 7.48  pH, Blood: x     /  pCO2: 31    /  pO2: 96    / HCO3: 23    / Base Excess: .3    /  SaO2: 97                  A/P: pt admitted with possible PNA and now with c.diff    # c.diff - case d/w ID and vanco dose increase  - supportive care. continue IVF  - monitor and replete lytes    # CAP/CHR RESP FAILURE ON HOME O2 FOR COPD  - continue rocephin and zithro day 3 of 5    dispo - await PT eval

## 2018-05-05 NOTE — PHYSICAL THERAPY INITIAL EVALUATION ADULT - PERTINENT HX OF CURRENT PROBLEM, REHAB EVAL
pt was recently hospitalized in February 2018 for PNA,  subsequently was discharged to rehab facility, now presents from rehab facility for altered mental status and malaise. X rays reveal right middle lobe opacity.-PNA , pt states she was in rehab after she broke her rt hip? pt is poor historian

## 2018-05-05 NOTE — CONSULT NOTE ADULT - ASSESSMENT
This is an 85-year-old female with a past medical history significant for hypothyroidism hyperlipidemia COPD recently hospitalized in February 2018 admitted on 5/3 for evaluation of weakness and fatigue; patient started to have diarrhea, now found to have cdiff; nursing noted at least 2 loose stools earlier; history per medical record as patient is poor historian. Upon admission she was started on treatment for pneumonia, currently on ceftriaxone and zithromax.  1. Patient admitted with weakness; found to possibly have pneumonia, also with profound leukocytosis and found to have CDiff colitis  - follow up cultures   - iv hydration and supportive care   - serial cbc and monitor temperature   - day #3 antibiotics; will expect to complete 5 day course, especially in setting of cdiff  - contact isolation  - will continue po vancomycin but increase dose to 500 mg po q 6 hours  2. other issues: hypothyroidism hyperlipidemia COPD  - per medicine

## 2018-05-05 NOTE — PHYSICAL THERAPY INITIAL EVALUATION ADULT - IMPAIRMENTS FOUND, PT EVAL
gait, locomotion, and balance/gross motor/cognitive impairment/muscle strength/arousal, attention, and cognition

## 2018-05-05 NOTE — CONSULT NOTE ADULT - SUBJECTIVE AND OBJECTIVE BOX
Patient is a 85y old  Female who presents with a chief complaint of My mother was weak and tired (03 May 2018 19:31)    HPI:  This is an 85-year-old female with a past Medications and Vitals reviewed onical history significant for hypothyroidism hyperlipidemia COPD recently hospitalized in February 2018 admitted on 5/3 for evaluation of weakness and fatigue; patient started to have diarrhea, now found to have cdiff; nursing noted at least 2 loose stools earlier; history per medical record as patient is poor historian. Upon admission she was started on treatment for pneumonia, currently on ceftriaxone and zithromax.              PMH: as above  PSH: as above  Meds: per reconciliation sheet, noted below  MEDICATIONS  (STANDING):  atorvastatin 40 milliGRAM(s) Oral at bedtime  azithromycin   Tablet 250 milliGRAM(s) Oral daily  buDESOnide   0.25 milliGRAM(s) Respule 0.25 milliGRAM(s) Inhalation every 12 hours  cefTRIAXone Injectable. 1000 milliGRAM(s) IV Push every 24 hours  heparin  Injectable 5000 Unit(s) SubCutaneous every 8 hours  levothyroxine 125 MICROGram(s) Oral daily  mirtazapine 15 milliGRAM(s) Oral at bedtime  OLANZapine 5 milliGRAM(s) Oral daily  sodium chloride 0.9%. 1000 milliLiter(s) (75 mL/Hr) IV Continuous <Continuous>  vancomycin    Solution 500 milliGRAM(s) Oral every 6 hours    MEDICATIONS  (PRN):  acetaminophen   Tablet 650 milliGRAM(s) Oral every 6 hours PRN For Temp greater than 38.5 C (101.3 F)  ALBUTerol    90 MICROgram(s) HFA Inhaler 1 Puff(s) Inhalation every 4 hours PRN Shortness of Breath and/or Wheezing    Allergies    No Known Allergies    Intolerances      Social: no smoking, no alcohol, no illegal drugs; no recent travel, no exposure to TB  FAMILY HISTORY:  Family history of lung cancer (Sibling)  Family history of stomach cancer (Sibling)     no history of premature cardivascular disease in first degree relatives  ROS: unable to obtain secondary to patient medical condition     Vital Signs Last 24 Hrs  T(C): 36.6 (05 May 2018 12:57), Max: 36.6 (05 May 2018 05:08)  T(F): 97.9 (05 May 2018 12:57), Max: 97.9 (05 May 2018 05:08)  HR: 93 (05 May 2018 12:57) (93 - 100)  BP: 91/45 (05 May 2018 12:57) (91/45 - 118/58)  BP(mean): --  RR: 18 (05 May 2018 12:57) (18 - 18)  SpO2: 100% (05 May 2018 12:57) (97% - 100%)  Daily     Daily     PE:    Constitutional: frail looking  HEENT: NC/AT, EOMI, PERRLA, conjunctivae clear; ears and nose atraumatic; pharynx clear  Neck: supple; thyroid not palpable  Back: no tenderness  Respiratory: respiratory effort normal; clear to auscultation  Cardiovascular: S1S2 regular, no murmurs  Abdomen: soft, not tender, not distended, positive BS; no liver or spleen organomegaly  Genitourinary: no suprapubic tenderness  Lymphatic: no LN palpable  Musculoskeletal: no muscle tenderness, no joint swelling or tenderness  Neurological/ Psychiatric: AxOx2, judgement and insight normal;  moving all extremities  Skin: no rashes; no palpable lesions    Labs: all available labs reviewed                        10.2   32.03 )-----------( 313      ( 05 May 2018 08:12 )             31.8     05-05    137  |  106  |  37<H>  ----------------------------<  84  3.8   |  23  |  0.67    Ca    7.2<L>      05 May 2018 08:12    TPro  5.3<L>  /  Alb  1.5<L>  /  TBili  0.3  /  DBili  x   /  AST  12<L>  /  ALT  9<L>  /  AlkPhos  175<H>  05-03     LIVER FUNCTIONS - ( 03 May 2018 16:50 )  Alb: 1.5 g/dL / Pro: 5.3 gm/dL / ALK PHOS: 175 U/L / ALT: 9 U/L / AST: 12 U/L / GGT: x           < from: Xray Chest 1 View AP/PA. (05.03.18 @ 15:49) >  IMPRESSION:     Right midlung opacity, possibly pneumonia in the appropriate clinical   setting. Follow-up to complete resolution is recommended to exclude   underlying neoplasm.    < end of copied text >        Radiology: all available radiological tests reviewed    Advanced directives addressed: full resuscitation

## 2018-05-05 NOTE — PHYSICAL THERAPY INITIAL EVALUATION ADULT - ORIENTATION, REHAB EVAL
pt unsure about hospital and rehab name, why is she here, does not recall if she able to amb/device used?/person

## 2018-05-06 RX ADMIN — HEPARIN SODIUM 5000 UNIT(S): 5000 INJECTION INTRAVENOUS; SUBCUTANEOUS at 21:32

## 2018-05-06 RX ADMIN — OLANZAPINE 5 MILLIGRAM(S): 15 TABLET, FILM COATED ORAL at 12:02

## 2018-05-06 RX ADMIN — AZITHROMYCIN 250 MILLIGRAM(S): 500 TABLET, FILM COATED ORAL at 12:02

## 2018-05-06 RX ADMIN — SODIUM CHLORIDE 75 MILLILITER(S): 9 INJECTION INTRAMUSCULAR; INTRAVENOUS; SUBCUTANEOUS at 07:59

## 2018-05-06 RX ADMIN — Medication 500 MILLIGRAM(S): at 05:05

## 2018-05-06 RX ADMIN — ATORVASTATIN CALCIUM 40 MILLIGRAM(S): 80 TABLET, FILM COATED ORAL at 21:33

## 2018-05-06 RX ADMIN — Medication 500 MILLIGRAM(S): at 12:02

## 2018-05-06 RX ADMIN — CEFTRIAXONE 1000 MILLIGRAM(S): 500 INJECTION, POWDER, FOR SOLUTION INTRAMUSCULAR; INTRAVENOUS at 12:02

## 2018-05-06 RX ADMIN — Medication 500 MILLIGRAM(S): at 00:17

## 2018-05-06 RX ADMIN — HEPARIN SODIUM 5000 UNIT(S): 5000 INJECTION INTRAVENOUS; SUBCUTANEOUS at 05:05

## 2018-05-06 RX ADMIN — MIRTAZAPINE 15 MILLIGRAM(S): 45 TABLET, ORALLY DISINTEGRATING ORAL at 21:32

## 2018-05-06 RX ADMIN — HEPARIN SODIUM 5000 UNIT(S): 5000 INJECTION INTRAVENOUS; SUBCUTANEOUS at 16:10

## 2018-05-06 RX ADMIN — Medication 500 MILLIGRAM(S): at 17:47

## 2018-05-06 RX ADMIN — Medication 125 MICROGRAM(S): at 05:05

## 2018-05-06 NOTE — PROGRESS NOTE ADULT - SUBJECTIVE AND OBJECTIVE BOX
This is an 85-year-old female with a past medical history significant for hypothyroidism hyperlipidemia COPD recently hospitalized in February 2018 for subsequently was discharged to rehab facility presents from rehab facility for altered mental status energy and malaise. Adm for poss PNA.    5/4: eating, no c/o' denies cough is on home O2 she tells me; wbc continues to rise; had diarrhea  5/5- poor appetite, two episodes of diarrhea today.  5/6-Diarrhea resolving    Vital Signs Last 24 Hrs  T(C): 36.6 (06 May 2018 12:22), Max: 36.7 (06 May 2018 04:46)  T(F): 97.8 (06 May 2018 12:22), Max: 98 (06 May 2018 04:46)  HR: 101 (06 May 2018 12:22) (88 - 101)  BP: 115/95 (06 May 2018 12:22) (100/50 - 116/53)  BP(mean): --  RR: 19 (06 May 2018 12:22) (18 - 20)  SpO2: 100% (06 May 2018 12:22) (98% - 100%)    GEN: lying in bed, NAD  HEENT:   NC/AT, pupils equal and reactive, EOMI  CV:  +S1, +S2, RRR  RESP:   lungs clear to auscultation bilaterally, crackles at the base  BREAST:  not examined  GI:  abdomen soft, non-tender, non-distended, normoactive BS  RECTAL:  not examined  :  not examined  MSK:   normal muscle tone  EXT:  no edema  NEURO:  AAOX3, no focal neurological deficits  SKIN:  no rashes                                       10.2   32.03 )-----------( 313      ( 05 May 2018 08:12 )             31.8     05-05    137  |  106  |  37<H>  ----------------------------<  84  3.8   |  23  |  0.67    Ca    7.2<L>      05 May 2018 08:12        A/P: pt admitted with possible PNA and now with c.diff    # c.diff - case d/w ID and vanco dose increase  - supportive care  - monitor and replete lytes    # CAP/CHR RESP FAILURE ON HOME O2 FOR COPD  - continue rocephin and zithro day 3 of 5  -D/C IVF    dispo - await PT eval   - to call Daughter Monday to discuss This is an 85-year-old female with a past medical history significant for hypothyroidism hyperlipidemia COPD recently hospitalized in February 2018 for subsequently was discharged to rehab facility presents from rehab facility for altered mental status energy and malaise. Adm for poss PNA.    5/4: eating, no c/o' denies cough is on home O2 she tells me; wbc continues to rise; had diarrhea  5/5- poor appetite, two episodes of diarrhea today.  5/6-  two loose BM today     Vital Signs Last 24 Hrs  T(C): 36.6 (06 May 2018 12:22), Max: 36.7 (06 May 2018 04:46)  T(F): 97.8 (06 May 2018 12:22), Max: 98 (06 May 2018 04:46)  HR: 101 (06 May 2018 12:22) (88 - 101)  BP: 115/95 (06 May 2018 12:22) (100/50 - 116/53)  BP(mean): --  RR: 19 (06 May 2018 12:22) (18 - 20)  SpO2: 100% (06 May 2018 12:22) (98% - 100%)    GEN: lying in bed, NAD  HEENT:   NC/AT, pupils equal and reactive, EOMI  CV:  +S1, +S2, RRR  RESP:   lungs clear to auscultation bilaterally, crackles at the base  BREAST:  not examined  GI:  abdomen soft, non-tender, non-distended, normoactive BS  RECTAL:  not examined  :  not examined  MSK:   normal muscle tone  EXT:  no edema  NEURO:  AAOX3, no focal neurological deficits  SKIN:  no rashes                                       10.2   32.03 )-----------( 313      ( 05 May 2018 08:12 )             31.8     05-05    137  |  106  |  37<H>  ----------------------------<  84  3.8   |  23  |  0.67    Ca    7.2<L>      05 May 2018 08:12        A/P: pt admitted with possible PNA and now with c.diff    # c.diff - case d/w ID and continue current vanco dose  - supportive care  - monitor and replete lytes    # CAP/CHR RESP FAILURE ON HOME O2 FOR COPD  - continue rocephin and zithro day 3 of 5  -D/C IVF    dispo - await PT eval   - to call Daughter Monday to discuss

## 2018-05-06 NOTE — PROGRESS NOTE ADULT - SUBJECTIVE AND OBJECTIVE BOX
Patient is a 85y old  Female who presents with a chief complaint of My mother was weak and tired (03 May 2018 19:31)    Date of service: 05-06-18 @ 13:19  Patient wants to sleep; lethargic but arousable  ROS: unable to obtain secondary to patient medical condition     MEDICATIONS  (STANDING):  atorvastatin 40 milliGRAM(s) Oral at bedtime  azithromycin   Tablet 250 milliGRAM(s) Oral daily  buDESOnide   0.25 milliGRAM(s) Respule 0.25 milliGRAM(s) Inhalation every 12 hours  cefTRIAXone Injectable. 1000 milliGRAM(s) IV Push every 24 hours  heparin  Injectable 5000 Unit(s) SubCutaneous every 8 hours  levothyroxine 125 MICROGram(s) Oral daily  mirtazapine 15 milliGRAM(s) Oral at bedtime  OLANZapine 5 milliGRAM(s) Oral daily  sodium chloride 0.9%. 1000 milliLiter(s) (75 mL/Hr) IV Continuous <Continuous>  vancomycin    Solution 500 milliGRAM(s) Oral every 6 hours    MEDICATIONS  (PRN):  acetaminophen   Tablet 650 milliGRAM(s) Oral every 6 hours PRN For Temp greater than 38.5 C (101.3 F)  ALBUTerol    90 MICROgram(s) HFA Inhaler 1 Puff(s) Inhalation every 4 hours PRN Shortness of Breath and/or Wheezing      Vital Signs Last 24 Hrs  T(C): 36.6 (06 May 2018 12:22), Max: 36.7 (06 May 2018 04:46)  T(F): 97.8 (06 May 2018 12:22), Max: 98 (06 May 2018 04:46)  HR: 101 (06 May 2018 12:22) (88 - 101)  BP: 115/95 (06 May 2018 12:22) (100/50 - 116/53)  BP(mean): --  RR: 19 (06 May 2018 12:22) (18 - 20)  SpO2: 100% (06 May 2018 12:22) (98% - 100%)    Physical Exam:        PE:    Constitutional: frail looking  HEENT: NC/AT, EOMI, PERRLA, conjunctivae clear; ears and nose atraumatic; pharynx clear  Neck: supple; thyroid not palpable  Back: no tenderness  Respiratory: respiratory effort normal; clear to auscultation  Cardiovascular: S1S2 regular, no murmurs  Abdomen: soft, not tender, not distended, positive BS; no liver or spleen organomegaly  Genitourinary: no suprapubic tenderness  Lymphatic: no LN palpable  Musculoskeletal: no muscle tenderness, no joint swelling or tenderness  Neurological/ Psychiatric: AxOx2, judgement and insight normal;  moving all extremities  Skin: no rashes; no palpable lesions    Labs: all available labs reviewed                        10.2   32.03 )-----------( 313      ( 05 May 2018 08:12 )             31.8     05-05    137  |  106  |  37<H>  ----------------------------<  84  3.8   |  23  |  0.67    Ca    7.2<L>      05 May 2018 08:12    TPro  5.3<L>  /  Alb  1.5<L>  /  TBili  0.3  /  DBili  x   /  AST  12<L>  /  ALT  9<L>  /  AlkPhos  175<H>  05-03     LIVER FUNCTIONS - ( 03 May 2018 16:50 )  Alb: 1.5 g/dL / Pro: 5.3 gm/dL / ALK PHOS: 175 U/L / ALT: 9 U/L / AST: 12 U/L / GGT: x           < from: Xray Chest 1 View AP/PA. (05.03.18 @ 15:49) >  IMPRESSION:     Right midlung opacity, possibly pneumonia in the appropriate clinical   setting. Follow-up to complete resolution is recommended to exclude   underlying neoplasm.    < end of copied text >        Radiology: all available radiological tests reviewed    Advanced directives addressed: full resuscitation

## 2018-05-06 NOTE — PROGRESS NOTE ADULT - ASSESSMENT
This is an 85-year-old female with a past medical history significant for hypothyroidism hyperlipidemia COPD recently hospitalized in February 2018 admitted on 5/3 for evaluation of weakness and fatigue; patient started to have diarrhea, now found to have cdiff; nursing noted at least 2 loose stools earlier; history per medical record as patient is poor historian. Upon admission she was started on treatment for pneumonia, currently on ceftriaxone and zithromax.  1. Patient admitted with weakness; found to possibly have pneumonia, also with profound leukocytosis and found to have CDiff colitis  - follow up cultures   - iv hydration and supportive care   - serial cbc and monitor temperature   - day #4 antibiotics; will expect to complete 5 day course, especially in setting of cdiff  - contact isolation  - day # 2 po vancomycin 500 mg po q 6 hours  2. other issues: hypothyroidism hyperlipidemia COPD  - per medicine

## 2018-05-07 LAB
ALBUMIN SERPL ELPH-MCNC: 1.3 G/DL — LOW (ref 3.3–5)
ALP SERPL-CCNC: 157 U/L — HIGH (ref 40–120)
ALT FLD-CCNC: 12 U/L — SIGNIFICANT CHANGE UP (ref 12–78)
ANION GAP SERPL CALC-SCNC: 9 MMOL/L — SIGNIFICANT CHANGE UP (ref 5–17)
AST SERPL-CCNC: 16 U/L — SIGNIFICANT CHANGE UP (ref 15–37)
BILIRUB SERPL-MCNC: 0.2 MG/DL — SIGNIFICANT CHANGE UP (ref 0.2–1.2)
BUN SERPL-MCNC: 22 MG/DL — SIGNIFICANT CHANGE UP (ref 7–23)
CALCIUM SERPL-MCNC: 7.5 MG/DL — LOW (ref 8.5–10.1)
CHLORIDE SERPL-SCNC: 111 MMOL/L — HIGH (ref 96–108)
CO2 SERPL-SCNC: 21 MMOL/L — LOW (ref 22–31)
CREAT SERPL-MCNC: 0.34 MG/DL — LOW (ref 0.5–1.3)
GLUCOSE SERPL-MCNC: 86 MG/DL — SIGNIFICANT CHANGE UP (ref 70–99)
HCT VFR BLD CALC: 33.7 % — LOW (ref 34.5–45)
HGB BLD-MCNC: 10.4 G/DL — LOW (ref 11.5–15.5)
MCHC RBC-ENTMCNC: 27.3 PG — SIGNIFICANT CHANGE UP (ref 27–34)
MCHC RBC-ENTMCNC: 30.9 GM/DL — LOW (ref 32–36)
MCV RBC AUTO: 88.5 FL — SIGNIFICANT CHANGE UP (ref 80–100)
NRBC # BLD: 0 /100 WBCS — SIGNIFICANT CHANGE UP (ref 0–0)
PLATELET # BLD AUTO: 262 K/UL — SIGNIFICANT CHANGE UP (ref 150–400)
POTASSIUM SERPL-MCNC: 4.4 MMOL/L — SIGNIFICANT CHANGE UP (ref 3.5–5.3)
POTASSIUM SERPL-SCNC: 4.4 MMOL/L — SIGNIFICANT CHANGE UP (ref 3.5–5.3)
PROT SERPL-MCNC: 4.2 GM/DL — LOW (ref 6–8.3)
RBC # BLD: 3.81 M/UL — SIGNIFICANT CHANGE UP (ref 3.8–5.2)
RBC # FLD: 16.8 % — HIGH (ref 10.3–14.5)
SODIUM SERPL-SCNC: 141 MMOL/L — SIGNIFICANT CHANGE UP (ref 135–145)
WBC # BLD: 19.99 K/UL — HIGH (ref 3.8–10.5)
WBC # FLD AUTO: 19.99 K/UL — HIGH (ref 3.8–10.5)

## 2018-05-07 RX ORDER — OMEPRAZOLE 10 MG/1
0 CAPSULE, DELAYED RELEASE ORAL
Qty: 0 | Refills: 0 | COMMUNITY

## 2018-05-07 RX ORDER — BUDESONIDE, MICRONIZED 100 %
2 POWDER (GRAM) MISCELLANEOUS
Qty: 0 | Refills: 0 | COMMUNITY

## 2018-05-07 RX ORDER — FERROUS SULFATE 325(65) MG
1 TABLET ORAL
Qty: 0 | Refills: 0 | COMMUNITY

## 2018-05-07 RX ORDER — IPRATROPIUM BROMIDE 0.2 MG/ML
2.5 SOLUTION, NON-ORAL INHALATION
Qty: 0 | Refills: 0 | COMMUNITY

## 2018-05-07 RX ADMIN — Medication 500 MILLIGRAM(S): at 17:57

## 2018-05-07 RX ADMIN — Medication 0.25 MILLIGRAM(S): at 08:44

## 2018-05-07 RX ADMIN — CEFTRIAXONE 1000 MILLIGRAM(S): 500 INJECTION, POWDER, FOR SOLUTION INTRAMUSCULAR; INTRAVENOUS at 11:50

## 2018-05-07 RX ADMIN — Medication 500 MILLIGRAM(S): at 06:07

## 2018-05-07 RX ADMIN — Medication 500 MILLIGRAM(S): at 11:50

## 2018-05-07 RX ADMIN — HEPARIN SODIUM 5000 UNIT(S): 5000 INJECTION INTRAVENOUS; SUBCUTANEOUS at 15:03

## 2018-05-07 RX ADMIN — HEPARIN SODIUM 5000 UNIT(S): 5000 INJECTION INTRAVENOUS; SUBCUTANEOUS at 21:31

## 2018-05-07 RX ADMIN — Medication 125 MICROGRAM(S): at 06:07

## 2018-05-07 RX ADMIN — Medication 500 MILLIGRAM(S): at 00:09

## 2018-05-07 RX ADMIN — MIRTAZAPINE 15 MILLIGRAM(S): 45 TABLET, ORALLY DISINTEGRATING ORAL at 21:31

## 2018-05-07 RX ADMIN — ATORVASTATIN CALCIUM 40 MILLIGRAM(S): 80 TABLET, FILM COATED ORAL at 21:32

## 2018-05-07 RX ADMIN — OLANZAPINE 5 MILLIGRAM(S): 15 TABLET, FILM COATED ORAL at 11:50

## 2018-05-07 RX ADMIN — HEPARIN SODIUM 5000 UNIT(S): 5000 INJECTION INTRAVENOUS; SUBCUTANEOUS at 06:07

## 2018-05-07 RX ADMIN — AZITHROMYCIN 250 MILLIGRAM(S): 500 TABLET, FILM COATED ORAL at 11:50

## 2018-05-07 NOTE — PROGRESS NOTE ADULT - ASSESSMENT
This is an 85-year-old female with a past medical history significant for hypothyroidism hyperlipidemia COPD recently hospitalized in February 2018 admitted on 5/3 for evaluation of weakness and fatigue; patient started to have diarrhea, now found to have cdiff; nursing noted at least 2 loose stools earlier; history per medical record as patient is poor historian. Upon admission she was started on treatment for pneumonia, currently on ceftriaxone and zithromax.  1. Patient admitted with weakness; found to possibly have pneumonia, also with profound leukocytosis and found to have CDiff colitis  - follow up cultures   - iv hydration and supportive care   - serial cbc and monitor temperature   - day #5 antibiotics; will expect to complete 5 day course, especially in setting of cdiff; will stop tonite  - contact isolation  - day # 3 po vancomycin 500 mg po q 6 hours  2. other issues: hypothyroidism hyperlipidemia COPD  - per medicine

## 2018-05-07 NOTE — PROGRESS NOTE ADULT - SUBJECTIVE AND OBJECTIVE BOX
Patient is a 85y old  Female who presents with a chief complaint of My mother was weak and tired (03 May 2018 19:31)    Date of service: 05-07-18 @ 17:00  Patient still with loose stools  ROS: unable to obtain secondary to patient medical condition     MEDICATIONS  (STANDING):  atorvastatin 40 milliGRAM(s) Oral at bedtime  azithromycin   Tablet 250 milliGRAM(s) Oral daily  buDESOnide   0.25 milliGRAM(s) Respule 0.25 milliGRAM(s) Inhalation every 12 hours  cefTRIAXone Injectable. 1000 milliGRAM(s) IV Push every 24 hours  heparin  Injectable 5000 Unit(s) SubCutaneous every 8 hours  levothyroxine 125 MICROGram(s) Oral daily  mirtazapine 15 milliGRAM(s) Oral at bedtime  OLANZapine 5 milliGRAM(s) Oral daily  vancomycin    Solution 500 milliGRAM(s) Oral every 6 hours    MEDICATIONS  (PRN):  acetaminophen   Tablet 650 milliGRAM(s) Oral every 6 hours PRN For Temp greater than 38.5 C (101.3 F)  ALBUTerol    90 MICROgram(s) HFA Inhaler 1 Puff(s) Inhalation every 4 hours PRN Shortness of Breath and/or Wheezing      Vital Signs Last 24 Hrs  T(C): 36.3 (07 May 2018 11:33), Max: 36.7 (06 May 2018 21:21)  T(F): 97.4 (07 May 2018 11:33), Max: 98 (06 May 2018 21:21)  HR: 97 (07 May 2018 11:33) (88 - 99)  BP: 106/60 (07 May 2018 11:33) (104/56 - 113/49)  BP(mean): --  RR: 16 (07 May 2018 11:33) (16 - 18)  SpO2: 98% (07 May 2018 11:33) (98% - 100%)    Physical Exam:        PE:    Constitutional: frail looking  HEENT: NC/AT, EOMI, PERRLA, conjunctivae clear; ears and nose atraumatic; pharynx clear  Neck: supple; thyroid not palpable  Respiratory: respiratory effort normal; clear to auscultation  Cardiovascular: S1S2 regular, no murmurs  Abdomen: soft, not tender, not distended, positive BS; no liver or spleen organomegaly  Genitourinary: no suprapubic tenderness  Musculoskeletal: no muscle tenderness, no joint swelling or tenderness  Neurological/ Psychiatric: AxOx2, judgement and insight normal;  moving all extremities  Skin: no rashes; no palpable lesions    Labs: all available labs reviewed             Labs:                        10.4   19.99 )-----------( 262      ( 07 May 2018 07:28 )             33.7     05-07    141  |  111<H>  |  22  ----------------------------<  86  4.4   |  21<L>  |  0.34<L>    Ca    7.5<L>      07 May 2018 07:28    TPro  4.2<L>  /  Alb  1.3<L>  /  TBili  0.2  /  DBili  x   /  AST  16  /  ALT  12  /  AlkPhos  157<H>  05-07           Cultures:       Culture - Blood (collected 05-03-18 @ 16:50)  Source: .Blood None  Preliminary Report (05-04-18 @ 23:01):    No growth to date.    Culture - Blood (collected 05-03-18 @ 15:54)  Source: .Blood Blood  Preliminary Report (05-04-18 @ 23:01):    No growth to date.             < from: Xray Chest 1 View AP/PA. (05.03.18 @ 15:49) >  IMPRESSION:     Right midlung opacity, possibly pneumonia in the appropriate clinical   setting. Follow-up to complete resolution is recommended to exclude   underlying neoplasm.    < end of copied text >        Radiology: all available radiological tests reviewed    Advanced directives addressed: full resuscitation

## 2018-05-07 NOTE — PROGRESS NOTE ADULT - SUBJECTIVE AND OBJECTIVE BOX
This is an 85-year-old female with a past medical history significant for hypothyroidism hyperlipidemia COPD recently hospitalized in February 2018 for subsequently was discharged to rehab facility presents from rehab facility for altered mental status energy and malaise. Adm for poss PNA.    5/4: eating, no c/o' denies cough is on home O2 she tells me; wbc continues to rise; had diarrhea  5/5- poor appetite, two episodes of diarrhea today.  5/6-  two loose BM today   5/7: 2 loose BM today    ICU Vital Signs Last 24 Hrs  T(C): 36.3 (07 May 2018 11:33), Max: 36.7 (06 May 2018 21:21)  T(F): 97.4 (07 May 2018 11:33), Max: 98 (06 May 2018 21:21)  HR: 97 (07 May 2018 11:33) (88 - 99)  BP: 106/60 (07 May 2018 11:33) (104/56 - 113/49)  RR: 16 (07 May 2018 11:33) (16 - 18)  SpO2: 98% (07 May 2018 11:33) (98% - 100%)      GEN: lying in bed, NAD  HEENT:   NC/AT, pupils equal and reactive, EOMI  CV:  +S1, +S2, RRR  RESP:   lungs clear to auscultation bilaterally, crackles at the base  BREAST:  not examined  GI:  abdomen soft, non-tender, non-distended, normoactive BS  RECTAL:  not examined  :  not examined  MSK:   normal muscle tone  EXT:  no edema  NEURO:  AAOX3, no focal neurological deficits  SKIN:  no rashes                                       10.2   32.03 )-----------( 313      ( 05 May 2018 08:12 )             31.8     05-05    137  |  106  |  37<H>  ----------------------------<  84  3.8   |  23  |  0.67    Ca    7.2<L>      05 May 2018 08:12        A/P: pt admitted with possible PNA and now with c.diff    # c.diff - case d/w ID and continue current vanco dose  - continues to have watery diarrhea today  - supportive care  - monitor and replete lytes    # CAP/CHR RESP FAILURE ON HOME O2 FOR COPD  - continue rocephin and zithro day 4 of 5  - per ID d/c with ceftin addl 5 days    dispo - await PT eval   - to call Daughter Monday to discuss. PT eval pending

## 2018-05-08 LAB
ADD ON TEST-SPECIMEN IN LAB: SIGNIFICANT CHANGE UP
ANION GAP SERPL CALC-SCNC: 11 MMOL/L — SIGNIFICANT CHANGE UP (ref 5–17)
BUN SERPL-MCNC: 17 MG/DL — SIGNIFICANT CHANGE UP (ref 7–23)
CALCIUM SERPL-MCNC: 7.6 MG/DL — LOW (ref 8.5–10.1)
CHLORIDE SERPL-SCNC: 108 MMOL/L — SIGNIFICANT CHANGE UP (ref 96–108)
CO2 SERPL-SCNC: 21 MMOL/L — LOW (ref 22–31)
CREAT SERPL-MCNC: 0.24 MG/DL — LOW (ref 0.5–1.3)
CULTURE RESULTS: SIGNIFICANT CHANGE UP
GLUCOSE SERPL-MCNC: 61 MG/DL — LOW (ref 70–99)
HCT VFR BLD CALC: 34.9 % — SIGNIFICANT CHANGE UP (ref 34.5–45)
HGB BLD-MCNC: 11 G/DL — LOW (ref 11.5–15.5)
MAGNESIUM SERPL-MCNC: 1.8 MG/DL — SIGNIFICANT CHANGE UP (ref 1.6–2.6)
MCHC RBC-ENTMCNC: 27.8 PG — SIGNIFICANT CHANGE UP (ref 27–34)
MCHC RBC-ENTMCNC: 31.5 GM/DL — LOW (ref 32–36)
MCV RBC AUTO: 88.1 FL — SIGNIFICANT CHANGE UP (ref 80–100)
NRBC # BLD: 0 /100 WBCS — SIGNIFICANT CHANGE UP (ref 0–0)
PHOSPHATE SERPL-MCNC: 2.5 MG/DL — SIGNIFICANT CHANGE UP (ref 2.5–4.5)
PLATELET # BLD AUTO: 257 K/UL — SIGNIFICANT CHANGE UP (ref 150–400)
POTASSIUM SERPL-MCNC: 4.3 MMOL/L — SIGNIFICANT CHANGE UP (ref 3.5–5.3)
POTASSIUM SERPL-SCNC: 4.3 MMOL/L — SIGNIFICANT CHANGE UP (ref 3.5–5.3)
RBC # BLD: 3.96 M/UL — SIGNIFICANT CHANGE UP (ref 3.8–5.2)
RBC # FLD: 16.8 % — HIGH (ref 10.3–14.5)
SODIUM SERPL-SCNC: 140 MMOL/L — SIGNIFICANT CHANGE UP (ref 135–145)
SPECIMEN SOURCE: SIGNIFICANT CHANGE UP
WBC # BLD: 19.4 K/UL — HIGH (ref 3.8–10.5)
WBC # FLD AUTO: 19.4 K/UL — HIGH (ref 3.8–10.5)

## 2018-05-08 RX ADMIN — Medication 500 MILLIGRAM(S): at 06:08

## 2018-05-08 RX ADMIN — Medication 500 MILLIGRAM(S): at 00:15

## 2018-05-08 RX ADMIN — HEPARIN SODIUM 5000 UNIT(S): 5000 INJECTION INTRAVENOUS; SUBCUTANEOUS at 14:42

## 2018-05-08 RX ADMIN — HEPARIN SODIUM 5000 UNIT(S): 5000 INJECTION INTRAVENOUS; SUBCUTANEOUS at 21:05

## 2018-05-08 RX ADMIN — ATORVASTATIN CALCIUM 40 MILLIGRAM(S): 80 TABLET, FILM COATED ORAL at 21:04

## 2018-05-08 RX ADMIN — Medication 0.25 MILLIGRAM(S): at 20:01

## 2018-05-08 RX ADMIN — MIRTAZAPINE 15 MILLIGRAM(S): 45 TABLET, ORALLY DISINTEGRATING ORAL at 21:05

## 2018-05-08 RX ADMIN — HEPARIN SODIUM 5000 UNIT(S): 5000 INJECTION INTRAVENOUS; SUBCUTANEOUS at 06:08

## 2018-05-08 RX ADMIN — Medication 125 MICROGRAM(S): at 07:25

## 2018-05-08 RX ADMIN — Medication 500 MILLIGRAM(S): at 17:50

## 2018-05-08 RX ADMIN — Medication 500 MILLIGRAM(S): at 11:12

## 2018-05-08 RX ADMIN — OLANZAPINE 5 MILLIGRAM(S): 15 TABLET, FILM COATED ORAL at 11:12

## 2018-05-08 RX ADMIN — Medication 0.25 MILLIGRAM(S): at 07:38

## 2018-05-08 NOTE — PROGRESS NOTE ADULT - SUBJECTIVE AND OBJECTIVE BOX
85-year-old female with a past medical history significant for hypothyroidism hyperlipidemia COPD recently hospitalized in February 2018 for subsequently was discharged to rehab facility presents from rehab facility for altered mental status energy and malaise. Adm for poss PNA- completed IV antibiotics- hospital course complicated with CDAD- started on Vancomycin.     5/8- no overnight events- poor appetite as per report. no overnight events, no new complaints    Vital Signs Last 24 Hrs  T(C): 36.6 (08 May 2018 10:43), Max: 36.7 (08 May 2018 05:31)  T(F): 97.9 (08 May 2018 10:43), Max: 98.1 (08 May 2018 05:31)  HR: 74 (08 May 2018 10:43) (74 - 95)  BP: 111/67 (08 May 2018 10:43) (111/67 - 115/59)  BP(mean): --  RR: 16 (08 May 2018 10:43) (16 - 16)  SpO2: 74% (08 May 2018 10:43) (74% - 100%)    ROS:   All 10 systems reviewed and found to be negative with the exception of what has been described above.    PE:  Constitutional: NAD, laying in bed  HEENT: NC/AT, EOMI, PERRLA  Neck: supple  Back: no tenderness  Respiratory: LCTA  Cardiovascular: S1S2 regular, no murmurs  Abdomen: soft, not tender, not distended, positive BS  Genitourinary: voiding  Rectal: deferred  Musculoskeletal: no muscle tenderness, no joint swelling or tenderness  Extremities: no pedal edema   Neurological: no focal deficits  05-08    140  |  108  |  17  ----------------------------<  61<L>  4.3   |  21<L>  |  0.24<L>                        11.0   19.40 )-----------( 257      ( 08 May 2018 07:33 )             34.9       Ca    7.6<L>      08 May 2018 07:33  Phos  2.5     05-08  Mg     1.8     05-08    TPro  4.2<L>  /  Alb  1.3<L>  /  TBili  0.2  /  DBili  x   /  AST  16  /  ALT  12  /  AlkPhos  157<H>  05-07                                         10.2   32.03 )-----------( 313      ( 05 May 2018 08:12 )             31.8     05-05    137  |  106  |  37<H>  ----------------------------<  84  3.8   |  23  |  0.67    Ca    7.2<L>      05 May 2018 08:12    MEDICATIONS  (STANDING):  atorvastatin 40 milliGRAM(s) Oral at bedtime  buDESOnide   0.25 milliGRAM(s) Respule 0.25 milliGRAM(s) Inhalation every 12 hours  heparin  Injectable 5000 Unit(s) SubCutaneous every 8 hours  levothyroxine 125 MICROGram(s) Oral daily  mirtazapine 15 milliGRAM(s) Oral at bedtime  OLANZapine 5 milliGRAM(s) Oral daily  vancomycin    Solution 500 milliGRAM(s) Oral every 6 hours    MEDICATIONS  (PRN):  acetaminophen   Tablet 650 milliGRAM(s) Oral every 6 hours PRN For Temp greater than 38.5 C (101.3 F)  ALBUTerol    90 MICROgram(s) HFA Inhaler 1 Puff(s) Inhalation every 4 hours PRN Shortness of Breath and/or Wheezing          A/P: pt admitted with possible PNA and now with c.diff    # CDAD - case d/w ID and continue current vanco dose  - continues to have watery diarrhea   - supportive care  - monitor and replete lytes  - poor appetite- encourage po intake  - start IVF.    # CAP/CHR RESP FAILURE ON HOME O2 FOR COPD  - completed IV antibiotic for PNA      Case d/w team on IDR.

## 2018-05-08 NOTE — CHART NOTE - NSCHARTNOTEFT_GEN_A_CORE
Upon Nutritional Assessment by the Registered Dietitian your patient was determined to meet criteria / has evidence of the following diagnosis/diagnoses:          [ ]  Mild Protein Calorie Malnutrition        [ ]  Moderate Protein Calorie Malnutrition        [x ] Severe Protein Calorie Malnutrition        [ ] Unspecified Protein Calorie Malnutrition        [x ] Underweight / BMI <19        [ ] Morbid Obesity / BMI > 40      Findings as based on:  •  Comprehensive nutrition assessment and consultation  •  Calorie counts (nutrient intake analysis)  •  Food acceptance and intake status from observations by staff  •  Follow up  •  Patient education  •  Intervention secondary to interdisciplinary rounds  •   concerns      Treatment:    1) Palliative care consult for GOC.   2) Initiate IVF   3) Ensure enlive 8oz. po TID   4) maintain aspiration precautions.     The following diet has been recommended:  Continue with current diet rx.     PROVIDER Section:     By signing this assessment you are acknowledging and agree with the diagnosis/diagnoses assigned by the Registered Dietitian    Comments:

## 2018-05-08 NOTE — DIETITIAN INITIAL EVALUATION ADULT. - ENERGY NEEDS
Ht.  66    "        Wt. 115   #              BMI 18.6                    #               Pt is at   88 %  IBW

## 2018-05-08 NOTE — DIETITIAN INITIAL EVALUATION ADULT. - NS AS NUTRI DX NUTRIENT
Malnutrition/Pt meets criteria for severe protein/calorie malnutrition in context of acute illness secondary to poor po intake <50% of est. needs x >/= 5 days and severe fat/muscle wasting.

## 2018-05-08 NOTE — DIETITIAN INITIAL EVALUATION ADULT. - OTHER INFO
Nutrition assessment for LOS. Pt is a 84yo female admitted from [facility] with Healthcare-associated PNA. History of Dementia, COPD, Hypothyroid, shortening leg (congenital). Skin L/R heel stage 1 with +1/+2 edema documented. +CDiff noted on contact precautions. Pt currently lethargic and consuming <10% of meals/liquids. Spoke with Dr. Youssef regarding initiating D5 IVF solution secondary to poor intake of fluids/CDIFF (diarrhea), MD agreed. No weight change since last hospital admission 2/22/18. NFPE indicates severe muscle wasting: Temporal, Scapula, Clavicle, Acromion process, calves, patella, and quads. Severe fat loss: Triceps, Ribs, and Ocular. Pt meets criteria for severe protein/calorie malnutrition in context of acute illness secondary to poor po intake <50% of est. needs x >/= 5 days and severe fat/muscle wasting. Recommendations: 1) Palliative care consult for GOC. 2) Initiate IVF 3) Ensure enlive 8oz. po TID 4) maintain aspiration precautions.

## 2018-05-09 ENCOUNTER — TRANSCRIPTION ENCOUNTER (OUTPATIENT)
Age: 83
End: 2018-05-09

## 2018-05-09 VITALS
SYSTOLIC BLOOD PRESSURE: 117 MMHG | DIASTOLIC BLOOD PRESSURE: 68 MMHG | HEART RATE: 104 BPM | RESPIRATION RATE: 16 BRPM | TEMPERATURE: 98 F | OXYGEN SATURATION: 97 %

## 2018-05-09 LAB
ANION GAP SERPL CALC-SCNC: 11 MMOL/L — SIGNIFICANT CHANGE UP (ref 5–17)
BUN SERPL-MCNC: 17 MG/DL — SIGNIFICANT CHANGE UP (ref 7–23)
CALCIUM SERPL-MCNC: 7.6 MG/DL — LOW (ref 8.5–10.1)
CHLORIDE SERPL-SCNC: 107 MMOL/L — SIGNIFICANT CHANGE UP (ref 96–108)
CO2 SERPL-SCNC: 22 MMOL/L — SIGNIFICANT CHANGE UP (ref 22–31)
CREAT SERPL-MCNC: 0.32 MG/DL — LOW (ref 0.5–1.3)
GLUCOSE SERPL-MCNC: 73 MG/DL — SIGNIFICANT CHANGE UP (ref 70–99)
HCT VFR BLD CALC: 35.5 % — SIGNIFICANT CHANGE UP (ref 34.5–45)
HGB BLD-MCNC: 11 G/DL — LOW (ref 11.5–15.5)
MCHC RBC-ENTMCNC: 27.4 PG — SIGNIFICANT CHANGE UP (ref 27–34)
MCHC RBC-ENTMCNC: 31 GM/DL — LOW (ref 32–36)
MCV RBC AUTO: 88.5 FL — SIGNIFICANT CHANGE UP (ref 80–100)
NRBC # BLD: 0 /100 WBCS — SIGNIFICANT CHANGE UP (ref 0–0)
PLATELET # BLD AUTO: 297 K/UL — SIGNIFICANT CHANGE UP (ref 150–400)
POTASSIUM SERPL-MCNC: 4.7 MMOL/L — SIGNIFICANT CHANGE UP (ref 3.5–5.3)
POTASSIUM SERPL-SCNC: 4.7 MMOL/L — SIGNIFICANT CHANGE UP (ref 3.5–5.3)
RBC # BLD: 4.01 M/UL — SIGNIFICANT CHANGE UP (ref 3.8–5.2)
RBC # FLD: 16.9 % — HIGH (ref 10.3–14.5)
SODIUM SERPL-SCNC: 140 MMOL/L — SIGNIFICANT CHANGE UP (ref 135–145)
WBC # BLD: 18.14 K/UL — HIGH (ref 3.8–10.5)
WBC # FLD AUTO: 18.14 K/UL — HIGH (ref 3.8–10.5)

## 2018-05-09 RX ORDER — ENOXAPARIN SODIUM 100 MG/ML
0 INJECTION SUBCUTANEOUS
Qty: 0 | Refills: 0 | COMMUNITY

## 2018-05-09 RX ORDER — MIRTAZAPINE 45 MG/1
1 TABLET, ORALLY DISINTEGRATING ORAL
Qty: 0 | Refills: 0 | COMMUNITY
Start: 2018-05-09

## 2018-05-09 RX ORDER — ATORVASTATIN CALCIUM 80 MG/1
1 TABLET, FILM COATED ORAL
Qty: 0 | Refills: 0 | COMMUNITY
Start: 2018-05-09

## 2018-05-09 RX ORDER — VANCOMYCIN HCL 1 G
5 VIAL (EA) INTRAVENOUS
Qty: 0 | Refills: 0 | COMMUNITY

## 2018-05-09 RX ORDER — OMEPRAZOLE 10 MG/1
1 CAPSULE, DELAYED RELEASE ORAL
Qty: 0 | Refills: 0 | COMMUNITY

## 2018-05-09 RX ORDER — ACETAMINOPHEN 500 MG
2 TABLET ORAL
Qty: 0 | Refills: 0 | COMMUNITY
Start: 2018-05-09

## 2018-05-09 RX ADMIN — Medication 500 MILLIGRAM(S): at 05:37

## 2018-05-09 RX ADMIN — OLANZAPINE 5 MILLIGRAM(S): 15 TABLET, FILM COATED ORAL at 11:46

## 2018-05-09 RX ADMIN — Medication 500 MILLIGRAM(S): at 11:46

## 2018-05-09 RX ADMIN — Medication 125 MICROGRAM(S): at 05:37

## 2018-05-09 RX ADMIN — HEPARIN SODIUM 5000 UNIT(S): 5000 INJECTION INTRAVENOUS; SUBCUTANEOUS at 05:37

## 2018-05-09 RX ADMIN — Medication 0.25 MILLIGRAM(S): at 08:35

## 2018-05-09 RX ADMIN — Medication 500 MILLIGRAM(S): at 02:27

## 2018-05-09 NOTE — DISCHARGE NOTE ADULT - MEDICATION SUMMARY - MEDICATIONS TO STOP TAKING
I will STOP taking the medications listed below when I get home from the hospital:    potassium chloride 20 mEq oral tablet, extended release  -- 2 tab(s) by mouth once a day    predniSONE 10 mg oral tablet  -- 3 tab(s) by mouth once a day 3/31-4/2  2tabs daily 4/3-5, 1tab daily 4/6-8    oxyCODONE 5 mg oral tablet  -- 1 tab(s) by mouth every 6 hours, As Needed for severe pain    cefuroxime 250 mg oral tablet  -- 1 tab(s) by mouth every 12 hours, last day 4/5/18    enoxaparin  -- 40 milligram(s) subcutaneous once a day    bisacodyl 10 mg rectal suppository  -- 1 suppository(ies) rectally once a day, As Needed    Fleet Enema    Lovenox 40 mg/0.4 mL injectable solution  -- injectable once a day    omeprazole    Tamiflu 75 mg oral capsule  -- 1 cap(s) by mouth 2 times a day

## 2018-05-09 NOTE — DISCHARGE NOTE ADULT - PLAN OF CARE
resolved completed antibiotic for Pnuemonia continue bronchodilators  f/u with pulmonologist contienu vancomycin  until 5/19 for diarrhea  do not take laxatives or stool softeners completed antibiotic for Pneumonia continue po vancomycin  until 5/19 for c diff diarrhea  do not take laxatives or stool softeners

## 2018-05-09 NOTE — DISCHARGE NOTE ADULT - HOSPITAL COURSE
85-year-old female with a past medical history significant for hypothyroidism hyperlipidemia COPD recently hospitalized in February 2018 for subsequently was discharged to rehab facility presents from rehab facility for altered mental status energy and malaise. Adm for poss PNA- completed IV antibiotics- hospital course complicated with CDAD- started on Vancomycin.     5/9- confused- denies fever, pain or chills. 1BM as per report and it's not watery.     Vital Signs Last 24 Hrs  T(C): 36.5 (09 May 2018 11:41), Max: 36.5 (08 May 2018 21:21)  T(F): 97.7 (09 May 2018 11:41), Max: 97.7 (08 May 2018 21:21)  HR: 104 (09 May 2018 11:41) (80 - 104)  BP: 117/68 (09 May 2018 11:41) (105/55 - 117/68)  BP(mean): --  RR: 16 (09 May 2018 11:41) (16 - 17)  SpO2: 97% (09 May 2018 11:41) (94% - 99%)    ROS:   All 10 systems reviewed and found to be negative with the exception of what has been described above.    PE:  Constitutional: NAD, laying in bed  HEENT: NC/AT, EOMI, PERRLA  Neck: supple  Back: no tenderness  Respiratory: LCTA  Cardiovascular: S1S2 regular, no murmurs  Abdomen: soft, not tender, not distended, positive BS  Genitourinary: voiding  Rectal: deferred  Musculoskeletal: no muscle tenderness, no joint swelling or tenderness  Extremities: no pedal edema   Neurological: no focal deficits    MEDICATIONS  (STANDING):  atorvastatin 40 milliGRAM(s) Oral at bedtime  buDESOnide   0.25 milliGRAM(s) Respule 0.25 milliGRAM(s) Inhalation every 12 hours  heparin  Injectable 5000 Unit(s) SubCutaneous every 8 hours  levothyroxine 125 MICROGram(s) Oral daily  mirtazapine 15 milliGRAM(s) Oral at bedtime  OLANZapine 5 milliGRAM(s) Oral daily  vancomycin    Solution 500 milliGRAM(s) Oral every 6 hours    MEDICATIONS  (PRN):  acetaminophen   Tablet 650 milliGRAM(s) Oral every 6 hours PRN For Temp greater than 38.5 C (101.3 F)  ALBUTerol    90 MICROgram(s) HFA Inhaler 1 Puff(s) Inhalation every 4 hours PRN Shortness of Breath and/or Wheezing      A/P: pt admitted with possible PNA (resolved) and now with c.diff diarrhea    # CDAD - case d/w ID and continue current vanco dose- total 14 days last dose on 5/19  - diarrhea improved   - supportive care  - poor appetite- encourage po intake      # CAP/CHR RESP FAILURE ON HOME O2 FOR COPD  - completed IV antibiotic for PNA    Case d/w team on IDR. Plan for discahrge back to rehab today if bed available.

## 2018-05-09 NOTE — PROGRESS NOTE ADULT - ASSESSMENT
This is an 85-year-old female with a past medical history significant for hypothyroidism hyperlipidemia COPD recently hospitalized in February 2018 admitted on 5/3 for evaluation of weakness and fatigue; patient started to have diarrhea, now found to have cdiff; nursing noted at least 2 loose stools earlier; history per medical record as patient is poor historian. Upon admission she was started on treatment for pneumonia, currently on ceftriaxone and zithromax.  1. Patient admitted with weakness; found to possibly have pneumonia, also with profound leukocytosis and found to have CDiff colitis  - follow up cultures   - iv hydration and supportive care   - serial cbc and monitor temperature   - contact isolation  - day # 5 po vancomycin 500 mg po q 6 hours  - okay from id standpoint to discharge on vancomycin 500 mg po q 6 hours for 10 more days  2. other issues: hypothyroidism hyperlipidemia COPD  - per medicine

## 2018-05-09 NOTE — DISCHARGE NOTE ADULT - MEDICATION SUMMARY - MEDICATIONS TO TAKE
I will START or STAY ON the medications listed below when I get home from the hospital:    budesonide 0.25 mg/2 mL inhalation suspension  -- 2 milliliter(s) inhaled 2 times a day  -- Indication: For for you rbreathing    aspirin 81 mg oral tablet, chewable  -- 1 tab(s) by mouth once a day  -- Indication: For Home meds    acetaminophen 325 mg oral tablet  -- 2 tab(s) by mouth every 6 hours, As needed, For Temp greater than 38.5 C (101.3 F)  -- Indication: For pain    mirtazapine 15 mg oral tablet  -- 1 tab(s) by mouth once a day (at bedtime)  -- Indication: For antidepressant    atorvastatin 40 mg oral tablet  -- 1 tab(s) by mouth once a day (at bedtime)  -- Indication: For HLD    OLANZapine 5 mg oral tablet  -- 1 tab(s) by mouth once a day  -- Indication: For for your mood    ipratropium 500 mcg/2.5 mL inhalation solution  -- 2.5 milliliter(s) inhaled every 6 hours  -- Indication: For for you rbreathing    vancomycin 500 mg/5 mL oral solution  -- 5 milliliter(s) by mouth every 6 hours  until May 19  -- Indication: For Diaarhea    ferrous sulfate 325 mg (65 mg elemental iron) oral tablet  -- 1 tab(s) by mouth 2 times a day (with meals)  -- Indication: For supplement    bisacodyl 10 mg rectal suppository  -- 1 suppository(ies) rectally once a day, As Needed  -- Indication: For Do not take this if you are having diarrhea    Fleet Enema  -- Indication: For Do not take this if you are having diarrhea    levothyroxine 125 mcg (0.125 mg) oral tablet  -- 1 tab(s) by mouth once a day  -- Indication: For Thyroid medicine    Multiple Vitamins oral tablet  -- 1 tab(s) by mouth once a day  -- Indication: For supplement

## 2018-05-09 NOTE — DISCHARGE NOTE ADULT - PROVIDER TOKENS
FREE:[LAST:[PCP in the community],PHONE:[(   )    -],FAX:[(   )    -],ADDRESS:[in 1-2 weeks]],TOKEN:'[id number]'

## 2018-05-09 NOTE — DISCHARGE NOTE ADULT - CARE PLAN
Principal Discharge DX:	HCAP (healthcare-associated pneumonia)  Goal:	resolved  Assessment and plan of treatment:	completed antibiotic for Pnuemonia  Secondary Diagnosis:	Chronic obstructive pulmonary disease, unspecified COPD type  Assessment and plan of treatment:	continue bronchodilators  f/u with pulmonologist  Secondary Diagnosis:	Clostridium difficile diarrhea  Assessment and plan of treatment:	contienu vancomycin  until 5/19 for diarrhea  do not take laxatives or stool softeners Principal Discharge DX:	HCAP (healthcare-associated pneumonia)  Goal:	resolved  Assessment and plan of treatment:	completed antibiotic for Pneumonia  Secondary Diagnosis:	Chronic obstructive pulmonary disease, unspecified COPD type  Assessment and plan of treatment:	continue bronchodilators  f/u with pulmonologist  Secondary Diagnosis:	Clostridium difficile diarrhea  Assessment and plan of treatment:	continue po vancomycin  until 5/19 for c diff diarrhea  do not take laxatives or stool softeners

## 2018-05-09 NOTE — DISCHARGE NOTE ADULT - PATIENT PORTAL LINK FT
You can access the [facility] Patient Portal, offered by [facility], by registering with the following website: [email or web address]

## 2018-05-09 NOTE — PROGRESS NOTE ADULT - SUBJECTIVE AND OBJECTIVE BOX
Patient is a 85y old  Female who presents with a chief complaint of My mother was weak and tired (03 May 2018 19:31)    Date of service: 05-09-18 @ 10:29  Patient is fatigued,overall improved  ROS: unable to obtain secondary to patient medical condition     MEDICATIONS  (STANDING):  atorvastatin 40 milliGRAM(s) Oral at bedtime  buDESOnide   0.25 milliGRAM(s) Respule 0.25 milliGRAM(s) Inhalation every 12 hours  heparin  Injectable 5000 Unit(s) SubCutaneous every 8 hours  levothyroxine 125 MICROGram(s) Oral daily  mirtazapine 15 milliGRAM(s) Oral at bedtime  OLANZapine 5 milliGRAM(s) Oral daily  vancomycin    Solution 500 milliGRAM(s) Oral every 6 hours    MEDICATIONS  (PRN):  acetaminophen   Tablet 650 milliGRAM(s) Oral every 6 hours PRN For Temp greater than 38.5 C (101.3 F)  ALBUTerol    90 MICROgram(s) HFA Inhaler 1 Puff(s) Inhalation every 4 hours PRN Shortness of Breath and/or Wheezing      Vital Signs Last 24 Hrs  T(C): 36.2 (09 May 2018 05:46), Max: 36.6 (08 May 2018 10:43)  T(F): 97.1 (09 May 2018 05:46), Max: 97.9 (08 May 2018 10:43)  HR: 80 (09 May 2018 08:35) (74 - 101)  BP: 105/55 (09 May 2018 05:46) (105/55 - 116/53)  BP(mean): --  RR: 17 (09 May 2018 05:46) (16 - 17)  SpO2: 99% (09 May 2018 05:46) (74% - 99%)    Physical Exam:        PE:    Constitutional: frail looking  HEENT: NC/AT, EOMI, PERRLA, conjunctivae clear; ears and nose atraumatic; pharynx clear  Neck: supple; thyroid not palpable  Respiratory: respiratory effort normal; clear to auscultation  Cardiovascular: S1S2 regular, no murmurs  Abdomen: soft, not tender, not distended, positive BS; no liver or spleen organomegaly  Genitourinary: no suprapubic tenderness  Musculoskeletal: no muscle tenderness, no joint swelling or tenderness  Neurological/ Psychiatric: AxOx2, judgement and insight normal;  moving all extremities  Skin: no rashes; no palpable lesions    Labs: all available labs reviewed             Labs:                         Labs:                        11.0   18.14 )-----------( 297      ( 09 May 2018 05:31 )             35.5     05-09    140  |  107  |  17  ----------------------------<  73  4.7   |  22  |  0.32<L>    Ca    7.6<L>      09 May 2018 05:31  Phos  2.5     05-08  Mg     1.8     05-08             Cultures:       Culture - Stool (collected 05-06-18 @ 20:35)  Source: .Stool Feces  Final Report (05-08-18 @ 16:42):    No enteric pathogens isolated.    (Stool culture examined for Salmonella,    Shigella, Campylobacter, Aeromonas, Plesiomonas,    Vibrio, E.coli O157 and Yersinia)    No enteric gram negative rods isolated    Moderate Yeast like cells    Culture - Blood (collected 05-03-18 @ 16:50)  Source: .Blood None  Final Report (05-08-18 @ 23:00):    No growth at 5 days.    Culture - Blood (collected 05-03-18 @ 15:54)  Source: .Blood Blood  Final Report (05-08-18 @ 23:00):    No growth at 5 days.               < from: Xray Chest 1 View AP/PA. (05.03.18 @ 15:49) >  IMPRESSION:     Right midlung opacity, possibly pneumonia in the appropriate clinical   setting. Follow-up to complete resolution is recommended to exclude   underlying neoplasm.    < end of copied text >        Radiology: all available radiological tests reviewed    Advanced directives addressed: full resuscitation

## 2018-05-09 NOTE — DISCHARGE NOTE ADULT - CARE PROVIDER_API CALL
PCP in the community,   in 1-2 weeks  Phone: (   )    -  Fax: (   )    -    Paige Monahan (), Infectious Disease; Internal Medicine  [address] [place] [address]  Phone: [phone]  Fax: [phone]

## 2018-05-11 DIAGNOSIS — A04.72 ENTEROCOLITIS DUE TO CLOSTRIDIUM DIFFICILE, NOT SPECIFIED AS RECURRENT: ICD-10-CM

## 2018-05-11 DIAGNOSIS — M19.022 PRIMARY OSTEOARTHRITIS, LEFT ELBOW: ICD-10-CM

## 2018-05-11 DIAGNOSIS — E43 UNSPECIFIED SEVERE PROTEIN-CALORIE MALNUTRITION: ICD-10-CM

## 2018-05-11 DIAGNOSIS — J18.9 PNEUMONIA, UNSPECIFIED ORGANISM: ICD-10-CM

## 2018-05-11 DIAGNOSIS — E86.0 DEHYDRATION: ICD-10-CM

## 2018-05-11 DIAGNOSIS — E03.9 HYPOTHYROIDISM, UNSPECIFIED: ICD-10-CM

## 2018-05-11 DIAGNOSIS — N17.9 ACUTE KIDNEY FAILURE, UNSPECIFIED: ICD-10-CM

## 2018-05-11 DIAGNOSIS — D72.829 ELEVATED WHITE BLOOD CELL COUNT, UNSPECIFIED: ICD-10-CM

## 2018-05-11 DIAGNOSIS — J44.9 CHRONIC OBSTRUCTIVE PULMONARY DISEASE, UNSPECIFIED: ICD-10-CM

## 2018-05-11 DIAGNOSIS — M19.90 UNSPECIFIED OSTEOARTHRITIS, UNSPECIFIED SITE: ICD-10-CM

## 2018-05-11 DIAGNOSIS — J96.10 CHRONIC RESPIRATORY FAILURE, UNSPECIFIED WHETHER WITH HYPOXIA OR HYPERCAPNIA: ICD-10-CM

## 2018-05-11 DIAGNOSIS — M19.021 PRIMARY OSTEOARTHRITIS, RIGHT ELBOW: ICD-10-CM

## 2018-05-11 DIAGNOSIS — D64.9 ANEMIA, UNSPECIFIED: ICD-10-CM

## 2018-05-11 DIAGNOSIS — E78.5 HYPERLIPIDEMIA, UNSPECIFIED: ICD-10-CM

## 2018-05-11 DIAGNOSIS — F03.90 UNSPECIFIED DEMENTIA WITHOUT BEHAVIORAL DISTURBANCE: ICD-10-CM

## 2018-06-01 NOTE — ED ADULT NURSE NOTE - CHPI ED SYMPTOMS POS
"              After Visit Summary   2018    Dustin Mccoy    MRN: [id number]           Patient Information     Date Of Birth          1973        Visit Information        Provider Department      2018 10:30 AM Janeen Harry LMFT [facility]        Today's Diagnoses     Severe single current episode of major depressive disorder, without psychotic features (H)    -  1    Adjustment disorder with anxious mood           Follow-ups after your visit        Who to contact     If you have questions or need follow up information about today's clinic visit or your schedule please contact [facility] directly at [phone].  Normal or non-critical lab and imaging results will be communicated to you by Solexahart, letter or phone within 4 business days after the clinic has received the results. If you do not hear from us within 7 days, please contact the clinic through Solexahart or phone. If you have a critical or abnormal lab result, we will notify you by phone as soon as possible.  Submit refill requests through [email or web address] or call your pharmacy and they will forward the refill request to us. Please allow 3 business days for your refill to be completed.          Additional Information About Your Visit        MyChart Information     [email or web address] lets you send messages to your doctor, view your test results, renew your prescriptions, schedule appointments and more. To sign up, go to [email or web address] . Click on \"Log in\" on the left side of the screen, which will take you to the Welcome page. Then click on \"Sign up Now\" on the right side of the page.     You will be asked to enter the access code listed below, as well as some personal information. Please follow the directions to create your username and password.     Your access code is: [email or web address]  Expires: 2018  8:34 AM     Your access code will  in 90 days. If you need help or a new code, please call your [facility] " clinic or [phone].        Care EveryWhere ID     This is your Care EveryWhere ID. This could be used by other organizations to access your [facility] medical records  [id number]         Blood Pressure from Last 3 Encounters:   05/31/18 124/80   04/26/18 128/80   11/16/17 118/74    Weight from Last 3 Encounters:   05/31/18 100.6 kg (221 lb 11.2 oz)   04/26/18 102.5 kg (225 lb 14.4 oz)   11/29/17 108 kg (238 lb)              Today, you had the following     No orders found for display       Primary Care Provider Fax #    Physician No Ref-Primary [phone]       No address on file        Equal Access to Services     LOKESH HUSAIN : Eugene Cole, drake parker, qacarley kaalmamelina hopkins, jose antonio roberson . So [facility] [phone].    ATENCIÓN: Si habla español, tiene a larry disposición servicios gratuitos de asistencia lingüística. Llame al [phone].    We comply with applicable federal civil rights laws and [place] laws. We do not discriminate on the basis of race, color, national origin, age, disability, sex, sexual orientation, or gender identity.            Thank you!     Thank you for choosing [facility]  for your care. Our goal is always to provide you with excellent care. Hearing back from our patients is one way we can continue to improve our services. Please take a few minutes to complete the written survey that you may receive in the mail after your visit with us. Thank you!             Your Updated Medication List - Protect others around you: Learn how to safely use, store and throw away your medicines at www.disposemymeds.org.          This list is accurate as of 6/1/18 11:08 AM.  Always use your most recent med list.                   Brand Name Dispense Instructions for use Diagnosis    acetaminophen 325 MG tablet    TYLENOL    100 tablet    Take 2 tablets (650 mg) by mouth every 4 hours as needed for mild pain    Olecranon bursitis of left  elbow       colestipol 1 g tablet    COLESTID     Take 1 mg by mouth 1-2 daily        IBUPROFEN PO      As needed        * naproxen 500 MG tablet    NAPROSYN    30 tablet    Take 1 tablet (500 mg) by mouth 2 times daily as needed for moderate pain    Hand injury, right, initial encounter       * naproxen 500 MG tablet    NAPROSYN    30 tablet    Take 1 tablet (500 mg) by mouth 2 times daily as needed for moderate pain    Injury of right hand, subsequent encounter       zolpidem 12.5 MG CR tablet    AMBIEN CR    30 tablet    Take 1 tablet (12.5 mg) by mouth nightly as needed for sleep    Insomnia, unspecified type       * Notice:  This list has 2 medication(s) that are the same as other medications prescribed for you. Read the directions carefully, and ask your doctor or other care provider to review them with you.       PAIN/DISORIENTATION/CONFUSION

## 2018-12-03 NOTE — H&P ADULT - NSCORESITESY/N_GEN_A_CORE_RD

## 2019-04-29 NOTE — DISCHARGE NOTE ADULT - FUNCTIONAL STATUS DATE
EXAM note    Vital Signs:    Vitals:    04/29/19 1346   BP: 142/66   Pulse: 60   Temp: 97.3 °F (36.3 °C)   TempSrc: Tympanic   SpO2: 94%   Weight: 78.9 kg       History  Alicia Briones is a 87 year old female who presents to the office today with bilateral calf pain which started last week. Just started and needed help to get up. Right worse than the left.  Pain is worse with walking and standing. Alicia has a history of acute left leg peripheral artery occlusion related to cardiac emboli secondary to age fibrillation in the past. Mild to moderate pain which is worse with walking and standing. Denies claudication.  She is compliant with 5 mg twice daily. She is doing well other than having the pain in the right leg more than the left. No known injury. Denies shortness of breath.     medical history  Past Medical History:   Diagnosis Date   • A-fib (CMS/HCC)     [id number] score is 6-7   • Aortic valve regurgitation    • Arterial ischemic stroke, vertebrobasilar, brainstem, remote, resolved    • Benign hypertension with CKD (chronic kidney disease) stage III (CMS/[facility])    • Carotid artery stenosis 1-49% right and 1-69% left. 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     • Meningioma (CMS/[facility])    • Obesity    • Oropharyngeal dysphagia 5/3/2017   • Seizure (CMS/[facility]) 5/3/2017   • Syncope    • TIA (transient ischemic attack)    • Urinary tract infection        SURGICAL history  Past Surgical History:   Procedure Laterality Date   • Carpal tunnel release  2013    bilateral    • Ct angiogram lower extremity  9/14/2016    Acute left lower  limb ischemia secondary to fresh thrombus s/p infusion directed catheter TPA infusion with patency of distal SFA, popliteal and three vessel runoff.     • Dexa bone density axial skeleton  10/07/2010   • Echo tarun  9/2016    Very dense spontaneous echocontrast in left atrial appendage - no definite thrombus visualized in JEFRY apex but extreme stasis of flow. Very decreased flow velocities in left atrial appendage. Mild calcific mitral stenosis, mean gradient 2.1 mmHg at heart rate 88 bpm.   • Hysterectomy     • Mammo screening bilateral  12/30/2010       social history  Social History     Tobacco Use   • Smoking status: Former Smoker     Packs/day: 0.25     Years: 10.00     Pack years: 2.50     Types: Cigarettes     Last attempt to quit: 2/19/2016     Years since quitting: 3.1   • Smokeless tobacco: Never Used   Substance Use Topics   • Alcohol use: Yes     Alcohol/week: 0.0 oz     Comment: holidays   • Drug use: No       family history    Family History   Problem Relation Age of Onset   • Myocardial Infarction Father    • Cerebrovascular Accident Father    • Heart disease Daughter    • Heart disease Daughter        mEDICATIONS  Current Outpatient Medications   Medication Sig   • Triamcinolone Acetonide (TRIAMCINOLONE 0.1% IN EUCERIN) Apply 1 application topically 2 times daily. 50:50 mixture.   • hydrOXYzine (ATARAX) 10 MG tablet Take 1 tablet by mouth 3 times daily as needed for Itching.   • triamcinolone (ARISTOCORT) 0.1 % cream Apply topically 2 times daily as needed (Rash).   • levothyroxine (SYNTHROID, LEVOTHROID) 50 MCG tablet Take 1 tablet by mouth daily.   • atorvastatin (LIPITOR) 80 MG tablet Take 1 tablet by mouth daily.   • memantine-donepezil 28-10 MG capsule Take 1 capsule by mouth nightly.   • dilTIAZem (TIAZAC) 240 MG 24 hr capsule Take 1 capsule by mouth daily.   • digoxin (LANOXIN) 0.125 MG tablet Take 1 tablet by mouth daily.   • furosemide (LASIX) 20 MG tablet Take 1 tablet by mouth 2 times daily.    • metoPROLOL succinate (TOPROL-XL) 100 MG 24 hr tablet Take 1 tablet by mouth daily.   • apixaban (ELIQUIS) 5 MG Tab Take 1 tablet by mouth every 12 hours.   • albuterol 108 (90 Base) MCG/ACT inhaler Inhale 2 puffs into the lungs every 4 hours as needed for Shortness of Breath or Wheezing.   • Spacer/Aero-Holding Chambers (AEROCHAMBER) Use with Albuterol inhaler.   • acetaminophen (TYLENOL) 325 MG tablet Take 650 mg by mouth every 4 hours as needed for Pain.   • aspirin 81 MG chewable tablet Chew 1 tablet by mouth daily.   • lisinopril (ZESTRIL) 20 MG tablet Take 1 tablet by mouth daily.   • loratadine (CLARITIN) 10 MG tablet Take 1 tablet by mouth daily (with dinner).   • pantoprazole (PROTONIX) 40 MG tablet Take 1 tablet by mouth daily (before breakfast).     No current facility-administered medications for this visit.        aLLERGIES  ALLERGIES:  No Known Allergies    Review of systems   general health: As noted above.  Musculoskeletal: As in history of present illness. She denies upper extremity pain. Denies pain involving hip girdles.  Abdomen: Denies abdominal pain.  Circulation: Remarkable for atrial fibrillation and compliant with Eliquis.    Physical Exam   general examination alert, pleasant and in no distress. Rises slowly from a sitting position.  Neck: Supple without rigidity adenopathy and masses.  Lungs:  Clear to auscultation and percussion without wheezing, rales and rhonchi. There is no use of accessory muscles of respiration.  Cardiac: Reveals an irregularly irregular rhythm with a grade 2/6 soft ejection murmur.  Abdomen: Positive bowel sounds, soft, nontender not distended.  Right hip: External rotation 45° and internal rotation 5° without discomfort.  Left hip: External rotation 60° and internal rotation 5° without discomfort.  Circulation: Femoral pulses intact bilaterally without femoral-brachial delay and without femoral bruit. Right popliteal pulse is present. Difficult to palpate left  popliteal pulse. Dorsalis pedis pulses present bilaterally. Right posterior tibialis pulses present. I do not palpate left posterior tibialis pulse. Capillary refill 3 seconds and the toes bilaterally. Feet are warm to touch. No open areas, ulcerations, tinea pedis Preulcerative calluses.  Lower extremities: Right calf with tenderness. Left calf nontender. Homans sign negative bilaterally. Tibia tender bilaterally. No overlying skin changes.    US LOWER EXTREMITY VENOUS DUPLEX RIGHT     HISTORY: Lower extremity edema and pain.     TECHNIQUE: Gray-scale with and without compressive augmentation, color  Doppler, and spectral duplex images are obtained of the right lower  extremity deep venous system.     COMPARISON: None available.     FINDINGS:     Right common femoral, deep femoral, femoral, and popliteal veins  demonstrate normal compressibility, augmentation, and Doppler flow without  intraluminal thrombus. Right posterior tibial vein demonstrates normal  color filling without evidence of thrombus.     IMPRESSION:     Negative for DVT right lower extremity.     Assessment AND Plan  ASSESSMENT:  1. Right calf pain        PLAN:  Orders Placed This Encounter   • US Lower Extremity Venous Duplex Right     Normal venous Doppler study of the right lower extremity discussed. Work on stretching of the hamstrings and calves bilaterally as discussed and demonstrated during today's office visit. Tylenol 1000 mg 3 times daily as needed for pain. If not improving or condition worsens, to let us know.    No follow-ups on file.         09-May-2018

## 2022-02-27 NOTE — PHYSICAL THERAPY INITIAL EVALUATION ADULT - ACTIVE RANGE OF MOTION EXAMINATION, REHAB EVAL
[No Acute Distress] : no acute distress [Well Nourished] : well nourished [Well Developed] : well developed [Well-Appearing] : well-appearing [Normal Sclera/Conjunctiva] : normal sclera/conjunctiva [PERRL] : pupils equal round and reactive to light [Normal Oropharynx] : the oropharynx was normal no Active ROM deficits were identified

## 2022-05-11 NOTE — DIETITIAN INITIAL EVALUATION ADULT. - NUTRITION INTERVENTION
Collaboration and Referral of Nutrition Care/Medical Food Supplements/Parenteral Nutrition/IV Fluids/Feeding Assistance
information could not be obtained

## 2022-07-26 NOTE — H&P ADULT - PROBLEM SELECTOR PLAN 5
FUTURE VISIT INFORMATION      FUTURE VISIT INFORMATION:    Date: 8/24/2022    Time: 8am    Location: [facility]  REFERRAL INFORMATION:    Referring provider:  Dr. Glo Venegas     Referring providers clinic:  [facility]     Reason for visit/diagnosis  Headaches     RECORDS REQUESTED FROM:       Clinic name Comments Records Status Imaging Status   INternal Dr. Ramirez-5/24/2022 [facility] No Images          Pernell Lynch-3/3/2020 [facility] No Images                             w/ diffuse wheezing, sob conversing  possibly 2/2 COPD in former smoker, cardiac etiology, kyphosis- no prior work up  will check CXR, TTE- further per findings  will treat w/ duonebs, IV steroids, oxygen  pulm cs  monitor pulse ox, titrate O2 to keep sats>90%

## 2022-12-12 NOTE — PATIENT PROFILE ADULT. - NS TRANSFER EYEGLASSES PAIRS
1 pair

## 2023-03-03 NOTE — PATIENT PROFILE ADULT. - HEALTHCARE INFORMATION NEEDED, PROFILE
Danny Matthews is a 56 year old male presenting with physical      Refills needed: no        Medications reviewed and updated.      Social History     Tobacco Use   Smoking Status Never   Smokeless Tobacco Never         Health Maintenance Due   Topic Date Due   • Hepatitis B Vaccine (1 of 3 - 3-dose series) Never done   • Pneumococcal Vaccine 0-64 (1 - PCV) Never done   • Shingles Vaccine (1 of 2) Never done   • COVID-19 Vaccine (4 - Booster for Pfizer series) 03/29/2022   • Depression Screening  03/03/2023       Patient is due for the topics as listed above   none

## 2024-03-12 NOTE — DISCHARGE NOTE ADULT - CARE PROVIDER_API CALL
[FreeTextEntry1] : I, Rodrigue Collins, 
Ben Mireles (DO), Orthopaedic Surgery  [address] [place] [address]  Phone: [phone]  Fax: [phone]

## 2024-08-14 NOTE — PHYSICAL THERAPY INITIAL EVALUATION ADULT - PERSONAL SAFETY AND JUDGMENT, REHAB EVAL
Render In Strict Bullet Format?: No Initiate Treatment: 5FU BID x 2 weeks to forehead and L shin this fall. Detail Level: Zone impaired

## 2024-10-24 NOTE — PATIENT PROFILE ADULT. - PATIENT REPRESENTATIVE: ( YOU CAN CHOOSE ANY PERSON THAT CAN ASSIST YOU WITH YOUR HEALTH CARE PREFERENCES, DOES NOT HAVE TO BE A SPOUSE, IMMEDIATE FAMILY OR SIGNIFICANT OTHER/PARTNER)
normal, in no acute distress,  well developed, well nourished,  ambulating without difficulty,  normal communication ability , alert Yes

## 2025-06-12 NOTE — CONSULT NOTE ADULT - CONSULT REQUESTED DATE/TIME
21-Feb-2018 14:48
22-Feb-2018 11:59
22-Feb-2018 13:52
22-Feb-2018 17:51
Patient/Caregiver provided printed discharge information.